# Patient Record
Sex: FEMALE | Race: WHITE | NOT HISPANIC OR LATINO
[De-identification: names, ages, dates, MRNs, and addresses within clinical notes are randomized per-mention and may not be internally consistent; named-entity substitution may affect disease eponyms.]

---

## 2017-01-13 ENCOUNTER — OTHER (OUTPATIENT)
Age: 82
End: 2017-01-13

## 2017-01-23 LAB
ALBUMIN SERPL ELPH-MCNC: 4.1 G/DL
ALP BLD-CCNC: 65 U/L
ALT SERPL-CCNC: 18 U/L
ANION GAP SERPL CALC-SCNC: 16 MMOL/L
APPEARANCE: ABNORMAL
AST SERPL-CCNC: 15 U/L
BACTERIA: ABNORMAL
BASOPHILS # BLD AUTO: 0.04 K/UL
BASOPHILS NFR BLD AUTO: 0.5 %
BILIRUB SERPL-MCNC: 0.4 MG/DL
BILIRUBIN URINE: NEGATIVE
BLOOD URINE: NEGATIVE
BUN SERPL-MCNC: 22 MG/DL
CALCIUM SERPL-MCNC: 9.5 MG/DL
CHLORIDE SERPL-SCNC: 103 MMOL/L
CHOLEST SERPL-MCNC: 154 MG/DL
CHOLEST/HDLC SERPL: 2.5 RATIO
CO2 SERPL-SCNC: 24 MMOL/L
COLOR: YELLOW
CREAT SERPL-MCNC: 0.74 MG/DL
EOSINOPHIL # BLD AUTO: 0.16 K/UL
EOSINOPHIL NFR BLD AUTO: 1.9 %
GLUCOSE QUALITATIVE U: NORMAL MG/DL
GLUCOSE SERPL-MCNC: 95 MG/DL
HCT VFR BLD CALC: 40.4 %
HDLC SERPL-MCNC: 62 MG/DL
HGB BLD-MCNC: 13 G/DL
HYALINE CASTS: 2 /LPF
IMM GRANULOCYTES NFR BLD AUTO: 0.4 %
KETONES URINE: NEGATIVE
LDLC SERPL CALC-MCNC: 68 MG/DL
LEUKOCYTE ESTERASE URINE: ABNORMAL
LYMPHOCYTES # BLD AUTO: 2.68 K/UL
LYMPHOCYTES NFR BLD AUTO: 32.5 %
MAN DIFF?: NORMAL
MCHC RBC-ENTMCNC: 31.2 PG
MCHC RBC-ENTMCNC: 32.2 GM/DL
MCV RBC AUTO: 96.9 FL
MICROSCOPIC-UA: NORMAL
MONOCYTES # BLD AUTO: 0.52 K/UL
MONOCYTES NFR BLD AUTO: 6.3 %
NEUTROPHILS # BLD AUTO: 4.82 K/UL
NEUTROPHILS NFR BLD AUTO: 58.4 %
NITRITE URINE: POSITIVE
PH URINE: 6.5
PLATELET # BLD AUTO: 314 K/UL
POTASSIUM SERPL-SCNC: 4.6 MMOL/L
PROT SERPL-MCNC: 6.2 G/DL
PROTEIN URINE: NEGATIVE MG/DL
RBC # BLD: 4.17 M/UL
RBC # FLD: 14.9 %
RED BLOOD CELLS URINE: 3 /HPF
SODIUM SERPL-SCNC: 143 MMOL/L
SPECIFIC GRAVITY URINE: 1.02
SQUAMOUS EPITHELIAL CELLS: 11 /HPF
TRIGL SERPL-MCNC: 119 MG/DL
TSH SERPL-ACNC: 4.48 UIU/ML
UROBILINOGEN URINE: NORMAL MG/DL
WBC # FLD AUTO: 8.25 K/UL
WHITE BLOOD CELLS URINE: 16 /HPF

## 2017-01-24 ENCOUNTER — APPOINTMENT (OUTPATIENT)
Dept: INTERNAL MEDICINE | Facility: CLINIC | Age: 82
End: 2017-01-24

## 2017-01-24 ENCOUNTER — NON-APPOINTMENT (OUTPATIENT)
Age: 82
End: 2017-01-24

## 2017-01-24 VITALS
WEIGHT: 160 LBS | SYSTOLIC BLOOD PRESSURE: 132 MMHG | RESPIRATION RATE: 16 BRPM | DIASTOLIC BLOOD PRESSURE: 84 MMHG | OXYGEN SATURATION: 98 % | HEART RATE: 79 BPM | BODY MASS INDEX: 29.26 KG/M2

## 2017-01-24 DIAGNOSIS — F41.9 ANXIETY DISORDER, UNSPECIFIED: ICD-10-CM

## 2017-01-24 DIAGNOSIS — Z79.2 LONG TERM (CURRENT) USE OF ANTIBIOTICS: ICD-10-CM

## 2017-01-24 DIAGNOSIS — R55 SYNCOPE AND COLLAPSE: ICD-10-CM

## 2017-01-27 ENCOUNTER — MEDICATION RENEWAL (OUTPATIENT)
Age: 82
End: 2017-01-27

## 2017-01-30 LAB
APPEARANCE: CLEAR
BACTERIA: ABNORMAL
BILIRUBIN URINE: NEGATIVE
BLOOD URINE: ABNORMAL
COLOR: YELLOW
GLUCOSE QUALITATIVE U: NORMAL MG/DL
HYALINE CASTS: 0 /LPF
KETONES URINE: NEGATIVE
LEUKOCYTE ESTERASE URINE: ABNORMAL
MICROSCOPIC-UA: NORMAL
NITRITE URINE: POSITIVE
PH URINE: 6.5
PROTEIN URINE: NEGATIVE MG/DL
RED BLOOD CELLS URINE: 1 /HPF
SPECIFIC GRAVITY URINE: 1.02
SQUAMOUS EPITHELIAL CELLS: 1 /HPF
UROBILINOGEN URINE: NORMAL MG/DL
WHITE BLOOD CELLS URINE: 11 /HPF

## 2017-02-06 ENCOUNTER — OUTPATIENT (OUTPATIENT)
Dept: OUTPATIENT SERVICES | Facility: HOSPITAL | Age: 82
LOS: 1 days | End: 2017-02-06
Payer: MEDICARE

## 2017-02-06 ENCOUNTER — APPOINTMENT (OUTPATIENT)
Dept: RADIOLOGY | Facility: CLINIC | Age: 82
End: 2017-02-06

## 2017-02-06 DIAGNOSIS — C34.90 MALIGNANT NEOPLASM OF UNSPECIFIED PART OF UNSPECIFIED BRONCHUS OR LUNG: ICD-10-CM

## 2017-02-06 DIAGNOSIS — H26.9 UNSPECIFIED CATARACT: Chronic | ICD-10-CM

## 2017-02-06 DIAGNOSIS — Z98.89 OTHER SPECIFIED POSTPROCEDURAL STATES: Chronic | ICD-10-CM

## 2017-02-06 PROCEDURE — 71046 X-RAY EXAM CHEST 2 VIEWS: CPT

## 2017-02-14 ENCOUNTER — APPOINTMENT (OUTPATIENT)
Dept: THORACIC SURGERY | Facility: CLINIC | Age: 82
End: 2017-02-14

## 2017-02-14 VITALS
RESPIRATION RATE: 16 BRPM | DIASTOLIC BLOOD PRESSURE: 80 MMHG | HEART RATE: 73 BPM | OXYGEN SATURATION: 98 % | WEIGHT: 160 LBS | HEIGHT: 62 IN | BODY MASS INDEX: 29.44 KG/M2 | SYSTOLIC BLOOD PRESSURE: 135 MMHG

## 2017-02-14 RX ORDER — AMOXICILLIN 500 MG/1
500 CAPSULE ORAL TWICE DAILY
Qty: 24 | Refills: 0 | Status: COMPLETED | COMMUNITY
Start: 2017-01-24 | End: 2017-02-14

## 2017-02-14 RX ORDER — AMPICILLIN 500 MG/1
500 CAPSULE ORAL
Qty: 30 | Refills: 5 | Status: COMPLETED | COMMUNITY
Start: 2017-01-27 | End: 2017-02-14

## 2017-05-11 ENCOUNTER — OUTPATIENT (OUTPATIENT)
Dept: OUTPATIENT SERVICES | Facility: HOSPITAL | Age: 82
LOS: 1 days | End: 2017-05-11
Payer: MEDICARE

## 2017-05-11 DIAGNOSIS — C34.01 MALIGNANT NEOPLASM OF RIGHT MAIN BRONCHUS: ICD-10-CM

## 2017-05-11 DIAGNOSIS — Z98.89 OTHER SPECIFIED POSTPROCEDURAL STATES: Chronic | ICD-10-CM

## 2017-05-11 DIAGNOSIS — H26.9 UNSPECIFIED CATARACT: Chronic | ICD-10-CM

## 2017-05-11 PROCEDURE — 71250 CT THORAX DX C-: CPT | Mod: 26

## 2017-05-11 PROCEDURE — 71250 CT THORAX DX C-: CPT

## 2017-05-16 ENCOUNTER — APPOINTMENT (OUTPATIENT)
Dept: THORACIC SURGERY | Facility: CLINIC | Age: 82
End: 2017-05-16

## 2017-05-16 VITALS
RESPIRATION RATE: 16 BRPM | SYSTOLIC BLOOD PRESSURE: 134 MMHG | BODY MASS INDEX: 29.08 KG/M2 | DIASTOLIC BLOOD PRESSURE: 78 MMHG | WEIGHT: 158 LBS | HEIGHT: 62 IN | OXYGEN SATURATION: 97 % | HEART RATE: 74 BPM

## 2017-05-18 ENCOUNTER — APPOINTMENT (OUTPATIENT)
Dept: INTERNAL MEDICINE | Facility: CLINIC | Age: 82
End: 2017-05-18

## 2017-05-18 VITALS
RESPIRATION RATE: 14 BRPM | HEART RATE: 79 BPM | SYSTOLIC BLOOD PRESSURE: 182 MMHG | TEMPERATURE: 90.3 F | DIASTOLIC BLOOD PRESSURE: 100 MMHG

## 2017-06-12 ENCOUNTER — RX RENEWAL (OUTPATIENT)
Age: 82
End: 2017-06-12

## 2017-06-21 ENCOUNTER — MEDICATION RENEWAL (OUTPATIENT)
Age: 82
End: 2017-06-21

## 2017-08-08 ENCOUNTER — MEDICATION RENEWAL (OUTPATIENT)
Age: 82
End: 2017-08-08

## 2017-11-03 ENCOUNTER — APPOINTMENT (OUTPATIENT)
Dept: CT IMAGING | Facility: CLINIC | Age: 82
End: 2017-11-03

## 2017-11-03 ENCOUNTER — OUTPATIENT (OUTPATIENT)
Dept: OUTPATIENT SERVICES | Facility: HOSPITAL | Age: 82
LOS: 1 days | End: 2017-11-03
Payer: MEDICARE

## 2017-11-03 DIAGNOSIS — Z98.89 OTHER SPECIFIED POSTPROCEDURAL STATES: Chronic | ICD-10-CM

## 2017-11-03 DIAGNOSIS — Z00.8 ENCOUNTER FOR OTHER GENERAL EXAMINATION: ICD-10-CM

## 2017-11-03 DIAGNOSIS — H26.9 UNSPECIFIED CATARACT: Chronic | ICD-10-CM

## 2017-11-03 PROCEDURE — 71250 CT THORAX DX C-: CPT

## 2017-11-03 PROCEDURE — 71250 CT THORAX DX C-: CPT | Mod: 26

## 2017-11-06 ENCOUNTER — RX RENEWAL (OUTPATIENT)
Age: 82
End: 2017-11-06

## 2017-11-14 ENCOUNTER — APPOINTMENT (OUTPATIENT)
Dept: THORACIC SURGERY | Facility: CLINIC | Age: 82
End: 2017-11-14
Payer: MEDICARE

## 2017-11-14 VITALS — BODY MASS INDEX: 29.44 KG/M2 | WEIGHT: 160 LBS | HEIGHT: 62 IN

## 2017-11-14 PROCEDURE — 99215 OFFICE O/P EST HI 40 MIN: CPT

## 2017-11-16 ENCOUNTER — APPOINTMENT (OUTPATIENT)
Dept: INTERNAL MEDICINE | Facility: CLINIC | Age: 82
End: 2017-11-16
Payer: MEDICARE

## 2017-11-16 ENCOUNTER — OTHER (OUTPATIENT)
Age: 82
End: 2017-11-16

## 2017-11-16 PROCEDURE — 99214 OFFICE O/P EST MOD 30 MIN: CPT

## 2017-11-22 LAB
APPEARANCE: ABNORMAL
BACTERIA: ABNORMAL
BILIRUBIN URINE: NEGATIVE
BLOOD URINE: ABNORMAL
COLOR: YELLOW
GLUCOSE QUALITATIVE U: NEGATIVE MG/DL
HYALINE CASTS: 0 /LPF
KETONES URINE: NEGATIVE
LEUKOCYTE ESTERASE URINE: ABNORMAL
MICROSCOPIC-UA: NORMAL
NITRITE URINE: POSITIVE
PH URINE: 6
PROTEIN URINE: NEGATIVE MG/DL
RED BLOOD CELLS URINE: 1 /HPF
SPECIFIC GRAVITY URINE: 1.02
SQUAMOUS EPITHELIAL CELLS: 1 /HPF
UROBILINOGEN URINE: NEGATIVE MG/DL
WHITE BLOOD CELLS URINE: 50 /HPF

## 2017-12-04 ENCOUNTER — TRANSCRIPTION ENCOUNTER (OUTPATIENT)
Age: 82
End: 2017-12-04

## 2017-12-14 ENCOUNTER — MOBILE ON CALL (OUTPATIENT)
Age: 82
End: 2017-12-14

## 2017-12-14 ENCOUNTER — LABORATORY RESULT (OUTPATIENT)
Age: 82
End: 2017-12-14

## 2017-12-21 DIAGNOSIS — N39.0 URINARY TRACT INFECTION, SITE NOT SPECIFIED: ICD-10-CM

## 2017-12-21 LAB
APPEARANCE: ABNORMAL
BILIRUBIN URINE: NEGATIVE
BLOOD URINE: NEGATIVE
COLOR: YELLOW
GLUCOSE QUALITATIVE U: NEGATIVE MG/DL
KETONES URINE: NEGATIVE
LEUKOCYTE ESTERASE URINE: ABNORMAL
NITRITE URINE: NEGATIVE
PH URINE: 7.5
PROTEIN URINE: NEGATIVE MG/DL
SPECIFIC GRAVITY URINE: 1.02
UROBILINOGEN URINE: NEGATIVE MG/DL

## 2017-12-29 ENCOUNTER — MEDICATION RENEWAL (OUTPATIENT)
Age: 82
End: 2017-12-29

## 2018-01-12 ENCOUNTER — TRANSCRIPTION ENCOUNTER (OUTPATIENT)
Age: 83
End: 2018-01-12

## 2018-01-30 ENCOUNTER — TRANSCRIPTION ENCOUNTER (OUTPATIENT)
Age: 83
End: 2018-01-30

## 2018-03-09 ENCOUNTER — APPOINTMENT (OUTPATIENT)
Dept: UROGYNECOLOGY | Facility: CLINIC | Age: 83
End: 2018-03-09
Payer: MEDICARE

## 2018-03-09 VITALS
HEART RATE: 78 BPM | HEIGHT: 62 IN | DIASTOLIC BLOOD PRESSURE: 90 MMHG | WEIGHT: 158 LBS | OXYGEN SATURATION: 98 % | BODY MASS INDEX: 29.08 KG/M2 | SYSTOLIC BLOOD PRESSURE: 158 MMHG

## 2018-03-09 DIAGNOSIS — R39.198 OTHER DIFFICULTIES WITH MICTURITION: ICD-10-CM

## 2018-03-09 DIAGNOSIS — N30.00 ACUTE CYSTITIS W/OUT HEMATURIA: ICD-10-CM

## 2018-03-09 PROCEDURE — 99204 OFFICE O/P NEW MOD 45 MIN: CPT | Mod: 25

## 2018-03-09 PROCEDURE — 51701 INSERT BLADDER CATHETER: CPT

## 2018-03-12 ENCOUNTER — RESULT REVIEW (OUTPATIENT)
Age: 83
End: 2018-03-12

## 2018-03-12 LAB
APPEARANCE: ABNORMAL
BACTERIA UR CULT: ABNORMAL
BACTERIA: ABNORMAL
BILIRUB UR QL STRIP: NORMAL
BILIRUBIN URINE: NEGATIVE
BLOOD URINE: ABNORMAL
CLARITY UR: NORMAL
COLLECTION METHOD: NORMAL
COLOR: ABNORMAL
GLUCOSE QUALITATIVE U: NEGATIVE MG/DL
GLUCOSE UR-MCNC: NORMAL
HCG UR QL: 0.2 EU/DL
HGB UR QL STRIP.AUTO: NORMAL
HYALINE CASTS: 2 /LPF
KETONES UR-MCNC: NORMAL
KETONES URINE: NEGATIVE
LEUKOCYTE ESTERASE UR QL STRIP: NORMAL
LEUKOCYTE ESTERASE URINE: ABNORMAL
MICROSCOPIC-UA: NORMAL
NITRITE UR QL STRIP: POSITIVE
NITRITE URINE: POSITIVE
PH UR STRIP: 6.5
PH URINE: 7
PROT UR STRIP-MCNC: 100
PROTEIN URINE: 30 MG/DL
RED BLOOD CELLS URINE: 59 /HPF
SP GR UR STRIP: 1.02
SPECIFIC GRAVITY URINE: 1.03
SQUAMOUS EPITHELIAL CELLS: 0 /HPF
UROBILINOGEN URINE: NEGATIVE MG/DL
WHITE BLOOD CELLS URINE: 187 /HPF

## 2018-03-12 RX ORDER — TIOTROPIUM BROMIDE INHALATION SPRAY 3.12 UG/1
2.5 SPRAY, METERED RESPIRATORY (INHALATION)
Qty: 12 | Refills: 0 | Status: DISCONTINUED | COMMUNITY
Start: 2018-01-31

## 2018-03-12 RX ORDER — DOXYCYCLINE 100 MG/1
100 TABLET, FILM COATED ORAL
Qty: 14 | Refills: 0 | Status: DISCONTINUED | COMMUNITY
Start: 2018-01-12

## 2018-03-18 ENCOUNTER — EMERGENCY (EMERGENCY)
Facility: HOSPITAL | Age: 83
LOS: 1 days | Discharge: HOME | End: 2018-03-18
Attending: EMERGENCY MEDICINE | Admitting: EMERGENCY MEDICINE
Payer: MEDICARE

## 2018-03-18 VITALS
OXYGEN SATURATION: 96 % | SYSTOLIC BLOOD PRESSURE: 130 MMHG | TEMPERATURE: 98 F | DIASTOLIC BLOOD PRESSURE: 84 MMHG | RESPIRATION RATE: 14 BRPM | HEART RATE: 68 BPM

## 2018-03-18 VITALS
TEMPERATURE: 98 F | HEART RATE: 95 BPM | WEIGHT: 158.07 LBS | SYSTOLIC BLOOD PRESSURE: 150 MMHG | HEIGHT: 62 IN | OXYGEN SATURATION: 94 % | RESPIRATION RATE: 14 BRPM | DIASTOLIC BLOOD PRESSURE: 88 MMHG

## 2018-03-18 DIAGNOSIS — Z98.89 OTHER SPECIFIED POSTPROCEDURAL STATES: Chronic | ICD-10-CM

## 2018-03-18 DIAGNOSIS — H26.9 UNSPECIFIED CATARACT: Chronic | ICD-10-CM

## 2018-03-18 LAB
ANION GAP SERPL CALC-SCNC: 7 MMOL/L — SIGNIFICANT CHANGE UP (ref 5–17)
APTT BLD: 31.8 SEC — SIGNIFICANT CHANGE UP (ref 27.5–37.4)
BUN SERPL-MCNC: 16 MG/DL — SIGNIFICANT CHANGE UP (ref 7–23)
CALCIUM SERPL-MCNC: 8.8 MG/DL — SIGNIFICANT CHANGE UP (ref 8.5–10.1)
CHLORIDE SERPL-SCNC: 107 MMOL/L — SIGNIFICANT CHANGE UP (ref 96–108)
CK MB BLD-MCNC: 2.2 % — SIGNIFICANT CHANGE UP (ref 0–3.5)
CK MB CFR SERPL CALC: 1.1 NG/ML — SIGNIFICANT CHANGE UP (ref 0–3.6)
CK SERPL-CCNC: 51 U/L — SIGNIFICANT CHANGE UP (ref 26–192)
CO2 SERPL-SCNC: 26 MMOL/L — SIGNIFICANT CHANGE UP (ref 22–31)
CREAT SERPL-MCNC: 0.56 MG/DL — SIGNIFICANT CHANGE UP (ref 0.5–1.3)
GLUCOSE SERPL-MCNC: 103 MG/DL — HIGH (ref 70–99)
HCT VFR BLD CALC: 43.4 % — SIGNIFICANT CHANGE UP (ref 34.5–45)
HGB BLD-MCNC: 14.4 G/DL — SIGNIFICANT CHANGE UP (ref 11.5–15.5)
INR BLD: 1.04 RATIO — SIGNIFICANT CHANGE UP (ref 0.88–1.16)
MCHC RBC-ENTMCNC: 31 PG — SIGNIFICANT CHANGE UP (ref 27–34)
MCHC RBC-ENTMCNC: 33.1 GM/DL — SIGNIFICANT CHANGE UP (ref 32–36)
MCV RBC AUTO: 93.6 FL — SIGNIFICANT CHANGE UP (ref 80–100)
PLATELET # BLD AUTO: 363 K/UL — SIGNIFICANT CHANGE UP (ref 150–400)
POTASSIUM SERPL-MCNC: 4 MMOL/L — SIGNIFICANT CHANGE UP (ref 3.5–5.3)
POTASSIUM SERPL-SCNC: 4 MMOL/L — SIGNIFICANT CHANGE UP (ref 3.5–5.3)
PROTHROM AB SERPL-ACNC: 11.4 SEC — SIGNIFICANT CHANGE UP (ref 9.8–12.7)
RBC # BLD: 4.64 M/UL — SIGNIFICANT CHANGE UP (ref 3.8–5.2)
RBC # FLD: 12.5 % — SIGNIFICANT CHANGE UP (ref 10.3–14.5)
SODIUM SERPL-SCNC: 140 MMOL/L — SIGNIFICANT CHANGE UP (ref 135–145)
TROPONIN I SERPL-MCNC: <.015 NG/ML — SIGNIFICANT CHANGE UP (ref 0.01–0.04)
WBC # BLD: 8.2 K/UL — SIGNIFICANT CHANGE UP (ref 3.8–10.5)
WBC # FLD AUTO: 8.2 K/UL — SIGNIFICANT CHANGE UP (ref 3.8–10.5)

## 2018-03-18 PROCEDURE — 85610 PROTHROMBIN TIME: CPT

## 2018-03-18 PROCEDURE — 84484 ASSAY OF TROPONIN QUANT: CPT

## 2018-03-18 PROCEDURE — 82550 ASSAY OF CK (CPK): CPT

## 2018-03-18 PROCEDURE — 85027 COMPLETE CBC AUTOMATED: CPT

## 2018-03-18 PROCEDURE — 99284 EMERGENCY DEPT VISIT MOD MDM: CPT | Mod: 25

## 2018-03-18 PROCEDURE — 99285 EMERGENCY DEPT VISIT HI MDM: CPT

## 2018-03-18 PROCEDURE — 71046 X-RAY EXAM CHEST 2 VIEWS: CPT | Mod: 26

## 2018-03-18 PROCEDURE — 80048 BASIC METABOLIC PNL TOTAL CA: CPT

## 2018-03-18 PROCEDURE — 93005 ELECTROCARDIOGRAM TRACING: CPT

## 2018-03-18 PROCEDURE — 85730 THROMBOPLASTIN TIME PARTIAL: CPT

## 2018-03-18 PROCEDURE — 82553 CREATINE MB FRACTION: CPT

## 2018-03-18 PROCEDURE — 71046 X-RAY EXAM CHEST 2 VIEWS: CPT

## 2018-03-18 PROCEDURE — 93010 ELECTROCARDIOGRAM REPORT: CPT

## 2018-03-18 NOTE — ED ADULT NURSE NOTE - OBJECTIVE STATEMENT
since thursday indigestion burning in esophageal area also pain between shoulder blades shortness of breath, diarrhea thursday night Pt A&Ox4, ambulatory to ED c/o "indigestion" since this past Thursday.  Pt states she had "burning" in esophageal area as well as pain between shoulder blades which is different from her normal symptoms of Escobar's esophagus.  Pt also had some shortness of breath, as well as diarrhea on Thursday night.  Pt also states that her chest had "pain" along with the burning.  Pt states these symptoms are resolved at this time.

## 2018-03-18 NOTE — ED PROVIDER NOTE - OBJECTIVE STATEMENT
83 yo white female with COPD/Lung Ca and Escobar's Esophagus who has had 3-4days of 8-12 hours each of chest pressure radiating to upper interscapular area without associated symptoms. No N/V/F/C and no change in cough. Feels like her prior attacks of Escobar's except that in the past there was no back pain. This morning awoke with same pain but went away within the past 2-hours.

## 2018-03-18 NOTE — ED ADULT NURSE NOTE - PSH
Cataract  2009 B/l  Deviated nasal septum  had surgery in 1994  H/O arthroplasty  right knee replacement 2013  H/O arthroscopy of left knee  2013  History of lung surgery  Left lng wedge resection   7/6/15  S/P cataract surgery  bilateral in 2009  S/P D&C (status post dilation and curettage)  Endometrial bx 2012  S/P mastectomy, bilateral  in 1998  S/P wrist surgery  right in 2012

## 2018-03-18 NOTE — ED PROVIDER NOTE - CONSTITUTIONAL, MLM
normal... Well appearing, obese white female, well nourished, awake, alert, oriented to person, place, time/situation and in no apparent distress.

## 2018-03-18 NOTE — CONSULT NOTE ADULT - ASSESSMENT
The patient is an 82 year old female with a history of Escobar's esophagus, COPD, lung cancer s/p lobectomy, HL, HTN, vasovagal syncope who presents with atypical chest pain.    Plan:  - Chest pain possibly GI in nature given description of symptoms, although cardiac certainly a possibility given risk factors  - ECG with no evidence of ischemia or infarction  - Given duration of symptoms (>6 hours), acute MI ruled out with one set of cardiac enzymes  - CXR read pending; atherosclerotic aorta, grossly clear lungs  - Patient can be discharged from a cardiac perspective and follow-up for visit and stress testing

## 2018-03-18 NOTE — CONSULT NOTE ADULT - SUBJECTIVE AND OBJECTIVE BOX
History of Present Illness: The patient is an 82 year old female with a history of Escobar's esophagus, COPD, lung cancer s/p lobectomy, HL, HTN, vasovagal syncope who presents with chest pain. She notes every so often pressure that is in the center of the chest with some traveling up the throat which she always attributed to GERD. On Thursday, she had a similar episode which persisted much longer than usual, up to 8 hours. Today, she had a similar episode that persisted despite normal treatment for GERD. It was not exertional, but she felt lightheaded and warm with exertion. No worsening shortness of breath but she has been short of breath due to COPD and recent RSV infection. No recent cardiac testing. She does not see a cardiologist.    Past Medical/Surgical History:  Escobar's esophagus, COPD, lung cancer s/p lobectomy, HL, HTN, vasovagal syncope    Medications:  Home Medications:  aspirin 81 mg oral tablet: 1 tab(s) orally once a day (18 Mar 2018 13:49)  atenolol 25 mg oral tablet: 1 tab(s) orally once a day (18 Mar 2018 13:49)  atorvastatin 10 mg oral tablet: 1 tab(s) orally once a day (at bedtime) (18 Mar 2018 13:49)  Caltrate 600 + D 600 mg-800 intl units oral tablet: 1 tab(s) orally 2 times a day (18 Mar 2018 13:49)  Centrum Silver Ultra Women&#x27;s:   once a day last dose 6/26/15 (18 Mar 2018 13:49)  Co Q-10 100 mg oral capsule: 1 cap(s) orally once a day (18 Mar 2018 13:49)  Nasra-Sequels 65 mg-25 mg oral tablet, extended release: 1 tab(s) orally once a day (18 Mar 2018 13:49)  fluticasone propionate: 2 spray(s) nasal once a day (18 Mar 2018 13:49)  pantoprazole 40 mg oral delayed release tablet:  orally 2 times a day (18 Mar 2018 13:49)  ProAir HFA CFC free 90 mcg/inh inhalation aerosol: 2 puff(s) inhaled 4 times a day, As Needed (18 Mar 2018 13:49)  Qvar 40 mcg/inh inhalation aerosol: 1 puff(s) inhaled 2 times a day (18 Mar 2018 13:49)  Spiriva 18 mcg inhalation capsule: 1 cap(s) inhaled once a day (18 Mar 2018 13:49)  Vitamin B-100 oral tablet: 1 tab(s) orally once a day (18 Mar 2018 13:49)  Vitamin B12 250 mcg oral tablet: 1 tab(s) orally once a day (18 Mar 2018 13:49)  Vitamin C 500 mg oral tablet: 1 tab(s) orally once a day (18 Mar 2018 13:49)  Vitamin D3 2000 intl units oral capsule: 1 cap(s) orally once a day (18 Mar 2018 13:49)      Family History: Non-contributory family history of premature cardiovascular atherosclerotic disease    Social History: Unknown tobacco, alcohol or drug use    Review of Systems:  General: No fevers, chills, weight loss or gain  Skin: No rashes, color changes  Cardiovascular: (+) chest pain, no orthopnea  Respiratory: (+) shortness of breath, cough  Gastrointestinal: No nausea, abdominal pain  Genitourinary: No incontinence, pain with urination  Musculoskeletal: No pain, swelling, decreased range of motion  Neurological: No headache, weakness  Psychiatric: No depression, anxiety  Endocrine: No weight loss or gain, increased thirst  All other systems are comprehensively negative.    Physical Exam:  Vitals:        Vital Signs Last 24 Hrs  T(C): 36.7 (18 Mar 2018 13:31), Max: 36.7 (18 Mar 2018 13:31)  T(F): 98 (18 Mar 2018 13:31), Max: 98 (18 Mar 2018 13:31)  HR: 95 (18 Mar 2018 13:31) (95 - 95)  BP: 150/88 (18 Mar 2018 13:31) (150/88 - 150/88)  BP(mean): --  RR: 14 (18 Mar 2018 13:31) (14 - 14)  SpO2: 94% (18 Mar 2018 13:31) (94% - 94%)  General: NAD  HEENT: MMM  Neck: No JVD, no carotid bruit  Lungs: CTAB  CV: RRR, nl S1/S2, no M/R/G  Abdomen: S/NT/ND, +BS  Extremities: No LE edema, no cyanosis  Neuro: AAOx3, non-focal  Skin: No rash    Labs:                        14.4   8.2   )-----------( 363      ( 18 Mar 2018 14:21 )             43.4     03-18    140  |  107  |  16  ----------------------------<  103<H>  4.0   |  26  |  0.56    Ca    8.8      18 Mar 2018 14:21      CARDIAC MARKERS ( 18 Mar 2018 14:21 )  <.015 ng/mL / x     / 51 U/L / x     / 1.1 ng/mL      PT/INR - ( 18 Mar 2018 14:28 )   PT: 11.4 sec;   INR: 1.04 ratio         PTT - ( 18 Mar 2018 14:28 )  PTT:31.8 sec    ECG: NSR, normal axis, nonspecific ST abnormality, poor baseline

## 2018-03-18 NOTE — ED ADULT NURSE REASSESSMENT NOTE - NS ED NURSE REASSESS COMMENT FT1
Pt has lymphedema right arm secondary to breast cancer.  Pt has extremely poor vasculature in left arm due to "overuse."  Made one unsuccessful attempt to obtain IV and draw blood from left arm.  Pt then asked if blood only (no IV placement) could be drawn with butterfly from right hand.  I complied, one successful attempt with butterfly to right top of hand, blood drawn and sent to lab.  Pt declines attempt at IV access to left arm at this time.

## 2018-03-18 NOTE — ED ADULT NURSE NOTE - PMH
Abnormal lung field    Asthma    Barretts esophagus    Breast cancer  H/o 10/1998  Cataract  Bilateral  COPD (chronic obstructive pulmonary disease)    Hypertension    Lung cancer    Lymph edema  right - NO IV NO BLOOD DRAW, NO B/P RIGHT ARM  Thyroid nodule    Vasovagal syndrome

## 2018-04-17 ENCOUNTER — APPOINTMENT (OUTPATIENT)
Dept: UROGYNECOLOGY | Facility: CLINIC | Age: 83
End: 2018-04-17
Payer: MEDICARE

## 2018-04-17 PROCEDURE — A4562: CPT

## 2018-04-17 PROCEDURE — 57160 INSERT PESSARY/OTHER DEVICE: CPT

## 2018-04-21 ENCOUNTER — MOBILE ON CALL (OUTPATIENT)
Age: 83
End: 2018-04-21

## 2018-04-22 ENCOUNTER — EMERGENCY (EMERGENCY)
Facility: HOSPITAL | Age: 83
LOS: 1 days | Discharge: HOME | End: 2018-04-22
Attending: STUDENT IN AN ORGANIZED HEALTH CARE EDUCATION/TRAINING PROGRAM | Admitting: STUDENT IN AN ORGANIZED HEALTH CARE EDUCATION/TRAINING PROGRAM
Payer: MEDICARE

## 2018-04-22 VITALS
TEMPERATURE: 98 F | OXYGEN SATURATION: 97 % | DIASTOLIC BLOOD PRESSURE: 90 MMHG | RESPIRATION RATE: 14 BRPM | HEART RATE: 110 BPM | SYSTOLIC BLOOD PRESSURE: 161 MMHG | WEIGHT: 156.97 LBS | HEIGHT: 62 IN

## 2018-04-22 VITALS
TEMPERATURE: 98 F | RESPIRATION RATE: 15 BRPM | DIASTOLIC BLOOD PRESSURE: 86 MMHG | SYSTOLIC BLOOD PRESSURE: 150 MMHG | OXYGEN SATURATION: 97 % | HEART RATE: 90 BPM

## 2018-04-22 DIAGNOSIS — Z98.89 OTHER SPECIFIED POSTPROCEDURAL STATES: Chronic | ICD-10-CM

## 2018-04-22 DIAGNOSIS — H26.9 UNSPECIFIED CATARACT: Chronic | ICD-10-CM

## 2018-04-22 LAB
BASOPHILS # BLD AUTO: 0.1 K/UL — SIGNIFICANT CHANGE UP (ref 0–0.2)
BASOPHILS NFR BLD AUTO: 0.9 % — SIGNIFICANT CHANGE UP (ref 0–2)
EOSINOPHIL # BLD AUTO: 0.2 K/UL — SIGNIFICANT CHANGE UP (ref 0–0.5)
EOSINOPHIL NFR BLD AUTO: 1.5 % — SIGNIFICANT CHANGE UP (ref 0–6)
HCT VFR BLD CALC: 39.6 % — SIGNIFICANT CHANGE UP (ref 34.5–45)
HGB BLD-MCNC: 13.5 G/DL — SIGNIFICANT CHANGE UP (ref 11.5–15.5)
LYMPHOCYTES # BLD AUTO: 2.9 K/UL — SIGNIFICANT CHANGE UP (ref 1–3.3)
LYMPHOCYTES # BLD AUTO: 24.9 % — SIGNIFICANT CHANGE UP (ref 13–44)
MCHC RBC-ENTMCNC: 31.7 PG — SIGNIFICANT CHANGE UP (ref 27–34)
MCHC RBC-ENTMCNC: 34.1 GM/DL — SIGNIFICANT CHANGE UP (ref 32–36)
MCV RBC AUTO: 93.1 FL — SIGNIFICANT CHANGE UP (ref 80–100)
MONOCYTES # BLD AUTO: 0.8 K/UL — SIGNIFICANT CHANGE UP (ref 0–0.9)
MONOCYTES NFR BLD AUTO: 6.8 % — SIGNIFICANT CHANGE UP (ref 1–9)
NEUTROPHILS # BLD AUTO: 7.5 K/UL — HIGH (ref 1.8–7.4)
NEUTROPHILS NFR BLD AUTO: 65.9 % — SIGNIFICANT CHANGE UP (ref 43–77)
PLATELET # BLD AUTO: 406 K/UL — HIGH (ref 150–400)
RBC # BLD: 4.25 M/UL — SIGNIFICANT CHANGE UP (ref 3.8–5.2)
RBC # FLD: 13.5 % — SIGNIFICANT CHANGE UP (ref 10.3–14.5)
WBC # BLD: 11.5 K/UL — HIGH (ref 3.8–10.5)
WBC # FLD AUTO: 11.5 K/UL — HIGH (ref 3.8–10.5)

## 2018-04-22 PROCEDURE — 36415 COLL VENOUS BLD VENIPUNCTURE: CPT

## 2018-04-22 PROCEDURE — 99283 EMERGENCY DEPT VISIT LOW MDM: CPT

## 2018-04-22 PROCEDURE — 85027 COMPLETE CBC AUTOMATED: CPT

## 2018-04-22 PROCEDURE — 99284 EMERGENCY DEPT VISIT MOD MDM: CPT

## 2018-04-22 RX ORDER — BECLOMETHASONE DIPROPIONATE 40 UG/1
1 AEROSOL, METERED RESPIRATORY (INHALATION)
Qty: 0 | Refills: 0 | COMMUNITY

## 2018-04-22 NOTE — ED ADULT TRIAGE NOTE - CHIEF COMPLAINT QUOTE
"I have a prolapsed bladder and I had a pessary inserted last Tuesday and today I started having vaginal bleeding."

## 2018-04-22 NOTE — ED PROVIDER NOTE - NOTES
Discussed case with Dr. Coronel covering for Dr. Lujan.  Bleeding normal.  Patient can follow up in the office tomorrow morning.

## 2018-04-22 NOTE — ED PROVIDER NOTE - GENITOURINARY, MLM
No discharge, lesions.  On bi-manual exam, pessary palpable.  No vaginal bleeding, discharge or clots.

## 2018-04-22 NOTE — ED PROVIDER NOTE - PROGRESS NOTE DETAILS
patient just went to the restroom, no bleeding at this time. Results of CBC given to patient.  Reassured and patient feels well.  Will f/u with Dr. Lujan in the AM. Family at bedside to take patient home

## 2018-04-22 NOTE — ED ADULT NURSE NOTE - OBJECTIVE STATEMENT
Received pt awake alert and oriented x 3, COURTNEY. Airway patent. skin intact and dry. Pt presents C/O vaginal bleed started today. Pt states "I have a prolapsed bladder and I had a pessary inserted last Tuesday and today I started having vaginal bleeding." Vss, afebrile. lab sent. will continue to monitor

## 2018-04-22 NOTE — ED PROVIDER NOTE - OBJECTIVE STATEMENT
82 year old female with extensive PMH presents with vaginal bleeding.  Patient had a prolapsed bladder and pessary inserted 5 days ago with Dr. Sarah Berman (Uro-Gyn).  She was told that scant spotting was normal and to go to the ER for heavy vaginal bleeding.  She states she spotted this afternoon and in the evening, she experienced heavier bright red bleeding. She did not saturate a pad.  Denies abdominal pain, cramping, dizziness, weakness.  The bleeding has subsided substantially.  PMD Jose Alberto Peck, Uro-Gyn Dr Sarah Lujan

## 2018-04-23 ENCOUNTER — APPOINTMENT (OUTPATIENT)
Dept: UROGYNECOLOGY | Facility: CLINIC | Age: 83
End: 2018-04-23
Payer: MEDICARE

## 2018-04-23 ENCOUNTER — MESSAGE (OUTPATIENT)
Age: 83
End: 2018-04-23

## 2018-04-23 DIAGNOSIS — N93.9 ABNORMAL UTERINE AND VAGINAL BLEEDING, UNSPECIFIED: ICD-10-CM

## 2018-04-23 PROCEDURE — 99213 OFFICE O/P EST LOW 20 MIN: CPT

## 2018-05-02 ENCOUNTER — APPOINTMENT (OUTPATIENT)
Dept: UROGYNECOLOGY | Facility: CLINIC | Age: 83
End: 2018-05-02

## 2018-05-07 ENCOUNTER — APPOINTMENT (OUTPATIENT)
Dept: UROGYNECOLOGY | Facility: CLINIC | Age: 83
End: 2018-05-07
Payer: MEDICARE

## 2018-05-07 DIAGNOSIS — B37.2 CANDIDIASIS OF SKIN AND NAIL: ICD-10-CM

## 2018-05-07 PROCEDURE — 99213 OFFICE O/P EST LOW 20 MIN: CPT

## 2018-05-09 ENCOUNTER — MESSAGE (OUTPATIENT)
Age: 83
End: 2018-05-09

## 2018-05-10 ENCOUNTER — FORM ENCOUNTER (OUTPATIENT)
Age: 83
End: 2018-05-10

## 2018-05-11 ENCOUNTER — MESSAGE (OUTPATIENT)
Age: 83
End: 2018-05-11

## 2018-05-11 ENCOUNTER — OUTPATIENT (OUTPATIENT)
Dept: OUTPATIENT SERVICES | Facility: HOSPITAL | Age: 83
LOS: 1 days | End: 2018-05-11
Payer: MEDICARE

## 2018-05-11 ENCOUNTER — APPOINTMENT (OUTPATIENT)
Dept: CT IMAGING | Facility: CLINIC | Age: 83
End: 2018-05-11
Payer: MEDICARE

## 2018-05-11 DIAGNOSIS — H26.9 UNSPECIFIED CATARACT: Chronic | ICD-10-CM

## 2018-05-11 DIAGNOSIS — Z98.89 OTHER SPECIFIED POSTPROCEDURAL STATES: Chronic | ICD-10-CM

## 2018-05-11 DIAGNOSIS — Z00.8 ENCOUNTER FOR OTHER GENERAL EXAMINATION: ICD-10-CM

## 2018-05-11 PROCEDURE — 71250 CT THORAX DX C-: CPT | Mod: 26

## 2018-05-11 PROCEDURE — 71250 CT THORAX DX C-: CPT

## 2018-05-15 ENCOUNTER — APPOINTMENT (OUTPATIENT)
Dept: THORACIC SURGERY | Facility: CLINIC | Age: 83
End: 2018-05-15
Payer: MEDICARE

## 2018-05-15 VITALS
WEIGHT: 158 LBS | BODY MASS INDEX: 29.08 KG/M2 | SYSTOLIC BLOOD PRESSURE: 140 MMHG | DIASTOLIC BLOOD PRESSURE: 85 MMHG | HEART RATE: 93 BPM | OXYGEN SATURATION: 97 % | RESPIRATION RATE: 16 BRPM | HEIGHT: 62 IN

## 2018-05-15 PROCEDURE — 99214 OFFICE O/P EST MOD 30 MIN: CPT

## 2018-06-04 ENCOUNTER — APPOINTMENT (OUTPATIENT)
Dept: UROGYNECOLOGY | Facility: CLINIC | Age: 83
End: 2018-06-04
Payer: MEDICARE

## 2018-06-04 PROCEDURE — 99214 OFFICE O/P EST MOD 30 MIN: CPT

## 2018-06-12 ENCOUNTER — MEDICATION RENEWAL (OUTPATIENT)
Age: 83
End: 2018-06-12

## 2018-06-12 RX ORDER — NYSTATIN 100000 1/G
100000 POWDER TOPICAL 3 TIMES DAILY
Qty: 1 | Refills: 1 | Status: DISCONTINUED | COMMUNITY
Start: 2018-05-07 | End: 2018-06-12

## 2018-07-09 ENCOUNTER — RECORD ABSTRACTING (OUTPATIENT)
Age: 83
End: 2018-07-09

## 2018-07-18 ENCOUNTER — APPOINTMENT (OUTPATIENT)
Dept: PULMONOLOGY | Facility: CLINIC | Age: 83
End: 2018-07-18
Payer: MEDICARE

## 2018-07-18 VITALS
HEIGHT: 62 IN | SYSTOLIC BLOOD PRESSURE: 143 MMHG | OXYGEN SATURATION: 97 % | BODY MASS INDEX: 29.08 KG/M2 | HEART RATE: 79 BPM | DIASTOLIC BLOOD PRESSURE: 73 MMHG | WEIGHT: 158 LBS

## 2018-07-18 PROBLEM — C34.90 MALIGNANT NEOPLASM OF UNSPECIFIED PART OF UNSPECIFIED BRONCHUS OR LUNG: Chronic | Status: ACTIVE | Noted: 2018-03-18

## 2018-07-18 PROCEDURE — 99213 OFFICE O/P EST LOW 20 MIN: CPT | Mod: 25

## 2018-07-18 PROCEDURE — 95012 NITRIC OXIDE EXP GAS DETER: CPT

## 2018-07-18 RX ORDER — AZITHROMYCIN 250 MG/1
250 TABLET, FILM COATED ORAL
Qty: 6 | Refills: 2 | Status: COMPLETED | COMMUNITY
Start: 2017-05-18 | End: 2018-07-18

## 2018-07-18 RX ORDER — VERAPAMIL HYDROCHLORIDE 240 MG/1
240 TABLET ORAL
Qty: 30 | Refills: 0 | Status: DISCONTINUED | COMMUNITY
Start: 2018-03-20 | End: 2018-07-18

## 2018-07-18 RX ORDER — CIPROFLOXACIN HYDROCHLORIDE 500 MG/1
500 TABLET, FILM COATED ORAL
Qty: 14 | Refills: 0 | Status: DISCONTINUED | COMMUNITY
Start: 2017-12-21 | End: 2018-07-18

## 2018-07-18 RX ORDER — DOXYCYCLINE 100 MG/1
100 CAPSULE ORAL DAILY
Qty: 10 | Refills: 0 | Status: COMPLETED | COMMUNITY
Start: 2017-11-16 | End: 2018-07-18

## 2018-07-18 RX ORDER — SULFAMETHOXAZOLE AND TRIMETHOPRIM 800; 160 MG/1; MG/1
800-160 TABLET ORAL TWICE DAILY
Qty: 6 | Refills: 0 | Status: DISCONTINUED | COMMUNITY
Start: 2018-03-09 | End: 2018-07-18

## 2018-07-18 RX ORDER — NYSTATIN 100000 [USP'U]/G
100000 CREAM TOPICAL 3 TIMES DAILY
Qty: 1 | Refills: 2 | Status: DISCONTINUED | COMMUNITY
Start: 2018-06-12 | End: 2018-07-18

## 2018-07-18 RX ORDER — PANTOPRAZOLE 40 MG/1
40 TABLET, DELAYED RELEASE ORAL
Refills: 0 | Status: DISCONTINUED | COMMUNITY
End: 2018-07-18

## 2018-07-18 RX ORDER — FLUTICASONE PROPIONATE 50 UG/1
50 SPRAY, METERED NASAL
Refills: 0 | Status: DISCONTINUED | COMMUNITY
End: 2018-07-18

## 2018-07-19 ENCOUNTER — APPOINTMENT (OUTPATIENT)
Dept: PULMONOLOGY | Facility: CLINIC | Age: 83
End: 2018-07-19

## 2018-08-01 ENCOUNTER — RX RENEWAL (OUTPATIENT)
Age: 83
End: 2018-08-01

## 2018-08-02 ENCOUNTER — RX RENEWAL (OUTPATIENT)
Age: 83
End: 2018-08-02

## 2018-08-03 ENCOUNTER — RX RENEWAL (OUTPATIENT)
Age: 83
End: 2018-08-03

## 2018-08-07 ENCOUNTER — RX RENEWAL (OUTPATIENT)
Age: 83
End: 2018-08-07

## 2018-08-08 ENCOUNTER — RX RENEWAL (OUTPATIENT)
Age: 83
End: 2018-08-08

## 2018-09-24 ENCOUNTER — APPOINTMENT (OUTPATIENT)
Dept: UROGYNECOLOGY | Facility: CLINIC | Age: 83
End: 2018-09-24
Payer: MEDICARE

## 2018-09-24 DIAGNOSIS — N36.2 URETHRAL CARUNCLE: ICD-10-CM

## 2018-09-24 PROCEDURE — 99214 OFFICE O/P EST MOD 30 MIN: CPT

## 2018-09-28 ENCOUNTER — RESULT REVIEW (OUTPATIENT)
Age: 83
End: 2018-09-28

## 2018-09-28 LAB — BACTERIA UR CULT: ABNORMAL

## 2018-10-01 ENCOUNTER — RESULT REVIEW (OUTPATIENT)
Age: 83
End: 2018-10-01

## 2018-10-08 ENCOUNTER — APPOINTMENT (OUTPATIENT)
Dept: UROGYNECOLOGY | Facility: CLINIC | Age: 83
End: 2018-10-08
Payer: MEDICARE

## 2018-10-08 LAB
BILIRUB UR QL STRIP: NORMAL
CLARITY UR: NORMAL
COLLECTION METHOD: NORMAL
GLUCOSE UR-MCNC: NORMAL
HCG UR QL: 0.2 EU/DL
HGB UR QL STRIP.AUTO: NORMAL
KETONES UR-MCNC: NORMAL
LEUKOCYTE ESTERASE UR QL STRIP: NORMAL
NITRITE UR QL STRIP: NORMAL
PH UR STRIP: 8.5
PROT UR STRIP-MCNC: NORMAL
SP GR UR STRIP: 1.01

## 2018-10-08 PROCEDURE — 99213 OFFICE O/P EST LOW 20 MIN: CPT

## 2018-10-10 ENCOUNTER — APPOINTMENT (OUTPATIENT)
Dept: PULMONOLOGY | Facility: CLINIC | Age: 83
End: 2018-10-10
Payer: MEDICARE

## 2018-10-10 VITALS — DIASTOLIC BLOOD PRESSURE: 86 MMHG | SYSTOLIC BLOOD PRESSURE: 154 MMHG | HEART RATE: 88 BPM | OXYGEN SATURATION: 96 %

## 2018-10-10 PROCEDURE — 90662 IIV NO PRSV INCREASED AG IM: CPT

## 2018-10-10 PROCEDURE — 99213 OFFICE O/P EST LOW 20 MIN: CPT | Mod: 25

## 2018-10-10 PROCEDURE — G0008: CPT

## 2018-10-10 PROCEDURE — 94060 EVALUATION OF WHEEZING: CPT

## 2018-10-10 PROCEDURE — 95012 NITRIC OXIDE EXP GAS DETER: CPT

## 2018-11-05 ENCOUNTER — APPOINTMENT (OUTPATIENT)
Dept: UROGYNECOLOGY | Facility: CLINIC | Age: 83
End: 2018-11-05

## 2018-11-16 ENCOUNTER — APPOINTMENT (OUTPATIENT)
Dept: RADIOLOGY | Facility: CLINIC | Age: 83
End: 2018-11-16
Payer: MEDICARE

## 2018-11-16 ENCOUNTER — OUTPATIENT (OUTPATIENT)
Dept: OUTPATIENT SERVICES | Facility: HOSPITAL | Age: 83
LOS: 1 days | End: 2018-11-16
Payer: MEDICARE

## 2018-11-16 DIAGNOSIS — C34.90 MALIGNANT NEOPLASM OF UNSPECIFIED PART OF UNSPECIFIED BRONCHUS OR LUNG: ICD-10-CM

## 2018-11-16 DIAGNOSIS — Z98.89 OTHER SPECIFIED POSTPROCEDURAL STATES: Chronic | ICD-10-CM

## 2018-11-16 DIAGNOSIS — H26.9 UNSPECIFIED CATARACT: Chronic | ICD-10-CM

## 2018-11-16 PROCEDURE — 71046 X-RAY EXAM CHEST 2 VIEWS: CPT | Mod: 26

## 2018-11-16 PROCEDURE — 71046 X-RAY EXAM CHEST 2 VIEWS: CPT

## 2018-11-20 ENCOUNTER — APPOINTMENT (OUTPATIENT)
Dept: THORACIC SURGERY | Facility: CLINIC | Age: 83
End: 2018-11-20
Payer: MEDICARE

## 2018-11-20 VITALS
OXYGEN SATURATION: 98 % | WEIGHT: 158 LBS | DIASTOLIC BLOOD PRESSURE: 74 MMHG | SYSTOLIC BLOOD PRESSURE: 148 MMHG | TEMPERATURE: 98.2 F | BODY MASS INDEX: 29.08 KG/M2 | HEART RATE: 102 BPM | RESPIRATION RATE: 16 BRPM | HEIGHT: 62 IN

## 2018-11-20 PROCEDURE — 99213 OFFICE O/P EST LOW 20 MIN: CPT

## 2018-11-20 RX ORDER — BECLOMETHASONE DIPROPIONATE HFA 80 UG/1
80 AEROSOL, METERED RESPIRATORY (INHALATION)
Refills: 3 | Status: DISCONTINUED | COMMUNITY
End: 2018-11-20

## 2018-11-20 RX ORDER — NITROFURANTOIN (MONOHYDRATE/MACROCRYSTALS) 25; 75 MG/1; MG/1
100 CAPSULE ORAL
Qty: 10 | Refills: 0 | Status: DISCONTINUED | COMMUNITY
Start: 2018-09-28 | End: 2018-11-20

## 2018-11-20 RX ORDER — LOSARTAN POTASSIUM 50 MG/1
50 TABLET, FILM COATED ORAL
Refills: 0 | Status: DISCONTINUED | COMMUNITY
End: 2018-11-20

## 2018-11-27 ENCOUNTER — RX RENEWAL (OUTPATIENT)
Age: 83
End: 2018-11-27

## 2018-12-05 ENCOUNTER — APPOINTMENT (OUTPATIENT)
Dept: PULMONOLOGY | Facility: CLINIC | Age: 83
End: 2018-12-05
Payer: MEDICARE

## 2018-12-05 VITALS
HEART RATE: 79 BPM | SYSTOLIC BLOOD PRESSURE: 174 MMHG | HEIGHT: 62 IN | OXYGEN SATURATION: 97 % | DIASTOLIC BLOOD PRESSURE: 100 MMHG | WEIGHT: 149 LBS | TEMPERATURE: 97.5 F | BODY MASS INDEX: 27.42 KG/M2

## 2018-12-05 PROCEDURE — 71046 X-RAY EXAM CHEST 2 VIEWS: CPT

## 2018-12-05 PROCEDURE — 95012 NITRIC OXIDE EXP GAS DETER: CPT

## 2018-12-05 PROCEDURE — 94060 EVALUATION OF WHEEZING: CPT

## 2018-12-05 PROCEDURE — 99214 OFFICE O/P EST MOD 30 MIN: CPT | Mod: 25

## 2018-12-20 ENCOUNTER — FORM ENCOUNTER (OUTPATIENT)
Age: 83
End: 2018-12-20

## 2018-12-21 ENCOUNTER — OUTPATIENT (OUTPATIENT)
Dept: OUTPATIENT SERVICES | Facility: HOSPITAL | Age: 83
LOS: 1 days | End: 2018-12-21
Payer: MEDICARE

## 2018-12-21 ENCOUNTER — NON-APPOINTMENT (OUTPATIENT)
Age: 83
End: 2018-12-21

## 2018-12-21 ENCOUNTER — APPOINTMENT (OUTPATIENT)
Dept: CARDIOLOGY | Facility: CLINIC | Age: 83
End: 2018-12-21
Payer: MEDICARE

## 2018-12-21 VITALS
DIASTOLIC BLOOD PRESSURE: 75 MMHG | BODY MASS INDEX: 27.6 KG/M2 | SYSTOLIC BLOOD PRESSURE: 165 MMHG | OXYGEN SATURATION: 97 % | HEART RATE: 110 BPM | WEIGHT: 150 LBS | HEIGHT: 62 IN

## 2018-12-21 DIAGNOSIS — Z98.89 OTHER SPECIFIED POSTPROCEDURAL STATES: Chronic | ICD-10-CM

## 2018-12-21 DIAGNOSIS — I73.9 PERIPHERAL VASCULAR DISEASE, UNSPECIFIED: ICD-10-CM

## 2018-12-21 DIAGNOSIS — H26.9 UNSPECIFIED CATARACT: Chronic | ICD-10-CM

## 2018-12-21 PROCEDURE — 93880 EXTRACRANIAL BILAT STUDY: CPT | Mod: 26

## 2018-12-21 PROCEDURE — 93922 UPR/L XTREMITY ART 2 LEVELS: CPT

## 2018-12-21 PROCEDURE — 99204 OFFICE O/P NEW MOD 45 MIN: CPT

## 2018-12-21 PROCEDURE — 93923 UPR/LXTR ART STDY 3+ LVLS: CPT

## 2018-12-21 PROCEDURE — 93880 EXTRACRANIAL BILAT STUDY: CPT

## 2018-12-21 PROCEDURE — 93931 UPPER EXTREMITY STUDY: CPT

## 2018-12-21 PROCEDURE — 93931 UPPER EXTREMITY STUDY: CPT | Mod: 26

## 2018-12-21 PROCEDURE — 93922 UPR/L XTREMITY ART 2 LEVELS: CPT | Mod: 26

## 2018-12-24 LAB
ALBUMIN SERPL ELPH-MCNC: 4.6 G/DL
ALP BLD-CCNC: 72 U/L
ALT SERPL-CCNC: 28 U/L
ANA SER IF-ACNC: NEGATIVE
ANION GAP SERPL CALC-SCNC: 12 MMOL/L
AST SERPL-CCNC: 26 U/L
BASOPHILS # BLD AUTO: 0.03 K/UL
BASOPHILS NFR BLD AUTO: 0.3 %
BILIRUB SERPL-MCNC: 0.4 MG/DL
BUN SERPL-MCNC: 14 MG/DL
CALCIUM SERPL-MCNC: 10.4 MG/DL
CHLORIDE SERPL-SCNC: 101 MMOL/L
CO2 SERPL-SCNC: 25 MMOL/L
CREAT SERPL-MCNC: 0.93 MG/DL
EOSINOPHIL # BLD AUTO: 0.03 K/UL
EOSINOPHIL NFR BLD AUTO: 0.3 %
GLUCOSE SERPL-MCNC: 107 MG/DL
HCT VFR BLD CALC: 42.1 %
HGB BLD-MCNC: 14 G/DL
IMM GRANULOCYTES NFR BLD AUTO: 0.3 %
LYMPHOCYTES # BLD AUTO: 1.96 K/UL
LYMPHOCYTES NFR BLD AUTO: 17.8 %
MAN DIFF?: NORMAL
MCHC RBC-ENTMCNC: 30.9 PG
MCHC RBC-ENTMCNC: 33.3 GM/DL
MCV RBC AUTO: 92.9 FL
MONOCYTES # BLD AUTO: 0.54 K/UL
MONOCYTES NFR BLD AUTO: 4.9 %
NEUTROPHILS # BLD AUTO: 8.42 K/UL
NEUTROPHILS NFR BLD AUTO: 76.4 %
PLATELET # BLD AUTO: 444 K/UL
POTASSIUM SERPL-SCNC: 4.6 MMOL/L
PROT SERPL-MCNC: 7.2 G/DL
RBC # BLD: 4.53 M/UL
RBC # FLD: 15.1 %
SODIUM SERPL-SCNC: 138 MMOL/L
TSH SERPL-ACNC: 1.93 UIU/ML
WBC # FLD AUTO: 11.01 K/UL

## 2018-12-26 ENCOUNTER — RX RENEWAL (OUTPATIENT)
Age: 83
End: 2018-12-26

## 2019-01-08 ENCOUNTER — APPOINTMENT (OUTPATIENT)
Dept: UROGYNECOLOGY | Facility: CLINIC | Age: 84
End: 2019-01-08
Payer: MEDICARE

## 2019-01-08 PROCEDURE — 99213 OFFICE O/P EST LOW 20 MIN: CPT

## 2019-01-09 NOTE — HISTORY OF PRESENT ILLNESS
[FreeTextEntry1] : Pt presents to office for f/u of prolapse.  Denies any issues with support of pessary.

## 2019-01-09 NOTE — PROCEDURE
[Good Fit] : fits well [Pessary Washed] : washed [H2O] : H2O [None] : no bleeding [Refit] : refit is not needed [Erosion] : no evidence of erosion [Erythema] : no erythema [Discharge] : no vaginal discharge [Infection] : no evidence of infection [FreeTextEntry1] : GH 2 1/2 LS [FreeTextEntry3] : vaginal estrogen [FreeTextEntry8] : routine dao-care

## 2019-02-04 ENCOUNTER — APPOINTMENT (OUTPATIENT)
Dept: MRI IMAGING | Facility: CLINIC | Age: 84
End: 2019-02-04

## 2019-02-04 ENCOUNTER — OUTPATIENT (OUTPATIENT)
Dept: OUTPATIENT SERVICES | Facility: HOSPITAL | Age: 84
LOS: 1 days | End: 2019-02-04
Payer: MEDICARE

## 2019-02-04 DIAGNOSIS — Z98.89 OTHER SPECIFIED POSTPROCEDURAL STATES: Chronic | ICD-10-CM

## 2019-02-04 DIAGNOSIS — Z00.8 ENCOUNTER FOR OTHER GENERAL EXAMINATION: ICD-10-CM

## 2019-02-04 DIAGNOSIS — H26.9 UNSPECIFIED CATARACT: Chronic | ICD-10-CM

## 2019-02-04 PROCEDURE — 70547 MR ANGIOGRAPHY NECK W/O DYE: CPT

## 2019-02-04 PROCEDURE — 70547 MR ANGIOGRAPHY NECK W/O DYE: CPT | Mod: 26

## 2019-02-04 PROCEDURE — 70544 MR ANGIOGRAPHY HEAD W/O DYE: CPT

## 2019-02-04 PROCEDURE — 70551 MRI BRAIN STEM W/O DYE: CPT

## 2019-02-04 PROCEDURE — 70551 MRI BRAIN STEM W/O DYE: CPT | Mod: 26

## 2019-03-06 ENCOUNTER — APPOINTMENT (OUTPATIENT)
Dept: PULMONOLOGY | Facility: CLINIC | Age: 84
End: 2019-03-06
Payer: MEDICARE

## 2019-03-06 VITALS
SYSTOLIC BLOOD PRESSURE: 137 MMHG | WEIGHT: 148 LBS | HEART RATE: 93 BPM | DIASTOLIC BLOOD PRESSURE: 83 MMHG | HEIGHT: 62 IN | RESPIRATION RATE: 14 BRPM | BODY MASS INDEX: 27.23 KG/M2 | OXYGEN SATURATION: 97 %

## 2019-03-06 PROCEDURE — 99213 OFFICE O/P EST LOW 20 MIN: CPT | Mod: 25

## 2019-03-06 PROCEDURE — 94729 DIFFUSING CAPACITY: CPT

## 2019-03-06 PROCEDURE — 95012 NITRIC OXIDE EXP GAS DETER: CPT

## 2019-03-06 PROCEDURE — 94060 EVALUATION OF WHEEZING: CPT

## 2019-03-06 RX ORDER — BECLOMETHASONE DIPROPIONATE HFA 80 UG/1
80 AEROSOL, METERED RESPIRATORY (INHALATION)
Refills: 0 | Status: DISCONTINUED | COMMUNITY
End: 2019-03-06

## 2019-03-06 RX ORDER — ALBUTEROL SULFATE 90 UG/1
108 (90 BASE) AEROSOL, METERED RESPIRATORY (INHALATION)
Qty: 1 | Refills: 3 | Status: ACTIVE | COMMUNITY
Start: 1900-01-01 | End: 1900-01-01

## 2019-03-06 NOTE — ASSESSMENT
[FreeTextEntry1] : Overall condition stable. Would not change medication at this time.\par \par History of lung cancer status post surgery gets chest CT followup via thoracic surgery.

## 2019-03-06 NOTE — PHYSICAL EXAM
[General Appearance - Well Developed] : well developed [Normal Appearance] : normal appearance [Well Groomed] : well groomed [General Appearance - Well Nourished] : well nourished [No Deformities] : no deformities [General Appearance - In No Acute Distress] : no acute distress [Normal Conjunctiva] : the conjunctiva exhibited no abnormalities [Eyelids - No Xanthelasma] : the eyelids demonstrated no xanthelasmas [Normal Oropharynx] : normal oropharynx [Neck Appearance] : the appearance of the neck was normal [Neck Cervical Mass (___cm)] : no neck mass was observed [Jugular Venous Distention Increased] : there was no jugular-venous distention [Thyroid Diffuse Enlargement] : the thyroid was not enlarged [Thyroid Nodule] : there were no palpable thyroid nodules [Heart Rate And Rhythm] : heart rate and rhythm were normal [Heart Sounds] : normal S1 and S2 [Murmurs] : no murmurs present [Respiration, Rhythm And Depth] : normal respiratory rhythm and effort [Exaggerated Use Of Accessory Muscles For Inspiration] : no accessory muscle use [Auscultation Breath Sounds / Voice Sounds] : lungs were clear to auscultation bilaterally [Abdomen Soft] : soft [Abdomen Tenderness] : non-tender [Abdomen Mass (___ Cm)] : no abdominal mass palpated [Abnormal Walk] : normal gait [Gait - Sufficient For Exercise Testing] : the gait was sufficient for exercise testing [FreeTextEntry2] : Right hand does have palpable pulse but is notably cooler and paler than left hand. No change after removal of compression stocking [Skin Color & Pigmentation] : normal skin color and pigmentation [] : no rash [No Venous Stasis] : no venous stasis [Skin Lesions] : no skin lesions [No Skin Ulcers] : no skin ulcer [No Xanthoma] : no  xanthoma was observed [Deep Tendon Reflexes (DTR)] : deep tendon reflexes were 2+ and symmetric [Sensation] : the sensory exam was normal to light touch and pinprick [No Focal Deficits] : no focal deficits [Oriented To Time, Place, And Person] : oriented to person, place, and time [Impaired Insight] : insight and judgment were intact [Affect] : the affect was normal

## 2019-03-07 ENCOUNTER — RX RENEWAL (OUTPATIENT)
Age: 84
End: 2019-03-07

## 2019-03-07 ENCOUNTER — TRANSCRIPTION ENCOUNTER (OUTPATIENT)
Age: 84
End: 2019-03-07

## 2019-03-20 NOTE — CONSULT LETTER
[FreeTextEntry3] : \par \par \par Thom Aguilar MD, FACS \par Chief, Division of Thoracic Surgery \par Director, Minimally Invasive Thoracic Surgery \par Department of Cardiovascular and Thoracic Surgery \par [facility] \par , Cardiovascular and Thoracic Surgery

## 2019-03-20 NOTE — HISTORY OF PRESENT ILLNESS
[FreeTextEntry1] : 83 year old female h/o Left VATS, wedge resection on 7/6/15 with intraoperative pathology showing negative for malignancy. Final pathology revealed 2 separate adenocarcinomas. First tumor measured 2.0 x 1.0 cm and was a T1a Nx Mx. Second tumor measured 1.1 x 1.0 cm and was a T1a Nx Mx. On 7/31/2015, patient underwent a redo Left VATS, completion of left upper lobectomy and mediastinal lymph node dissection for 2 separate adenocarcinomas. \par \par PMHx is significant for Breast cancer diagnosed in 1999 S/P Bilateral mastectomy and node resection b/l  and tamoxifen. \par She presents today with c/o of 'cold sensation of the right side' She has been diagnosed with left vertebral artery stenosis 70%.

## 2019-03-20 NOTE — REVIEW OF SYSTEMS
[Cough] : cough [SOB on Exertion] : shortness of breath during exertion [Negative] : Heme/Lymph [Shortness Of Breath] : no shortness of breath [Wheezing] : no wheezing [Orthopnea] : no orthopnea [PND] : no PND

## 2019-03-20 NOTE — ASSESSMENT
[FreeTextEntry1] : 83 year old female h/o Left VATS, wedge resection on 7/6/15 with intraoperative pathology showing negative for malignancy. Final pathology revealed 2 separate adenocarcinomas. First tumor measured 2.0 x 1.0 cm and was a T1a Nx Mx. Second tumor measured 1.1 x 1.0 cm and was a T1a Nx Mx. On 7/31/2015, patient underwent a redo Left VATS, completion of left upper lobectomy and mediastinal lymph node dissection for 2 separate adenocarcinomas. \par \par PMHx is significant for Breast cancer diagnosed in 1999 S/P Bilateral mastectomy and node resection b/l  and tamoxifen. \par She presents today with c/o of 'cold sensation of the right side' She has been diagnosed with left vertebral artery stenosis 70%. \par \par \par plan:\par -Right upper ext arterial duplex \par -Winnie/pvr upper ext \par -Carotid duplex \par -TSH, OMERO, CBC   \par \par

## 2019-03-20 NOTE — PHYSICAL EXAM
[Sclera] : the sclera and conjunctiva were normal [] : the neck was supple [Neck Cervical Mass (___cm)] : no neck mass was observed [Respiration, Rhythm And Depth] : normal respiratory rhythm and effort [Auscultation Breath Sounds / Voice Sounds] : lungs were clear to auscultation bilaterally [Heart Rate And Rhythm] : heart rate was normal and rhythm regular [Heart Sounds] : normal S1 and S2 [Examination Of The Chest] : the chest was normal in appearance [Chest Visual Inspection Thoracic Asymmetry] : no chest asymmetry [Diminished Respiratory Excursion] : normal chest expansion [Abdomen Soft] : soft [Abdomen Tenderness] : non-tender [Cervical Lymph Nodes Enlarged Posterior Bilaterally] : posterior cervical [Cervical Lymph Nodes Enlarged Anterior Bilaterally] : anterior cervical [Supraclavicular Lymph Nodes Enlarged Bilaterally] : supraclavicular [No CVA Tenderness] : no ~M costovertebral angle tenderness [No Spinal Tenderness] : no spinal tenderness [Abnormal Walk] : normal gait [Nail Clubbing] : no clubbing  or cyanosis of the fingernails [Musculoskeletal - Swelling] : no joint swelling seen [Motor Tone] : muscle strength and tone were normal [Skin Color & Pigmentation] : normal skin color and pigmentation [No Focal Deficits] : no focal deficits [Oriented To Time, Place, And Person] : oriented to person, place, and time [Impaired Insight] : insight and judgment were intact [Affect] : the affect was normal [FreeTextEntry1] : deferred

## 2019-04-09 ENCOUNTER — APPOINTMENT (OUTPATIENT)
Dept: UROGYNECOLOGY | Facility: CLINIC | Age: 84
End: 2019-04-09
Payer: MEDICARE

## 2019-04-09 PROCEDURE — 99213 OFFICE O/P EST LOW 20 MIN: CPT

## 2019-04-09 NOTE — PHYSICAL EXAM
[No Acute Distress] : in no acute distress [Well developed] : well developed [Oriented x3] : oriented to person, place, and time [Good Hygeine] : demonstrates good hygeine [Well Nourished] : ~L well nourished [Normal Mood/Affect] : mood and affect are normal [Normal Memory] : ~T memory was ~L unimpaired [Warm and Dry] : was warm and dry to touch [Normal Gait] : gait was normal [Labia Majora] : were normal [Labia Minora] : were normal [Normal Appearance] : general appearance was normal [No Bleeding] : there was no active vaginal bleeding [Atrophy] : atrophy [Normal] : normal [Distended] : not distended [Tenderness] : ~T no ~M abdominal tenderness observed [Anxiety] : patient is not anxious

## 2019-04-09 NOTE — PROCEDURE
[Good Fit] : fits well [Pessary Washed] : washed [None] : no bleeding [H2O] : H2O [Erythema] : no erythema [Refit] : refit is not needed [Erosion] : no evidence of erosion [Infection] : no evidence of infection [Discharge] : no vaginal discharge [FreeTextEntry1] : GH 2 1/2 LS [FreeTextEntry3] : vaginal estrogen [FreeTextEntry8] : routine dao-care

## 2019-05-01 ENCOUNTER — RX RENEWAL (OUTPATIENT)
Age: 84
End: 2019-05-01

## 2019-05-13 ENCOUNTER — FORM ENCOUNTER (OUTPATIENT)
Age: 84
End: 2019-05-13

## 2019-05-14 ENCOUNTER — APPOINTMENT (OUTPATIENT)
Dept: CT IMAGING | Facility: CLINIC | Age: 84
End: 2019-05-14
Payer: MEDICARE

## 2019-05-14 ENCOUNTER — OUTPATIENT (OUTPATIENT)
Dept: OUTPATIENT SERVICES | Facility: HOSPITAL | Age: 84
LOS: 1 days | End: 2019-05-14
Payer: MEDICARE

## 2019-05-14 DIAGNOSIS — Z98.89 OTHER SPECIFIED POSTPROCEDURAL STATES: Chronic | ICD-10-CM

## 2019-05-14 DIAGNOSIS — C34.90 MALIGNANT NEOPLASM OF UNSPECIFIED PART OF UNSPECIFIED BRONCHUS OR LUNG: ICD-10-CM

## 2019-05-14 DIAGNOSIS — H26.9 UNSPECIFIED CATARACT: Chronic | ICD-10-CM

## 2019-05-14 PROCEDURE — 82565 ASSAY OF CREATININE: CPT

## 2019-05-14 PROCEDURE — 71260 CT THORAX DX C+: CPT | Mod: 26

## 2019-05-14 PROCEDURE — 71260 CT THORAX DX C+: CPT

## 2019-05-21 ENCOUNTER — APPOINTMENT (OUTPATIENT)
Dept: THORACIC SURGERY | Facility: CLINIC | Age: 84
End: 2019-05-21
Payer: MEDICARE

## 2019-05-21 VITALS
OXYGEN SATURATION: 97 % | BODY MASS INDEX: 27.42 KG/M2 | DIASTOLIC BLOOD PRESSURE: 81 MMHG | WEIGHT: 149 LBS | HEIGHT: 62 IN | HEART RATE: 82 BPM | SYSTOLIC BLOOD PRESSURE: 131 MMHG | RESPIRATION RATE: 16 BRPM | TEMPERATURE: 97.9 F

## 2019-05-21 PROCEDURE — 99214 OFFICE O/P EST MOD 30 MIN: CPT

## 2019-05-21 RX ORDER — LOSARTAN POTASSIUM 50 MG/1
50 TABLET, FILM COATED ORAL
Refills: 0 | Status: DISCONTINUED | COMMUNITY
End: 2019-05-21

## 2019-05-21 NOTE — PHYSICAL EXAM
[Sclera] : the sclera and conjunctiva were normal [Extraocular Movements] : extraocular movements were intact [Neck Appearance] : the appearance of the neck was normal [Neck Cervical Mass (___cm)] : no neck mass was observed [Jugular Venous Distention Increased] : there was no jugular-venous distention [] : no respiratory distress [Respiration, Rhythm And Depth] : normal respiratory rhythm and effort [Exaggerated Use Of Accessory Muscles For Inspiration] : no accessory muscle use [Auscultation Breath Sounds / Voice Sounds] : lungs were clear to auscultation bilaterally [Heart Rate And Rhythm] : heart rate was normal and rhythm regular [Diminished Respiratory Excursion] : normal chest expansion [Bowel Sounds] : normal bowel sounds [Abdomen Soft] : soft [Abdomen Tenderness] : non-tender [Cervical Lymph Nodes Enlarged Posterior Bilaterally] : posterior cervical [Cervical Lymph Nodes Enlarged Anterior Bilaterally] : anterior cervical [Supraclavicular Lymph Nodes Enlarged Bilaterally] : supraclavicular [Abnormal Walk] : normal gait [Skin Color & Pigmentation] : normal skin color and pigmentation [No Focal Deficits] : no focal deficits [Oriented To Time, Place, And Person] : oriented to person, place, and time [Impaired Insight] : insight and judgment were intact [Affect] : the affect was normal [Mood] : the mood was normal

## 2019-05-28 NOTE — HISTORY OF PRESENT ILLNESS
[FreeTextEntry1] : 83 year old female S/P Left VATS, wedge resection on 7/6/15 with intraoperative pathology showing negative for malignancy. Final pathology revealed 2 separate adenocarcinomas. First tumor measured 2.0 x 1.0 cm and was a T1a Nx Mx. Second tumor measured 1.1 x 1.0 cm and was a T1a Nx Mx. On 7/31/2015, patient underwent a redo Left VATS, completion of left upper lobectomy and mediastinal lymph node dissection for 2 separate adenocarcinomas. \par \par PMHx is significant for Breast cancer diagnosed in 1999 S/P Bilateral mastectomy and tamoxifen. \par \par Chest CT revealed 5/14/19:\par - Two nodules measuring less than 0.5 cm are noted in the anterior segment of the RLL and the lingular, unchanged when compared to previous exam. \par - Moderate size hiatal hernia is noted. \par \par Chest X-ray done on 11/16/18: Unremarkable \par  \par She feels well, but admits to a dry cough and shortness of breath with exertion. She is followed by Dr. Morales. She denies fever, chills, chest pain, dysphagia, hemoptysis, or recent illness. \par  \par

## 2019-05-28 NOTE — CONSULT LETTER
[Dear  ___] : Dear  [unfilled], [Courtesy Letter:] : I had the pleasure of seeing your patient, [unfilled], in my office today. [Please see my note below.] : Please see my note below. [Sincerely,] : Sincerely, [FreeTextEntry2] : Dr. Koko Peck (PCP)\par Dr. Waqar Sagastume (Onc)\par Dr. Fredrick Salinas (GI)\par Dr. Morales (Pulm) \par  [FreeTextEntry3] : \par \par \par Thom Aguilar MD, FACS \par Chief, Division of Thoracic Surgery \par Director, Minimally Invasive Thoracic Surgery \par Department of Cardiovascular and Thoracic Surgery \par [facility] \par , Cardiovascular and Thoracic Surgery

## 2019-05-28 NOTE — ASSESSMENT
[FreeTextEntry1] : 84 y/o female S/P Left VATS, wedge resection on 7/6/15 with intraoperative pathology showing negative for malignancy. Final pathology revealed 2 separate adenocarcinomas. First tumor measured 2.0 x 1.0 cm and was a T1a Nx Mx. Second tumor measured 1.1 x 1.0 cm and was a T1a Nx Mx. On 7/31/2015, patient underwent a redo Left VATS, completion of left upper lobectomy and mediastinal lymph node dissection for 2 separate adenocarcinomas. \par \par Chest CT revealed 5/14/19:\par -s/p left upper lobectomy. \par -Two nodules measuring less than 0.5 cm are noted in the anterior segment of the RLL and the lingular, unchanged when compared to previous exam. \par - Moderate size hiatal hernia is noted. \par  \par I have reviewed the patient's medical records and diagnostic images during the time of this office visit, and I have made the following recommendation:\par 1. I have recommended that the patient follow up in 6 months with a CXR. \par \par Written by Jenna Farris NP, acting as a scribe for Thom Aguilar MD.\par The documentation recorded by the scribe accurately reflects the service I personally performed and the decisions made by me. Thom Aguilar MD\par \par

## 2019-06-19 ENCOUNTER — APPOINTMENT (OUTPATIENT)
Dept: PULMONOLOGY | Facility: CLINIC | Age: 84
End: 2019-06-19
Payer: MEDICARE

## 2019-06-19 VITALS
OXYGEN SATURATION: 97 % | HEART RATE: 91 BPM | SYSTOLIC BLOOD PRESSURE: 141 MMHG | HEIGHT: 62 IN | DIASTOLIC BLOOD PRESSURE: 82 MMHG | BODY MASS INDEX: 27.42 KG/M2 | WEIGHT: 149 LBS

## 2019-06-19 PROCEDURE — 99213 OFFICE O/P EST LOW 20 MIN: CPT | Mod: 25

## 2019-06-19 PROCEDURE — 95012 NITRIC OXIDE EXP GAS DETER: CPT

## 2019-06-19 NOTE — PROCEDURE
[FreeTextEntry1] : EXAM: CT CHEST IC \par \par \par PROCEDURE DATE: 05/14/2019 \par \par \par \par INTERPRETATION: Clinical information: Adenocarcinoma the lung. Exam is \par compared to previous study of 5/11/2018. \par \par CT scan of the chest was obtained following administration of intravenous \par contrast. Approximately 40 cc of Omnipaque 350 was administered. \par \par No hilar and/or mediastinal adenopathy is noted. \par \par Heart is normal in size. No pericardial effusion is noted. \par \par Moderate size hiatal hernia is noted. \par \par No endobronchial lesions are noted. Patient is status post left upper \par lobectomy. Two nodules measuring less than 0.5 cm are noted in the anterior \par segment of the right lower lobe and the lingular. They are unchanged when \par compared to previous exam. No pleural effusions are noted. \par \par Below the diaphragm, visualized portions of the abdomen demonstrate \par low-attenuation lesion in the liver which is unchanged when compared to \par previous exam. \par \par Degenerative changes of the spine are noted. \par \par Impression: Status post left upper lobectomy. \par \par Stable two very small nodules in the lingula and the anterior segment of the \par right lower lobe when compared to previous exam. \par \par

## 2019-06-19 NOTE — ASSESSMENT
[FreeTextEntry1] : Overall status improved. Stable from asthma perspective. Recent imaging satisfactory. Followup 3 months

## 2019-06-19 NOTE — PHYSICAL EXAM
[General Appearance - Well Developed] : well developed [Well Groomed] : well groomed [Normal Appearance] : normal appearance [General Appearance - Well Nourished] : well nourished [No Deformities] : no deformities [General Appearance - In No Acute Distress] : no acute distress [Normal Conjunctiva] : the conjunctiva exhibited no abnormalities [Normal Oropharynx] : normal oropharynx [Neck Appearance] : the appearance of the neck was normal [Eyelids - No Xanthelasma] : the eyelids demonstrated no xanthelasmas [Neck Cervical Mass (___cm)] : no neck mass was observed [Thyroid Diffuse Enlargement] : the thyroid was not enlarged [Jugular Venous Distention Increased] : there was no jugular-venous distention [Thyroid Nodule] : there were no palpable thyroid nodules [Heart Rate And Rhythm] : heart rate and rhythm were normal [Heart Sounds] : normal S1 and S2 [Murmurs] : no murmurs present [Respiration, Rhythm And Depth] : normal respiratory rhythm and effort [Exaggerated Use Of Accessory Muscles For Inspiration] : no accessory muscle use [Abdomen Soft] : soft [Auscultation Breath Sounds / Voice Sounds] : lungs were clear to auscultation bilaterally [Abdomen Tenderness] : non-tender [Abdomen Mass (___ Cm)] : no abdominal mass palpated [Abnormal Walk] : normal gait [Gait - Sufficient For Exercise Testing] : the gait was sufficient for exercise testing [Skin Color & Pigmentation] : normal skin color and pigmentation [No Venous Stasis] : no venous stasis [] : no rash [No Skin Ulcers] : no skin ulcer [Skin Lesions] : no skin lesions [No Xanthoma] : no  xanthoma was observed [Deep Tendon Reflexes (DTR)] : deep tendon reflexes were 2+ and symmetric [No Focal Deficits] : no focal deficits [Sensation] : the sensory exam was normal to light touch and pinprick [Oriented To Time, Place, And Person] : oriented to person, place, and time [Impaired Insight] : insight and judgment were intact [Affect] : the affect was normal [FreeTextEntry2] : Right hand does have palpable pulse but is notably cooler and paler than left hand. No change after removal of compression stocking

## 2019-07-16 ENCOUNTER — APPOINTMENT (OUTPATIENT)
Dept: UROGYNECOLOGY | Facility: CLINIC | Age: 84
End: 2019-07-16
Payer: MEDICARE

## 2019-07-16 PROCEDURE — 99213 OFFICE O/P EST LOW 20 MIN: CPT

## 2019-09-03 ENCOUNTER — RX RENEWAL (OUTPATIENT)
Age: 84
End: 2019-09-03

## 2019-09-18 ENCOUNTER — APPOINTMENT (OUTPATIENT)
Dept: PULMONOLOGY | Facility: CLINIC | Age: 84
End: 2019-09-18
Payer: MEDICARE

## 2019-09-18 VITALS
HEART RATE: 100 BPM | RESPIRATION RATE: 16 BRPM | DIASTOLIC BLOOD PRESSURE: 77 MMHG | SYSTOLIC BLOOD PRESSURE: 121 MMHG | HEIGHT: 62 IN | BODY MASS INDEX: 27.42 KG/M2 | WEIGHT: 149 LBS | OXYGEN SATURATION: 97 %

## 2019-09-18 PROCEDURE — 95012 NITRIC OXIDE EXP GAS DETER: CPT

## 2019-09-18 PROCEDURE — 99213 OFFICE O/P EST LOW 20 MIN: CPT | Mod: 25

## 2019-09-18 PROCEDURE — 94060 EVALUATION OF WHEEZING: CPT

## 2019-10-23 ENCOUNTER — APPOINTMENT (OUTPATIENT)
Dept: UROGYNECOLOGY | Facility: CLINIC | Age: 84
End: 2019-10-23
Payer: MEDICARE

## 2019-10-23 DIAGNOSIS — N39.46 MIXED INCONTINENCE: ICD-10-CM

## 2019-10-23 PROCEDURE — 99213 OFFICE O/P EST LOW 20 MIN: CPT | Mod: 25

## 2019-10-23 NOTE — PROCEDURE
[Good Fit] : fits well [Pessary Washed] : washed [H2O] : H2O [None] : no bleeding [Erosion] : no evidence of erosion [Refit] : refit is not needed [Discharge] : no vaginal discharge [Erythema] : no erythema [FreeTextEntry1] : GH 2 1/2 LS [Infection] : no evidence of infection [FreeTextEntry8] : routine dao-care [FreeTextEntry3] : vaginal estrogen

## 2019-10-23 NOTE — DISCUSSION/SUMMARY
[FreeTextEntry1] : Pt is doing well with pessary. She will RTO in 3 months or sooner if needed.  Pt prefers to go to [place] as this is most convenient for her.

## 2019-10-28 ENCOUNTER — RX RENEWAL (OUTPATIENT)
Age: 84
End: 2019-10-28

## 2019-12-01 ENCOUNTER — FORM ENCOUNTER (OUTPATIENT)
Age: 84
End: 2019-12-01

## 2019-12-02 ENCOUNTER — OUTPATIENT (OUTPATIENT)
Dept: OUTPATIENT SERVICES | Facility: HOSPITAL | Age: 84
LOS: 1 days | End: 2019-12-02
Payer: MEDICARE

## 2019-12-02 ENCOUNTER — RX RENEWAL (OUTPATIENT)
Age: 84
End: 2019-12-02

## 2019-12-02 ENCOUNTER — APPOINTMENT (OUTPATIENT)
Dept: RADIOLOGY | Facility: CLINIC | Age: 84
End: 2019-12-02
Payer: MEDICARE

## 2019-12-02 DIAGNOSIS — Z98.89 OTHER SPECIFIED POSTPROCEDURAL STATES: Chronic | ICD-10-CM

## 2019-12-02 DIAGNOSIS — H26.9 UNSPECIFIED CATARACT: Chronic | ICD-10-CM

## 2019-12-02 DIAGNOSIS — Z00.8 ENCOUNTER FOR OTHER GENERAL EXAMINATION: ICD-10-CM

## 2019-12-02 PROCEDURE — 71046 X-RAY EXAM CHEST 2 VIEWS: CPT

## 2019-12-02 PROCEDURE — 71046 X-RAY EXAM CHEST 2 VIEWS: CPT | Mod: 26

## 2019-12-10 ENCOUNTER — APPOINTMENT (OUTPATIENT)
Dept: THORACIC SURGERY | Facility: CLINIC | Age: 84
End: 2019-12-10
Payer: MEDICARE

## 2019-12-10 VITALS
BODY MASS INDEX: 27.44 KG/M2 | OXYGEN SATURATION: 97 % | DIASTOLIC BLOOD PRESSURE: 82 MMHG | SYSTOLIC BLOOD PRESSURE: 130 MMHG | RESPIRATION RATE: 16 BRPM | HEART RATE: 98 BPM | TEMPERATURE: 98 F | WEIGHT: 150 LBS

## 2019-12-10 PROCEDURE — 99214 OFFICE O/P EST MOD 30 MIN: CPT

## 2019-12-10 NOTE — PHYSICAL EXAM
[Sclera] : the sclera and conjunctiva were normal [Extraocular Movements] : extraocular movements were intact [Neck Appearance] : the appearance of the neck was normal [Jugular Venous Distention Increased] : there was no jugular-venous distention [Neck Cervical Mass (___cm)] : no neck mass was observed [Respiration, Rhythm And Depth] : normal respiratory rhythm and effort [Exaggerated Use Of Accessory Muscles For Inspiration] : no accessory muscle use [] : no respiratory distress [Auscultation Breath Sounds / Voice Sounds] : lungs were clear to auscultation bilaterally [Heart Rate And Rhythm] : heart rate was normal and rhythm regular [Bowel Sounds] : normal bowel sounds [Diminished Respiratory Excursion] : normal chest expansion [Abdomen Soft] : soft [Abdomen Tenderness] : non-tender [Cervical Lymph Nodes Enlarged Posterior Bilaterally] : posterior cervical [Cervical Lymph Nodes Enlarged Anterior Bilaterally] : anterior cervical [Supraclavicular Lymph Nodes Enlarged Bilaterally] : supraclavicular [Abnormal Walk] : normal gait [No Focal Deficits] : no focal deficits [Skin Color & Pigmentation] : normal skin color and pigmentation [Oriented To Time, Place, And Person] : oriented to person, place, and time [Impaired Insight] : insight and judgment were intact [Affect] : the affect was normal [Mood] : the mood was normal

## 2019-12-17 NOTE — ASSESSMENT
[FreeTextEntry1] : 84 year old female S/P Left VATS, wedge resection on 7/6/15 with intraoperative pathology showing negative for malignancy. Final pathology revealed 2 separate adenocarcinomas. First tumor measured 2.0 x 1.0 cm and was a T1a Nx Mx. Second tumor measured 1.1 x 1.0 cm and was a T1a Nx Mx. On 7/31/2015, patient underwent a redo Left VATS, completion of left upper lobectomy and mediastinal lymph node dissection for 2 separate adenocarcinomas. \par \par PMHx is significant for Breast cancer diagnosed in 1999 S/P Bilateral mastectomy and tamoxifen. \par \par CXR 12/2/19: There is left lung volume loss with postsurgical change including chain sutures. No pneumothorax is seen. The subcentimeter right lower lobe and lingular nodules reported on the CT scan are not able to be seen. \par \par I have reviewed the images with the patient and made the following recommendations. I have recommended that the patient follow up in 6 months with a CT scan of the chest. \par \par Written by Jenna Farris NP, acting as a scribe for Thom Aguilar MD.\par The documentation recorded by the scribe accurately reflects the service I personally performed and the decisions made by me. Thom Aguilar MD\par \par

## 2019-12-17 NOTE — HISTORY OF PRESENT ILLNESS
[FreeTextEntry1] : 84 year old female S/P Left VATS, wedge resection on 7/6/15 with intraoperative pathology showing negative for malignancy. Final pathology revealed 2 separate adenocarcinomas. First tumor measured 2.0 x 1.0 cm and was a T1a Nx Mx. Second tumor measured 1.1 x 1.0 cm and was a T1a Nx Mx. On 7/31/2015, patient underwent a redo Left VATS, completion of left upper lobectomy and mediastinal lymph node dissection for 2 separate adenocarcinomas. \par \par PMHx is significant for Breast cancer diagnosed in 1999 S/P Bilateral mastectomy and tamoxifen. \par \par CXR 12/2/19 The right lung is clear. There is left lung volume loss with postsurgical change including chain \par sutures. No pneumothorax is seen. The subcentimeter right lower lobe and lingular nodules reported on the CT scan are not able to be seen. There is no pleural effusion. \par \par Chest CT revealed 5/14/19: Two nodules measuring less than 0.5 cm are noted in the anterior segment of the RLL and the lingular, unchanged when compared to previous exam. Moderate size hiatal hernia is noted. \par \par The patient reports non-productive cough. The patient denies shortness of breath, fever, chills, dysphagia or hemoptysis.

## 2019-12-18 ENCOUNTER — APPOINTMENT (OUTPATIENT)
Dept: PULMONOLOGY | Facility: CLINIC | Age: 84
End: 2019-12-18
Payer: MEDICARE

## 2019-12-18 VITALS
OXYGEN SATURATION: 96 % | WEIGHT: 148 LBS | HEART RATE: 86 BPM | DIASTOLIC BLOOD PRESSURE: 79 MMHG | SYSTOLIC BLOOD PRESSURE: 116 MMHG | BODY MASS INDEX: 27.23 KG/M2 | HEIGHT: 62 IN

## 2019-12-18 PROCEDURE — 94060 EVALUATION OF WHEEZING: CPT

## 2019-12-18 PROCEDURE — 99213 OFFICE O/P EST LOW 20 MIN: CPT | Mod: 25

## 2019-12-18 PROCEDURE — 95012 NITRIC OXIDE EXP GAS DETER: CPT

## 2019-12-18 NOTE — ASSESSMENT
[FreeTextEntry1] : Stable from pulmonary perspective\par Recommend ENT evaluation for hoarseness\par Do not wish to change medications at this time

## 2019-12-18 NOTE — PHYSICAL EXAM
[Well Groomed] : well groomed [General Appearance - Well Developed] : well developed [Normal Appearance] : normal appearance [General Appearance - In No Acute Distress] : no acute distress [No Deformities] : no deformities [General Appearance - Well Nourished] : well nourished [Eyelids - No Xanthelasma] : the eyelids demonstrated no xanthelasmas [Normal Conjunctiva] : the conjunctiva exhibited no abnormalities [Normal Oropharynx] : normal oropharynx [Neck Cervical Mass (___cm)] : no neck mass was observed [Jugular Venous Distention Increased] : there was no jugular-venous distention [Neck Appearance] : the appearance of the neck was normal [Heart Rate And Rhythm] : heart rate and rhythm were normal [Thyroid Diffuse Enlargement] : the thyroid was not enlarged [Thyroid Nodule] : there were no palpable thyroid nodules [Murmurs] : no murmurs present [Heart Sounds] : normal S1 and S2 [Exaggerated Use Of Accessory Muscles For Inspiration] : no accessory muscle use [Auscultation Breath Sounds / Voice Sounds] : lungs were clear to auscultation bilaterally [Respiration, Rhythm And Depth] : normal respiratory rhythm and effort [Abdomen Tenderness] : non-tender [Abdomen Soft] : soft [Gait - Sufficient For Exercise Testing] : the gait was sufficient for exercise testing [Abnormal Walk] : normal gait [Abdomen Mass (___ Cm)] : no abdominal mass palpated [Skin Color & Pigmentation] : normal skin color and pigmentation [] : no rash [FreeTextEntry2] : Right hand does have palpable pulse but is notably cooler and paler than left hand. No change after removal of compression stocking [No Venous Stasis] : no venous stasis [Skin Lesions] : no skin lesions [Deep Tendon Reflexes (DTR)] : deep tendon reflexes were 2+ and symmetric [No Skin Ulcers] : no skin ulcer [No Xanthoma] : no  xanthoma was observed [Sensation] : the sensory exam was normal to light touch and pinprick [No Focal Deficits] : no focal deficits [Oriented To Time, Place, And Person] : oriented to person, place, and time [Impaired Insight] : insight and judgment were intact [Affect] : the affect was normal

## 2019-12-18 NOTE — HISTORY OF PRESENT ILLNESS
[FreeTextEntry1] : Some cough, but overall ok\par Not SOB\par Some hoarseness\par does not feel significantly better with the addition of montelukast\par \par Had followup cardiothoracic evaluation for lung cancer-stable

## 2020-01-15 ENCOUNTER — APPOINTMENT (OUTPATIENT)
Dept: RADIOLOGY | Facility: CLINIC | Age: 85
End: 2020-01-15
Payer: MEDICARE

## 2020-01-15 ENCOUNTER — OUTPATIENT (OUTPATIENT)
Dept: OUTPATIENT SERVICES | Facility: HOSPITAL | Age: 85
LOS: 1 days | End: 2020-01-15
Payer: MEDICARE

## 2020-01-15 DIAGNOSIS — Z98.89 OTHER SPECIFIED POSTPROCEDURAL STATES: Chronic | ICD-10-CM

## 2020-01-15 DIAGNOSIS — H26.9 UNSPECIFIED CATARACT: Chronic | ICD-10-CM

## 2020-01-15 DIAGNOSIS — Z00.8 ENCOUNTER FOR OTHER GENERAL EXAMINATION: ICD-10-CM

## 2020-01-15 PROCEDURE — 71046 X-RAY EXAM CHEST 2 VIEWS: CPT | Mod: 26

## 2020-01-15 PROCEDURE — 71046 X-RAY EXAM CHEST 2 VIEWS: CPT

## 2020-02-05 ENCOUNTER — APPOINTMENT (OUTPATIENT)
Dept: UROGYNECOLOGY | Facility: CLINIC | Age: 85
End: 2020-02-05
Payer: MEDICARE

## 2020-02-05 PROCEDURE — 99213 OFFICE O/P EST LOW 20 MIN: CPT

## 2020-02-10 NOTE — PHYSICAL EXAM
[Well developed] : well developed [No Acute Distress] : in no acute distress [Well Nourished] : ~L well nourished [Good Hygeine] : demonstrates good hygeine [Oriented x3] : oriented to person, place, and time [Normal Memory] : ~T memory was ~L unimpaired [Normal Mood/Affect] : mood and affect are normal [Warm and Dry] : was warm and dry to touch [Normal Gait] : gait was normal [Labia Majora] : were normal [Labia Minora] : were normal [Normal Appearance] : general appearance was normal [Atrophy] : atrophy [No Bleeding] : there was no active vaginal bleeding [Normal] : normal [Anxiety] : patient is not anxious [Tenderness] : ~T no ~M abdominal tenderness observed [Distended] : not distended

## 2020-02-10 NOTE — DISCUSSION/SUMMARY
[FreeTextEntry1] : Pt is doing well with pessary. She will RTO in 3 months or sooner if needed. Pt agrees to call office with any problems/concerns.

## 2020-04-02 ENCOUNTER — RX RENEWAL (OUTPATIENT)
Age: 85
End: 2020-04-02

## 2020-05-02 ENCOUNTER — RX RENEWAL (OUTPATIENT)
Age: 85
End: 2020-05-02

## 2020-05-16 ENCOUNTER — RX RENEWAL (OUTPATIENT)
Age: 85
End: 2020-05-16

## 2020-06-16 ENCOUNTER — RESULT REVIEW (OUTPATIENT)
Age: 85
End: 2020-06-16

## 2020-06-16 ENCOUNTER — APPOINTMENT (OUTPATIENT)
Dept: CT IMAGING | Facility: CLINIC | Age: 85
End: 2020-06-16
Payer: MEDICARE

## 2020-06-16 ENCOUNTER — OUTPATIENT (OUTPATIENT)
Dept: OUTPATIENT SERVICES | Facility: HOSPITAL | Age: 85
LOS: 1 days | End: 2020-06-16
Payer: MEDICARE

## 2020-06-16 DIAGNOSIS — Z98.89 OTHER SPECIFIED POSTPROCEDURAL STATES: Chronic | ICD-10-CM

## 2020-06-16 DIAGNOSIS — C34.90 MALIGNANT NEOPLASM OF UNSPECIFIED PART OF UNSPECIFIED BRONCHUS OR LUNG: ICD-10-CM

## 2020-06-16 DIAGNOSIS — H26.9 UNSPECIFIED CATARACT: Chronic | ICD-10-CM

## 2020-06-16 PROCEDURE — 71250 CT THORAX DX C-: CPT

## 2020-06-16 PROCEDURE — 71250 CT THORAX DX C-: CPT | Mod: 26

## 2020-06-17 ENCOUNTER — APPOINTMENT (OUTPATIENT)
Dept: PULMONOLOGY | Facility: CLINIC | Age: 85
End: 2020-06-17
Payer: MEDICARE

## 2020-06-17 VITALS
SYSTOLIC BLOOD PRESSURE: 130 MMHG | OXYGEN SATURATION: 96 % | DIASTOLIC BLOOD PRESSURE: 81 MMHG | HEART RATE: 90 BPM | BODY MASS INDEX: 27.23 KG/M2 | HEIGHT: 62 IN | WEIGHT: 148 LBS

## 2020-06-17 PROCEDURE — 99213 OFFICE O/P EST LOW 20 MIN: CPT

## 2020-06-17 NOTE — REASON FOR VISIT
[Abnormal CXR/ Chest CT] : an abnormal CXR/ chest CT [Follow-Up] : a follow-up visit [Asthma] : asthma [Cough] : cough

## 2020-06-17 NOTE — PHYSICAL EXAM
[No Acute Distress] : no acute distress [Normal Oropharynx] : normal oropharynx [Normal S1, S2] : normal s1, s2 [Normal Rate/Rhythm] : normal rate/rhythm [Normal Appearance] : normal appearance [No Neck Mass] : no neck mass [No Murmurs] : no murmurs [No Resp Distress] : no resp distress [Clear to Auscultation Bilaterally] : clear to auscultation bilaterally [Normal Gait] : normal gait [No Abnormalities] : no abnormalities [Benign] : benign [No Clubbing] : no clubbing [No Cyanosis] : no cyanosis [No Edema] : no edema [No Focal Deficits] : no focal deficits [Normal Color/ Pigmentation] : normal color/ pigmentation [FROM] : FROM [Normal Affect] : normal affect [Oriented x3] : oriented x3

## 2020-06-17 NOTE — HISTORY OF PRESENT ILLNESS
[Never] : never [TextBox_4] : Follow-up for asthma chronic cough and abnormal chest CT.  History of pulmonary nodules.  No change in symptoms reports chronic cough.  Reports compliance with inhalers and medications.  Does not wish additional medications.

## 2020-06-24 ENCOUNTER — APPOINTMENT (OUTPATIENT)
Dept: UROGYNECOLOGY | Facility: CLINIC | Age: 85
End: 2020-06-24
Payer: MEDICARE

## 2020-06-24 PROCEDURE — 99213 OFFICE O/P EST LOW 20 MIN: CPT

## 2020-06-25 NOTE — PHYSICAL EXAM
[Well Nourished] : ~L well nourished [No Acute Distress] : in no acute distress [Well developed] : well developed [Good Hygeine] : demonstrates good hygeine [Oriented x3] : oriented to person, place, and time [Normal Mood/Affect] : mood and affect are normal [Normal Memory] : ~T memory was ~L unimpaired [Warm and Dry] : was warm and dry to touch [Normal Gait] : gait was normal [Labia Majora] : were normal [Labia Minora] : were normal [No Bleeding] : there was no active vaginal bleeding [Normal Appearance] : general appearance was normal [Atrophy] : atrophy [Normal] : normal [Tenderness] : ~T no ~M abdominal tenderness observed [Anxiety] : patient is not anxious [Distended] : not distended

## 2020-06-25 NOTE — PROCEDURE
[Good Fit] : fits well [Pessary Washed] : washed [H2O] : H2O [None] : no bleeding [Erythema] : no erythema [Refit] : refit is not needed [Erosion] : no evidence of erosion [Discharge] : no vaginal discharge [Infection] : no evidence of infection [FreeTextEntry3] : vaginal estrogen [FreeTextEntry1] : GH 2 1/2 LS [FreeTextEntry8] : routine dao-care

## 2020-07-07 ENCOUNTER — APPOINTMENT (OUTPATIENT)
Dept: THORACIC SURGERY | Facility: CLINIC | Age: 85
End: 2020-07-07
Payer: MEDICARE

## 2020-07-07 PROCEDURE — 99214 OFFICE O/P EST MOD 30 MIN: CPT

## 2020-07-07 NOTE — REASON FOR VISIT
[Verbal consent obtained from patient] : the patient, [unfilled] [Follow-Up: _____] : a [unfilled] follow-up visit [FreeTextEntry4] : Jenna Farris, NP

## 2020-07-07 NOTE — ASSESSMENT
[FreeTextEntry1] : 84 year old female S/P Left VATS, wedge resection on 7/6/15 with intraoperative pathology showing negative for malignancy. Final pathology revealed 2 separate adenocarcinomas. First tumor measured 2.0 x 1.0 cm and was a T1a Nx Mx. Second tumor measured 1.1 x 1.0 cm and was a T1a Nx Mx. On 7/31/2015, patient underwent a redo Left VATS, completion of left upper lobectomy and mediastinal lymph node dissection for 2 separate adenocarcinomas. \par \par PMHx is significant for Breast cancer diagnosed in 1999 S/P Bilateral mastectomy and tamoxifen. \par \par CT chest scan 6/16/2020: Once again, three solid nodules measuring less than 0.4 cm are noted within the right middle lobe and both lower lobes. They're unchanged when compared to previous exam. No pleural effusions are noted. Small to moderate size hiatal hernia is noted. \par \par CXR 12/2/19 The right lung is clear. There is left lung volume loss with postsurgical change including chain \par sutures. No pneumothorax is seen. The subcentimeter right lower lobe and lingular nodules reported on the CT scan are not able to be seen. There is no pleural effusion\par \par I have reviewed the images with the patient and made the following recommendations. I have recommended the patient follow up in one year with a CT scan of the chest.\par \par Written by Jenna Farris NP, acting as a scribe for Thom Aguilar MD.\par The documentation recorded by the scribe accurately reflects the service I personally performed and the decisions made by me. Thom Aguilar MD\par \par  \par

## 2020-07-07 NOTE — HISTORY OF PRESENT ILLNESS
[FreeTextEntry1] : 84 year old female S/P Left VATS, wedge resection on 7/6/15 with intraoperative pathology showing negative for malignancy. Final pathology revealed 2 separate adenocarcinomas. First tumor measured 2.0 x 1.0 cm and was a T1a Nx Mx. Second tumor measured 1.1 x 1.0 cm and was a T1a Nx Mx. On 7/31/2015, patient underwent a redo Left VATS, completion of left upper lobectomy and mediastinal lymph node dissection for 2 separate adenocarcinomas. \par \par PMHx is significant for Breast cancer diagnosed in 1999 S/P Bilateral mastectomy and tamoxifen. \par \par CT chest scan 6/16/2020: Once again, three solid nodules measuring less than 0.4 cm are noted within the right middle lobe and both lower lobes. They're unchanged when compared to previous exam. No pleural effusions are noted. Small to moderate size hiatal hernia is noted. \par \par CXR 12/2/19 The right lung is clear. There is left lung volume loss with postsurgical change including chain \par sutures. No pneumothorax is seen. The subcentimeter right lower lobe and lingular nodules reported on the CT scan are not able to be seen. There is no pleural effusion. \par \par The patient denies shortness of breath, fever, chills, dysphagia or hemoptysis. \par

## 2020-09-01 ENCOUNTER — RX RENEWAL (OUTPATIENT)
Age: 85
End: 2020-09-01

## 2020-09-26 ENCOUNTER — RX RENEWAL (OUTPATIENT)
Age: 85
End: 2020-09-26

## 2020-10-08 ENCOUNTER — APPOINTMENT (OUTPATIENT)
Dept: DISASTER EMERGENCY | Facility: CLINIC | Age: 85
End: 2020-10-08

## 2020-10-08 DIAGNOSIS — Z01.818 ENCOUNTER FOR OTHER PREPROCEDURAL EXAMINATION: ICD-10-CM

## 2020-10-09 LAB — SARS-COV-2 N GENE NPH QL NAA+PROBE: NOT DETECTED

## 2020-10-11 ENCOUNTER — RX RENEWAL (OUTPATIENT)
Age: 85
End: 2020-10-11

## 2020-10-14 ENCOUNTER — APPOINTMENT (OUTPATIENT)
Dept: PULMONOLOGY | Facility: CLINIC | Age: 85
End: 2020-10-14
Payer: MEDICARE

## 2020-10-14 VITALS
HEART RATE: 101 BPM | SYSTOLIC BLOOD PRESSURE: 126 MMHG | OXYGEN SATURATION: 95 % | DIASTOLIC BLOOD PRESSURE: 82 MMHG | RESPIRATION RATE: 16 BRPM

## 2020-10-14 PROCEDURE — 94726 PLETHYSMOGRAPHY LUNG VOLUMES: CPT

## 2020-10-14 PROCEDURE — 90662 IIV NO PRSV INCREASED AG IM: CPT

## 2020-10-14 PROCEDURE — G0008: CPT

## 2020-10-14 PROCEDURE — 95012 NITRIC OXIDE EXP GAS DETER: CPT

## 2020-10-14 PROCEDURE — 94060 EVALUATION OF WHEEZING: CPT

## 2020-10-14 PROCEDURE — 99214 OFFICE O/P EST MOD 30 MIN: CPT | Mod: 25

## 2020-10-14 PROCEDURE — 94750: CPT

## 2020-10-14 PROCEDURE — 94729 DIFFUSING CAPACITY: CPT

## 2020-10-14 NOTE — HISTORY OF PRESENT ILLNESS
[Never] : never [TextBox_4] : Follow-up for asthma and chronic cough.  History of pulmonary nodules history of lung cancer

## 2020-10-14 NOTE — ASSESSMENT
[FreeTextEntry1] : Overall doing well.\par Based on NIOX would increase dose of Qvar\par PFTs do show a low diffusing capacity but there is no clear explanation for this on chest CT.  We will continue to monitor

## 2020-10-14 NOTE — PROCEDURE
[FreeTextEntry1] : PFTs demonstrate normal spirometry and moderate diffusion impairment.\par Increase in NIOX to 38 noted

## 2020-10-14 NOTE — REASON FOR VISIT
[Follow-Up] : a follow-up visit [Abnormal CXR/ Chest CT] : an abnormal CXR/ chest CT [Asthma] : asthma [Cough] : cough

## 2020-10-15 ENCOUNTER — MED ADMIN CHARGE (OUTPATIENT)
Age: 85
End: 2020-10-15

## 2020-10-19 ENCOUNTER — APPOINTMENT (OUTPATIENT)
Dept: UROGYNECOLOGY | Facility: CLINIC | Age: 85
End: 2020-10-19

## 2020-12-07 ENCOUNTER — APPOINTMENT (OUTPATIENT)
Dept: UROGYNECOLOGY | Facility: CLINIC | Age: 85
End: 2020-12-07
Payer: MEDICARE

## 2020-12-07 PROCEDURE — 99213 OFFICE O/P EST LOW 20 MIN: CPT

## 2020-12-09 NOTE — PROCEDURE
[Good Fit] : fits well [Pessary Inserted] : inserted [Pessary Washed] : washed [H2O] : H2O [None] : no bleeding [Medication Review] : Medicaiton Review: Patient verbalizes understanding of risks and benefits [Fluid Management] : Fluid Management: patient verbalizes understanding 6-10 cups per day [Bowel Management] : Bowel Management: patient verbalizes understanding of daily dietary fiber intake [Bladder Training] : Bladder Training: Patient given information with verbal understanding [Refit] : refit is not needed [Erosion] : no evidence of erosion [Erythema] : no erythema [Discharge] : no vaginal discharge [Infection] : no evidence of infection [FreeTextEntry1] : SAVITA WYATT # 2 1/2 [FreeTextEntry4] : Advised avoid constipation/straining w/ daily fiber/fluid intake and stool softeners daily PRN [FreeTextEntry8] : .lioare

## 2020-12-09 NOTE — DISCUSSION/SUMMARY
[FreeTextEntry1] : x12 weeks f/u POP and pessary care or sooner prn\par Advised avoid constipation/straining w/ daily fiber/fluid intake and stool softeners daily PRN\par Con't daily proper pericare\par f/u w/ Pulmonologist for cough/hx of Lung CA\par Instructed pt to call the office if any problems or concerns and she verbalized understanding.\par

## 2020-12-09 NOTE — HISTORY OF PRESENT ILLNESS
[FreeTextEntry1] : Pt w/ known POP supported by  LS # 2 1/2 presents to office today for routine f/u after x4 months.  Pt states pessary fell out x3 days ago while she was on toilet for BM, strained mildly.  Denies any abd/pelvic/vaginal pains, bleeding, or urinary discomforts.  Feels empty after voids.  Denies any constipation.  Pt reports with hx cough s/p Hx Lung CA and describes w/ more coughing episodes recently, taking inhalers w/ some relief.  Will f/u with Pulmonologist regarding cough.

## 2020-12-09 NOTE — PHYSICAL EXAM
[No Acute Distress] : in no acute distress [Well developed] : well developed [Well Nourished] : ~L well nourished [Good Hygeine] : demonstrates good hygeine [Oriented x3] : oriented to person, place, and time [Normal Memory] : ~T memory was ~L unimpaired [Normal Mood/Affect] : mood and affect are normal [Warm and Dry] : was warm and dry to touch [Normal Gait] : gait was normal [Vulvar Atrophy] : vulvar atrophy [Labia Majora] : were normal [Labia Minora] : were normal [Atrophy] : atrophy [Cystocele] : a cystocele [Uterine Prolapse] : uterine prolapse [No Bleeding] : there was no active vaginal bleeding [Anxiety] : patient is not anxious [Tenderness] : ~T no ~M abdominal tenderness observed [Distended] : not distended [de-identified] : no visible prolapse  bulging at introitus

## 2021-01-20 ENCOUNTER — APPOINTMENT (OUTPATIENT)
Dept: PULMONOLOGY | Facility: CLINIC | Age: 86
End: 2021-01-20
Payer: MEDICARE

## 2021-01-20 VITALS
DIASTOLIC BLOOD PRESSURE: 77 MMHG | OXYGEN SATURATION: 96 % | HEIGHT: 62 IN | HEART RATE: 101 BPM | SYSTOLIC BLOOD PRESSURE: 111 MMHG

## 2021-01-20 PROCEDURE — 99072 ADDL SUPL MATRL&STAF TM PHE: CPT

## 2021-01-20 PROCEDURE — 99213 OFFICE O/P EST LOW 20 MIN: CPT

## 2021-01-20 NOTE — HISTORY OF PRESENT ILLNESS
[Never] : never [TextBox_4] : Follow-up for chronic cough asthma pulmonary nodules lung cancer recent diagnosis of essential thrombocytosis.

## 2021-03-03 ENCOUNTER — APPOINTMENT (OUTPATIENT)
Dept: PULMONOLOGY | Facility: CLINIC | Age: 86
End: 2021-03-03
Payer: MEDICARE

## 2021-03-03 VITALS
SYSTOLIC BLOOD PRESSURE: 114 MMHG | HEIGHT: 62 IN | OXYGEN SATURATION: 94 % | HEART RATE: 113 BPM | WEIGHT: 148 LBS | BODY MASS INDEX: 27.23 KG/M2 | DIASTOLIC BLOOD PRESSURE: 76 MMHG

## 2021-03-03 PROCEDURE — 71046 X-RAY EXAM CHEST 2 VIEWS: CPT

## 2021-03-03 PROCEDURE — 99214 OFFICE O/P EST MOD 30 MIN: CPT | Mod: 25

## 2021-03-03 PROCEDURE — 99072 ADDL SUPL MATRL&STAF TM PHE: CPT

## 2021-03-03 RX ORDER — INHALER, ASSIST DEVICES
SPACER (EA) MISCELLANEOUS
Qty: 1 | Refills: 0 | Status: ACTIVE | COMMUNITY
Start: 2021-03-03 | End: 1900-01-01

## 2021-03-03 NOTE — HISTORY OF PRESENT ILLNESS
[TextBox_4] : had reflux episode from late night chocolate snack\par \par choked at night, could not breath, took mucinex, drank fluids\par took proair\par \par Also needs change of inhaler prescription as coverage denying Qvar\par

## 2021-03-03 NOTE — PROCEDURE
[FreeTextEntry1] : Chest x-ray demonstrates leftward shift of mediastinum otherwise no acute findings.

## 2021-03-03 NOTE — ASSESSMENT
[FreeTextEntry1] : Worsening shortness of breath related to episode of nocturnal reflux.  Advised better compliance with diet.  We will give short course of oral steroids.  Change Qvar to Flovent and monitor for hoarseness

## 2021-03-03 NOTE — REVIEW OF SYSTEMS
[Cough] : cough [Wheezing] : wheezing [Negative] : Endocrine [TextBox_113] : Diagnosed with high platelet count

## 2021-03-07 ENCOUNTER — RX RENEWAL (OUTPATIENT)
Age: 86
End: 2021-03-07

## 2021-04-05 ENCOUNTER — APPOINTMENT (OUTPATIENT)
Dept: UROGYNECOLOGY | Facility: CLINIC | Age: 86
End: 2021-04-05

## 2021-04-21 ENCOUNTER — APPOINTMENT (OUTPATIENT)
Dept: PULMONOLOGY | Facility: CLINIC | Age: 86
End: 2021-04-21

## 2021-04-29 ENCOUNTER — RESULT REVIEW (OUTPATIENT)
Age: 86
End: 2021-04-29

## 2021-04-29 ENCOUNTER — OUTPATIENT (OUTPATIENT)
Dept: OUTPATIENT SERVICES | Facility: HOSPITAL | Age: 86
LOS: 1 days | End: 2021-04-29
Payer: MEDICARE

## 2021-04-29 ENCOUNTER — APPOINTMENT (OUTPATIENT)
Dept: RADIOLOGY | Facility: CLINIC | Age: 86
End: 2021-04-29
Payer: MEDICARE

## 2021-04-29 DIAGNOSIS — Z98.89 OTHER SPECIFIED POSTPROCEDURAL STATES: Chronic | ICD-10-CM

## 2021-04-29 DIAGNOSIS — R05 COUGH: ICD-10-CM

## 2021-04-29 DIAGNOSIS — H26.9 UNSPECIFIED CATARACT: Chronic | ICD-10-CM

## 2021-04-29 PROCEDURE — 71046 X-RAY EXAM CHEST 2 VIEWS: CPT | Mod: 26

## 2021-04-29 PROCEDURE — 71046 X-RAY EXAM CHEST 2 VIEWS: CPT

## 2021-05-23 ENCOUNTER — RX RENEWAL (OUTPATIENT)
Age: 86
End: 2021-05-23

## 2021-06-09 ENCOUNTER — APPOINTMENT (OUTPATIENT)
Dept: PULMONOLOGY | Facility: CLINIC | Age: 86
End: 2021-06-09
Payer: MEDICARE

## 2021-06-09 VITALS
HEART RATE: 111 BPM | HEIGHT: 62 IN | WEIGHT: 148 LBS | DIASTOLIC BLOOD PRESSURE: 73 MMHG | BODY MASS INDEX: 27.23 KG/M2 | OXYGEN SATURATION: 96 % | SYSTOLIC BLOOD PRESSURE: 107 MMHG

## 2021-06-09 DIAGNOSIS — U07.1 COVID-19: ICD-10-CM

## 2021-06-09 LAB
BASOPHILS # BLD AUTO: 0.07 K/UL
BASOPHILS NFR BLD AUTO: 0.7 %
COVID-19 NUCLEOCAPSID  GAM ANTIBODY INTERPRETATION: POSITIVE
COVID-19 SPIKE DOMAIN ANTIBODY INTERPRETATION: POSITIVE
EOSINOPHIL # BLD AUTO: 0.14 K/UL
EOSINOPHIL NFR BLD AUTO: 1.4 %
HCT VFR BLD CALC: 41.1 %
HGB BLD-MCNC: 13.2 G/DL
IMM GRANULOCYTES NFR BLD AUTO: 1.4 %
LYMPHOCYTES # BLD AUTO: 2.39 K/UL
LYMPHOCYTES NFR BLD AUTO: 23.8 %
MAN DIFF?: NORMAL
MCHC RBC-ENTMCNC: 30.3 PG
MCHC RBC-ENTMCNC: 32.1 GM/DL
MCV RBC AUTO: 94.5 FL
MONOCYTES # BLD AUTO: 0.88 K/UL
MONOCYTES NFR BLD AUTO: 8.8 %
NEUTROPHILS # BLD AUTO: 6.41 K/UL
NEUTROPHILS NFR BLD AUTO: 63.9 %
PLATELET # BLD AUTO: 710 K/UL
RBC # BLD: 4.35 M/UL
RBC # FLD: 15 %
SARS-COV-2 AB SERPL IA-ACNC: 113 U/ML
SARS-COV-2 AB SERPL QL IA: 64.8 INDEX
WBC # FLD AUTO: 10.03 K/UL

## 2021-06-09 PROCEDURE — 99214 OFFICE O/P EST MOD 30 MIN: CPT | Mod: 25

## 2021-06-09 PROCEDURE — 36415 COLL VENOUS BLD VENIPUNCTURE: CPT

## 2021-06-09 PROCEDURE — 71046 X-RAY EXAM CHEST 2 VIEWS: CPT

## 2021-06-09 NOTE — HISTORY OF PRESENT ILLNESS
[TextBox_4] : Had COVID in march\par did not get infusion\par had a lot of joint pain\par These have resolved.\par She is currently on Flovent because of an insurance problem with Qvar.  She developed significant hoarseness with Flovent and I advised her changing back to the Qvar\par

## 2021-06-09 NOTE — ASSESSMENT
[FreeTextEntry1] : Appears to have recovered from Covid without sequela\par Not tolerating Flovent switch back to Qvar.\par \par Needs COVID vaccine but does not want deltoid injection because of lymphedema issues\par will attempt to find site that will give thigh injection

## 2021-06-10 ENCOUNTER — TRANSCRIPTION ENCOUNTER (OUTPATIENT)
Age: 86
End: 2021-06-10

## 2021-06-10 LAB
DEPRECATED D DIMER PPP IA-ACNC: 159 NG/ML DDU
SARS-COV-2 N GENE NPH QL NAA+PROBE: NOT DETECTED

## 2021-06-11 ENCOUNTER — TRANSCRIPTION ENCOUNTER (OUTPATIENT)
Age: 86
End: 2021-06-11

## 2021-06-11 LAB
ALBUMIN SERPL ELPH-MCNC: 4.3 G/DL
ALP BLD-CCNC: 97 U/L
ALT SERPL-CCNC: 12 U/L
ANION GAP SERPL CALC-SCNC: 16 MMOL/L
AST SERPL-CCNC: 20 U/L
BILIRUB SERPL-MCNC: 0.5 MG/DL
BUN SERPL-MCNC: 16 MG/DL
CALCIUM SERPL-MCNC: 10.4 MG/DL
CHLORIDE SERPL-SCNC: 100 MMOL/L
CO2 SERPL-SCNC: 23 MMOL/L
CREAT SERPL-MCNC: 0.71 MG/DL
CRP SERPL-MCNC: 9 MG/L
FERRITIN SERPL-MCNC: 83 NG/ML
GLUCOSE SERPL-MCNC: 111 MG/DL
POTASSIUM SERPL-SCNC: 4.7 MMOL/L
PROT SERPL-MCNC: 6.2 G/DL
SODIUM SERPL-SCNC: 139 MMOL/L

## 2021-06-14 ENCOUNTER — APPOINTMENT (OUTPATIENT)
Dept: PULMONOLOGY | Facility: CLINIC | Age: 86
End: 2021-06-14
Payer: MEDICARE

## 2021-06-14 ENCOUNTER — TRANSCRIPTION ENCOUNTER (OUTPATIENT)
Age: 86
End: 2021-06-14

## 2021-06-14 PROCEDURE — 95012 NITRIC OXIDE EXP GAS DETER: CPT

## 2021-06-14 PROCEDURE — 94010 BREATHING CAPACITY TEST: CPT

## 2021-06-14 PROCEDURE — 94729 DIFFUSING CAPACITY: CPT

## 2021-06-14 PROCEDURE — 94726 PLETHYSMOGRAPHY LUNG VOLUMES: CPT

## 2021-06-17 ENCOUNTER — NON-APPOINTMENT (OUTPATIENT)
Age: 86
End: 2021-06-17

## 2021-06-29 ENCOUNTER — APPOINTMENT (OUTPATIENT)
Dept: UROGYNECOLOGY | Facility: CLINIC | Age: 86
End: 2021-06-29
Payer: MEDICARE

## 2021-06-29 PROCEDURE — 99214 OFFICE O/P EST MOD 30 MIN: CPT

## 2021-07-01 NOTE — PHYSICAL EXAM
[No Acute Distress] : in no acute distress [Well developed] : well developed [Well Nourished] : ~L well nourished [Good Hygeine] : demonstrates good hygeine [Oriented x3] : oriented to person, place, and time [Normal Memory] : ~T memory was ~L unimpaired [Normal Mood/Affect] : mood and affect are normal [Warm and Dry] : was warm and dry to touch [Normal Gait] : gait was normal [Vulvar Atrophy] : vulvar atrophy [Labia Majora] : were normal [Labia Minora] : were normal [Atrophy] : atrophy [Cystocele] : a cystocele [Uterine Prolapse] : uterine prolapse [No Bleeding] : there was no active vaginal bleeding [Anxiety] : patient is not anxious [Tenderness] : ~T no ~M abdominal tenderness observed [Distended] : not distended [de-identified] : no visible prolapse  bulging at introitus [FreeTextEntry4] : pessary intact in posterior vault

## 2021-07-01 NOTE — DISCUSSION/SUMMARY
[FreeTextEntry1] : x12 weeks f/u POP and pessary care or sooner prn\par Advised avoid constipation/straining w/ daily fiber/fluid intake and stool softeners daily PRN\par Con't daily proper pericare\par Refill Rx Estrace cream escribed to pharmacy\par Con't Estrace cream PV BIW HS and topical Zinc Oxide ointment as barrier protection frequently daily\par Instructed pt to call the office if any problems or concerns and she verbalized understanding.\par

## 2021-07-01 NOTE — HISTORY OF PRESENT ILLNESS
[FreeTextEntry1] : Pt w/ known POP supported by  LS # 2 1/2 presents to office today for routine f/u after x6 months.  Pt states she had Covid 19 infection in March 2021 and unable to come for 3 month f/u. Pt denies any post CoVid complications. Pt happy with support provided by pessary.  Denies any abd/pelvic/vaginal pains, bleeding, or urinary discomforts.  Feels empty after voids.  Denies any constipation. Pt applying Esttrace cream as instructed and requests refill Rx now.

## 2021-08-25 ENCOUNTER — APPOINTMENT (OUTPATIENT)
Dept: CT IMAGING | Facility: CLINIC | Age: 86
End: 2021-08-25
Payer: MEDICARE

## 2021-08-25 ENCOUNTER — OUTPATIENT (OUTPATIENT)
Dept: OUTPATIENT SERVICES | Facility: HOSPITAL | Age: 86
LOS: 1 days | End: 2021-08-25
Payer: MEDICARE

## 2021-08-25 DIAGNOSIS — C34.90 MALIGNANT NEOPLASM OF UNSPECIFIED PART OF UNSPECIFIED BRONCHUS OR LUNG: ICD-10-CM

## 2021-08-25 DIAGNOSIS — R91.8 OTHER NONSPECIFIC ABNORMAL FINDING OF LUNG FIELD: ICD-10-CM

## 2021-08-25 DIAGNOSIS — Z98.89 OTHER SPECIFIED POSTPROCEDURAL STATES: Chronic | ICD-10-CM

## 2021-08-25 DIAGNOSIS — H26.9 UNSPECIFIED CATARACT: Chronic | ICD-10-CM

## 2021-08-25 PROCEDURE — 71250 CT THORAX DX C-: CPT

## 2021-08-25 PROCEDURE — 71250 CT THORAX DX C-: CPT | Mod: 26

## 2021-08-26 ENCOUNTER — RX RENEWAL (OUTPATIENT)
Age: 86
End: 2021-08-26

## 2021-08-26 ENCOUNTER — TRANSCRIPTION ENCOUNTER (OUTPATIENT)
Age: 86
End: 2021-08-26

## 2021-08-29 ENCOUNTER — RX RENEWAL (OUTPATIENT)
Age: 86
End: 2021-08-29

## 2021-08-31 ENCOUNTER — APPOINTMENT (OUTPATIENT)
Dept: THORACIC SURGERY | Facility: CLINIC | Age: 86
End: 2021-08-31

## 2021-09-01 ENCOUNTER — APPOINTMENT (OUTPATIENT)
Dept: PULMONOLOGY | Facility: CLINIC | Age: 86
End: 2021-09-01
Payer: MEDICARE

## 2021-09-01 VITALS
WEIGHT: 148 LBS | HEART RATE: 96 BPM | OXYGEN SATURATION: 98 % | DIASTOLIC BLOOD PRESSURE: 79 MMHG | SYSTOLIC BLOOD PRESSURE: 124 MMHG | HEIGHT: 62 IN | BODY MASS INDEX: 27.23 KG/M2

## 2021-09-01 DIAGNOSIS — J31.0 CHRONIC RHINITIS: ICD-10-CM

## 2021-09-01 PROCEDURE — 99214 OFFICE O/P EST MOD 30 MIN: CPT

## 2021-09-01 RX ORDER — METHYLPREDNISOLONE 4 MG/1
4 TABLET ORAL
Qty: 1 | Refills: 0 | Status: DISCONTINUED | COMMUNITY
Start: 2021-03-03 | End: 2021-09-01

## 2021-09-01 RX ORDER — FLUTICASONE PROPIONATE 220 UG/1
220 AEROSOL, METERED RESPIRATORY (INHALATION) TWICE DAILY
Qty: 1 | Refills: 5 | Status: DISCONTINUED | COMMUNITY
Start: 2021-03-03 | End: 2021-09-01

## 2021-09-01 NOTE — HISTORY OF PRESENT ILLNESS
[TextBox_4] : Follow-up for asthma\par Overall feels well\par No shortness of breath some hoarseness.\par Questing refill of cough medicine which he uses rarely

## 2021-09-14 ENCOUNTER — APPOINTMENT (OUTPATIENT)
Dept: THORACIC SURGERY | Facility: CLINIC | Age: 86
End: 2021-09-14
Payer: MEDICARE

## 2021-09-14 PROCEDURE — 99443: CPT

## 2021-09-14 NOTE — CONSULT LETTER
[Dear  ___] : Dear  [unfilled], [Courtesy Letter:] : I had the pleasure of seeing your patient, [unfilled], in my office today. [Please see my note below.] : Please see my note below. [Sincerely,] : Sincerely, [FreeTextEntry2] : Dr. Koko Peck (PCP)\par Dr. Waqar Sagastume (Onc)\par Dr. Fredrick Salinas (GI)\par Dr. Morales (Pulm) [FreeTextEntry3] : Thom Aguilar MD, FACS \par Chief, Division of Thoracic Surgery \par Director, Minimally Invasive Thoracic Surgery \par Department of Cardiovascular and Thoracic Surgery \par [facility] \par , Cardiovascular and Thoracic Surgery\par \par \par

## 2021-09-14 NOTE — ASSESSMENT
[FreeTextEntry1] : 85 year old female S/P Left VATS, wedge resection on 7/6/15 with intraoperative pathology showing negative for malignancy. Final pathology revealed 2 separate adenocarcinomas. First tumor measured 2.0 x 1.0 cm and was a T1a Nx Mx. Second tumor measured 1.1 x 1.0 cm and was a T1a Nx Mx. On 7/31/2015, patient underwent a redo Left VATS, completion of left upper lobectomy and mediastinal lymph node dissection for 2 separate adenocarcinomas. \par \par I have independently reviewed patient's CT on 08/25/2021 and have indicated my interpretations below:\par - CT chest reviewed and explained to patient, I recommended patient to return to office in 12 months with CT Chest without contrast. \par \par I have spent 25 minutes on the phone discussing above result and plan of care with patient.\par \par I personally performed the services described in the documentation, reviewed the documentation recorded by the scribe in my presence and it accurately and completely records my words and actions.\par \par JUAN JOSE, Kathy Frank NP, am scribing for and the presence of JESS White, the following sections HISTORY OF PRESENT ILLNESS, PAST MEDICAL/FAMILY/SOCIAL HISTORY; REVIEW OF SYSTEMS; VITAL SIGNS; PHYSICAL EXAM; DISPOSITION.

## 2021-09-14 NOTE — HISTORY OF PRESENT ILLNESS
[Medical Office: ([facility])___] : at the medical office located in  [Verbal consent obtained from patient] : the patient, [unfilled] [Home] : at home, [unfilled] , at the time of the visit. [FreeTextEntry1] : 85 year old female S/P Left VATS, wedge resection on 7/6/15 with intraoperative pathology showing negative for malignancy. Final pathology revealed 2 separate adenocarcinomas. First tumor measured 2.0 x 1.0 cm and was a T1a Nx Mx. Second tumor measured 1.1 x 1.0 cm and was a T1a Nx Mx. On 7/31/2015, patient underwent a redo Left VATS, completion of left upper lobectomy and mediastinal lymph node dissection for 2 separate adenocarcinomas. \par \par PMHx is significant for Breast cancer diagnosed in 1999 S/P Bilateral mastectomy and tamoxifen. \par \par CT chest on  6/16/2020: Once again, three solid nodules measuring less than 0.4 cm are noted within the right middle lobe and both lower lobes. They're unchanged when compared to previous exam. No pleural effusions are noted. Small to moderate size hiatal hernia is noted. \par \par CXR 12/2/19 The right lung is clear. There is left lung volume loss with postsurgical change including chain \par sutures. No pneumothorax is seen. The subcentimeter right lower lobe and lingular nodules reported on the CT scan are not able to be seen. There is no pleural effusion. \par \par CT chest on 8/25/21:\par - Small lymph node in the left axillary region is unchanged when compared to previous exam.\par - Stable, post op changes. \par - Few less than 0.4 cm nodules in the right middle and both lower lobes are noted. Unchanged compared to previous exam. \par \par Patient presents for follow up via telephonic visit.

## 2021-09-22 ENCOUNTER — APPOINTMENT (OUTPATIENT)
Dept: PULMONOLOGY | Facility: CLINIC | Age: 86
End: 2021-09-22

## 2021-10-13 ENCOUNTER — APPOINTMENT (OUTPATIENT)
Dept: UROGYNECOLOGY | Facility: CLINIC | Age: 86
End: 2021-10-13
Payer: MEDICARE

## 2021-10-13 VITALS
WEIGHT: 143 LBS | HEIGHT: 62 IN | BODY MASS INDEX: 26.31 KG/M2 | OXYGEN SATURATION: 97 % | TEMPERATURE: 96.6 F | DIASTOLIC BLOOD PRESSURE: 68 MMHG | SYSTOLIC BLOOD PRESSURE: 124 MMHG | HEART RATE: 103 BPM

## 2021-10-13 DIAGNOSIS — Z46.89 ENCOUNTER FOR FITTING AND ADJUSTMENT OF OTHER SPECIFIED DEVICES: ICD-10-CM

## 2021-10-13 LAB
BILIRUB UR QL STRIP: NEGATIVE
CLARITY UR: NORMAL
COLLECTION METHOD: NORMAL
GLUCOSE UR-MCNC: NEGATIVE
HCG UR QL: 0.2 EU/DL
HGB UR QL STRIP.AUTO: NORMAL
KETONES UR-MCNC: NEGATIVE
LEUKOCYTE ESTERASE UR QL STRIP: NORMAL
NITRITE UR QL STRIP: NEGATIVE
PH UR STRIP: 5.5
PROT UR STRIP-MCNC: NEGATIVE
SP GR UR STRIP: 1.01

## 2021-10-13 PROCEDURE — 99213 OFFICE O/P EST LOW 20 MIN: CPT | Mod: 25

## 2021-10-13 PROCEDURE — 81003 URINALYSIS AUTO W/O SCOPE: CPT | Mod: QW

## 2021-10-13 PROCEDURE — 51701 INSERT BLADDER CATHETER: CPT

## 2021-10-14 ENCOUNTER — NON-APPOINTMENT (OUTPATIENT)
Age: 86
End: 2021-10-14

## 2021-10-16 LAB
APPEARANCE: ABNORMAL
BACTERIA: NEGATIVE
BILIRUBIN URINE: NEGATIVE
BLOOD URINE: ABNORMAL
COLOR: YELLOW
GLUCOSE QUALITATIVE U: NEGATIVE
HYALINE CASTS: 0 /LPF
KETONES URINE: NEGATIVE
LEUKOCYTE ESTERASE URINE: ABNORMAL
MICROSCOPIC-UA: NORMAL
NITRITE URINE: NEGATIVE
PH URINE: 6.5
PROTEIN URINE: NORMAL
RED BLOOD CELLS URINE: 4 /HPF
SPECIFIC GRAVITY URINE: 1.02
SQUAMOUS EPITHELIAL CELLS: 1 /HPF
UROBILINOGEN URINE: NORMAL
WHITE BLOOD CELLS URINE: 406 /HPF

## 2021-10-18 ENCOUNTER — APPOINTMENT (OUTPATIENT)
Dept: UROGYNECOLOGY | Facility: CLINIC | Age: 86
End: 2021-10-18

## 2021-10-27 ENCOUNTER — APPOINTMENT (OUTPATIENT)
Dept: UROGYNECOLOGY | Facility: CLINIC | Age: 86
End: 2021-10-27
Payer: MEDICARE

## 2021-10-27 ENCOUNTER — RESULT CHARGE (OUTPATIENT)
Age: 86
End: 2021-10-27

## 2021-10-27 LAB
BILIRUB UR QL STRIP: NEGATIVE
CLARITY UR: CLEAR
COLLECTION METHOD: NORMAL
GLUCOSE UR-MCNC: NEGATIVE
HCG UR QL: 0.2 EU/DL
HGB UR QL STRIP.AUTO: NEGATIVE
KETONES UR-MCNC: NEGATIVE
LEUKOCYTE ESTERASE UR QL STRIP: NEGATIVE
NITRITE UR QL STRIP: NEGATIVE
PH UR STRIP: 7
PROT UR STRIP-MCNC: NEGATIVE

## 2021-10-27 PROCEDURE — 99213 OFFICE O/P EST LOW 20 MIN: CPT | Mod: 25

## 2021-10-27 PROCEDURE — 51701 INSERT BLADDER CATHETER: CPT

## 2021-10-27 PROCEDURE — 81003 URINALYSIS AUTO W/O SCOPE: CPT | Mod: QW

## 2021-10-28 ENCOUNTER — RX RENEWAL (OUTPATIENT)
Age: 86
End: 2021-10-28

## 2021-11-04 ENCOUNTER — EMERGENCY (EMERGENCY)
Facility: HOSPITAL | Age: 86
LOS: 1 days | Discharge: HOME | End: 2021-11-04
Attending: EMERGENCY MEDICINE | Admitting: EMERGENCY MEDICINE
Payer: MEDICARE

## 2021-11-04 VITALS — TEMPERATURE: 100 F | HEART RATE: 105 BPM

## 2021-11-04 VITALS
SYSTOLIC BLOOD PRESSURE: 143 MMHG | DIASTOLIC BLOOD PRESSURE: 89 MMHG | WEIGHT: 141.1 LBS | OXYGEN SATURATION: 98 % | RESPIRATION RATE: 18 BRPM | HEART RATE: 125 BPM | HEIGHT: 62 IN | TEMPERATURE: 99 F

## 2021-11-04 DIAGNOSIS — Z98.89 OTHER SPECIFIED POSTPROCEDURAL STATES: Chronic | ICD-10-CM

## 2021-11-04 DIAGNOSIS — H26.9 UNSPECIFIED CATARACT: Chronic | ICD-10-CM

## 2021-11-04 LAB
ALBUMIN SERPL ELPH-MCNC: 3.5 G/DL — SIGNIFICANT CHANGE UP (ref 3.3–5)
ALP SERPL-CCNC: 90 U/L — SIGNIFICANT CHANGE UP (ref 40–120)
ALT FLD-CCNC: 28 U/L — SIGNIFICANT CHANGE UP (ref 12–78)
ANION GAP SERPL CALC-SCNC: 8 MMOL/L — SIGNIFICANT CHANGE UP (ref 5–17)
APPEARANCE UR: CLEAR — SIGNIFICANT CHANGE UP
AST SERPL-CCNC: 20 U/L — SIGNIFICANT CHANGE UP (ref 15–37)
BASOPHILS # BLD AUTO: 0.06 K/UL — SIGNIFICANT CHANGE UP (ref 0–0.2)
BASOPHILS NFR BLD AUTO: 0.5 % — SIGNIFICANT CHANGE UP (ref 0–2)
BILIRUB SERPL-MCNC: 0.5 MG/DL — SIGNIFICANT CHANGE UP (ref 0.2–1.2)
BILIRUB UR-MCNC: NEGATIVE — SIGNIFICANT CHANGE UP
BUN SERPL-MCNC: 17 MG/DL — SIGNIFICANT CHANGE UP (ref 7–23)
CALCIUM SERPL-MCNC: 9.7 MG/DL — SIGNIFICANT CHANGE UP (ref 8.5–10.1)
CHLORIDE SERPL-SCNC: 96 MMOL/L — SIGNIFICANT CHANGE UP (ref 96–108)
CO2 SERPL-SCNC: 29 MMOL/L — SIGNIFICANT CHANGE UP (ref 22–31)
COLOR SPEC: YELLOW — SIGNIFICANT CHANGE UP
CREAT SERPL-MCNC: 0.82 MG/DL — SIGNIFICANT CHANGE UP (ref 0.5–1.3)
DIFF PNL FLD: ABNORMAL
EOSINOPHIL # BLD AUTO: 0.11 K/UL — SIGNIFICANT CHANGE UP (ref 0–0.5)
EOSINOPHIL NFR BLD AUTO: 0.9 % — SIGNIFICANT CHANGE UP (ref 0–6)
GLUCOSE SERPL-MCNC: 112 MG/DL — HIGH (ref 70–99)
GLUCOSE UR QL: NEGATIVE — SIGNIFICANT CHANGE UP
HCT VFR BLD CALC: 39.3 % — SIGNIFICANT CHANGE UP (ref 34.5–45)
HGB BLD-MCNC: 13.1 G/DL — SIGNIFICANT CHANGE UP (ref 11.5–15.5)
IMM GRANULOCYTES NFR BLD AUTO: 1.2 % — SIGNIFICANT CHANGE UP (ref 0–1.5)
KETONES UR-MCNC: NEGATIVE — SIGNIFICANT CHANGE UP
LEUKOCYTE ESTERASE UR-ACNC: ABNORMAL
LYMPHOCYTES # BLD AUTO: 1.6 K/UL — SIGNIFICANT CHANGE UP (ref 1–3.3)
LYMPHOCYTES # BLD AUTO: 12.8 % — LOW (ref 13–44)
MCHC RBC-ENTMCNC: 29 PG — SIGNIFICANT CHANGE UP (ref 27–34)
MCHC RBC-ENTMCNC: 33.3 GM/DL — SIGNIFICANT CHANGE UP (ref 32–36)
MCV RBC AUTO: 86.9 FL — SIGNIFICANT CHANGE UP (ref 80–100)
MONOCYTES # BLD AUTO: 0.88 K/UL — SIGNIFICANT CHANGE UP (ref 0–0.9)
MONOCYTES NFR BLD AUTO: 7.1 % — SIGNIFICANT CHANGE UP (ref 2–14)
NEUTROPHILS # BLD AUTO: 9.68 K/UL — HIGH (ref 1.8–7.4)
NEUTROPHILS NFR BLD AUTO: 77.5 % — HIGH (ref 43–77)
NITRITE UR-MCNC: NEGATIVE — SIGNIFICANT CHANGE UP
NRBC # BLD: 0 /100 WBCS — SIGNIFICANT CHANGE UP (ref 0–0)
NT-PROBNP SERPL-SCNC: 142 PG/ML — SIGNIFICANT CHANGE UP (ref 0–450)
PH UR: 8 — SIGNIFICANT CHANGE UP (ref 5–8)
PLATELET # BLD AUTO: 645 K/UL — HIGH (ref 150–400)
POTASSIUM SERPL-MCNC: 3.5 MMOL/L — SIGNIFICANT CHANGE UP (ref 3.5–5.3)
POTASSIUM SERPL-SCNC: 3.5 MMOL/L — SIGNIFICANT CHANGE UP (ref 3.5–5.3)
PROT SERPL-MCNC: 7.4 G/DL — SIGNIFICANT CHANGE UP (ref 6–8.3)
PROT UR-MCNC: NEGATIVE — SIGNIFICANT CHANGE UP
RBC # BLD: 4.52 M/UL — SIGNIFICANT CHANGE UP (ref 3.8–5.2)
RBC # FLD: 15.4 % — HIGH (ref 10.3–14.5)
SODIUM SERPL-SCNC: 133 MMOL/L — LOW (ref 135–145)
SP GR SPEC: 1.01 — SIGNIFICANT CHANGE UP (ref 1.01–1.02)
TROPONIN I, HIGH SENSITIVITY RESULT: 9.5 NG/L — SIGNIFICANT CHANGE UP
TSH SERPL-MCNC: 1.68 UIU/ML — SIGNIFICANT CHANGE UP (ref 0.36–3.74)
UROBILINOGEN FLD QL: NEGATIVE — SIGNIFICANT CHANGE UP
WBC # BLD: 12.48 K/UL — HIGH (ref 3.8–10.5)
WBC # FLD AUTO: 12.48 K/UL — HIGH (ref 3.8–10.5)

## 2021-11-04 PROCEDURE — 71275 CT ANGIOGRAPHY CHEST: CPT | Mod: 26,MA

## 2021-11-04 PROCEDURE — 71275 CT ANGIOGRAPHY CHEST: CPT | Mod: MA

## 2021-11-04 PROCEDURE — 99284 EMERGENCY DEPT VISIT MOD MDM: CPT | Mod: 25

## 2021-11-04 PROCEDURE — 99285 EMERGENCY DEPT VISIT HI MDM: CPT

## 2021-11-04 PROCEDURE — 84484 ASSAY OF TROPONIN QUANT: CPT

## 2021-11-04 PROCEDURE — 85730 THROMBOPLASTIN TIME PARTIAL: CPT

## 2021-11-04 PROCEDURE — 81001 URINALYSIS AUTO W/SCOPE: CPT

## 2021-11-04 PROCEDURE — 96361 HYDRATE IV INFUSION ADD-ON: CPT

## 2021-11-04 PROCEDURE — 93005 ELECTROCARDIOGRAM TRACING: CPT

## 2021-11-04 PROCEDURE — 96360 HYDRATION IV INFUSION INIT: CPT | Mod: XU

## 2021-11-04 PROCEDURE — 71045 X-RAY EXAM CHEST 1 VIEW: CPT

## 2021-11-04 PROCEDURE — 84443 ASSAY THYROID STIM HORMONE: CPT

## 2021-11-04 PROCEDURE — 71045 X-RAY EXAM CHEST 1 VIEW: CPT | Mod: 26

## 2021-11-04 PROCEDURE — 85610 PROTHROMBIN TIME: CPT

## 2021-11-04 PROCEDURE — 93010 ELECTROCARDIOGRAM REPORT: CPT

## 2021-11-04 PROCEDURE — 83880 ASSAY OF NATRIURETIC PEPTIDE: CPT

## 2021-11-04 PROCEDURE — 85379 FIBRIN DEGRADATION QUANT: CPT

## 2021-11-04 PROCEDURE — 85025 COMPLETE CBC W/AUTO DIFF WBC: CPT

## 2021-11-04 PROCEDURE — 36415 COLL VENOUS BLD VENIPUNCTURE: CPT

## 2021-11-04 PROCEDURE — 80053 COMPREHEN METABOLIC PANEL: CPT

## 2021-11-04 RX ORDER — SODIUM CHLORIDE 9 MG/ML
1000 INJECTION INTRAMUSCULAR; INTRAVENOUS; SUBCUTANEOUS ONCE
Refills: 0 | Status: COMPLETED | OUTPATIENT
Start: 2021-11-04 | End: 2021-11-04

## 2021-11-04 RX ADMIN — SODIUM CHLORIDE 1000 MILLILITER(S): 9 INJECTION INTRAMUSCULAR; INTRAVENOUS; SUBCUTANEOUS at 20:06

## 2021-11-04 RX ADMIN — SODIUM CHLORIDE 1000 MILLILITER(S): 9 INJECTION INTRAMUSCULAR; INTRAVENOUS; SUBCUTANEOUS at 13:46

## 2021-11-04 RX ADMIN — SODIUM CHLORIDE 1000 MILLILITER(S): 9 INJECTION INTRAMUSCULAR; INTRAVENOUS; SUBCUTANEOUS at 19:05

## 2021-11-04 RX ADMIN — SODIUM CHLORIDE 1000 MILLILITER(S): 9 INJECTION INTRAMUSCULAR; INTRAVENOUS; SUBCUTANEOUS at 14:47

## 2021-11-04 NOTE — ED PROVIDER NOTE - ATTENDING CONTRIBUTION TO CARE
I have personally performed a face to face diagnostic evaluation on this patient.  I have reviewed the PA note and agree with the history, exam, and plan of care, except as noted.  History and Exam by me shows 87 y/o female with PMHX HTN, Lung Ca with resection, Breast CA, bladder prolapse presents today due to palpitations last night. pt reports her heart rate was 123 last night. No new sob, or cough.  No cp.  Patient is NAD.  A n O x 3. Head NC/AT. Lungs cta bl. Heart s1,s2, tahcy 109 , no murmurs. Abd-soft, nt, no guarding, no rebound, no distension, no cva tenderness. Ext- FROM actively,  ambulating s any difficulty.

## 2021-11-04 NOTE — ED PROVIDER NOTE - CLINICAL SUMMARY MEDICAL DECISION MAKING FREE TEXT BOX
presents today due to palpitations last night. pt reports her heart rate was 123 last night. pt reports she had an similar episode prior in which her HR was 111 and it resolved on its own. Pt reports she has been generally weak for a month in which she believed was due to UTI but was treated with abx. pt reports mild SOB and chronic cough. plan includes labs, EKG/troponin r/o CAD, d-dimer r/o PE, CXR, cardio consult.

## 2021-11-04 NOTE — ED ADULT NURSE REASSESSMENT NOTE - NS ED NURSE REASSESS COMMENT FT1
pt was unable to complete CT due to IV infiltrating.  pt at this time in not willing to have anyone attempt an IV.  Dr. Coronel made aware

## 2021-11-04 NOTE — ED PROVIDER NOTE - PHYSICAL EXAMINATION
Constitutional: Awake, Alert, non-toxic. NAD. Well appearing, well nourished.   HEAD: Normocephalic, atraumatic.   EYES: EOM intact, conjunctiva and sclera are clear bilaterally.   ENT: No rhinorrhea, patent, mucous membranes pink/moist, no drooling or stridor.   NECK: Supple, non-tender  CARDIOVASCULAR: Normal S1, S2; regular rhythm. (+) tachycardia   RESPIRATORY: Normal respiratory effort; breath sounds CTAB, no wheezes, rhonchi, or rales. Speaking in full sentences. No accessory muscle use.   ABDOMEN: Soft; non-tender, non-distended.   EXTREMITIES: Full passive and active ROM in all extremities; non-tender to palpation; distal pulses palpable and symmetric  SKIN: Warm, dry; good skin turgor, no apparent lesions or rashes, no ecchymosis, brisk capillary refill.  NEURO: A&O x3. Sensory and motor functions are grossly intact. Speech is normal. Appearance and judgement seem appropriate for gender and age.

## 2021-11-04 NOTE — ED PROVIDER NOTE - NSICDXPASTSURGICALHX_GEN_ALL_CORE_FT
PAST SURGICAL HISTORY:  Cataract 2009 B/l    Deviated nasal septum had surgery in 1994    H/O arthroplasty right knee replacement 2013    H/O arthroscopy of left knee 2013    History of lung surgery Left lng wedge resection   7/6/15    S/P cataract surgery bilateral in 2009    S/P D&C (status post dilation and curettage) Endometrial bx 2012    S/P mastectomy, bilateral in 1998    S/P wrist surgery right in 2012

## 2021-11-04 NOTE — CONSULT NOTE ADULT - SUBJECTIVE AND OBJECTIVE BOX
[facility] - Myra Hernandez, Piyush Fischer, Irma, Salvatore, Tramaine Brown  Office Number: [phone]    Initial Consult Note    CHIEF COMPLAINT: Patient is a 86y old  Female who presents with a chief complaint of     HPI:    PAST MEDICAL & SURGICAL HISTORY:  Hypertension    Asthma    Breast cancer  H/o 10/1998    Vasovagal syndrome    Lymph edema  right - NO IV NO BLOOD DRAW, NO B/P RIGHT ARM    Barretts esophagus    COPD (chronic obstructive pulmonary disease)    Abnormal lung field    Cataract  Bilateral    Thyroid nodule    Lung cancer    S/P mastectomy, bilateral  in 1998    Deviated nasal septum  had surgery in 1994    S/P cataract surgery  bilateral in 2009    S/P wrist surgery  right in 2012    Cataract  2009 B/l    H/O arthroplasty  right knee replacement 2013    H/O arthroscopy of left knee  2013    S/P D&amp;C (status post dilation and curettage)  Endometrial bx 2012    History of lung surgery  Left lng wedge resection   7/6/15      SOCIAL HISTORY:  No tobacco, ethanol, or drug abuse.  FAMILY HISTORY:  Family history of hypertension (Father, Mother)  1 daughter alive with HTN    Family history of diabetes mellitus (Father, Mother)    Family history of colon cancer (Father)    Family history of congenital heart disease (Sibling)    Family history of lung cancer (Child)    Family history of thyroid disorder (Child)  1 daughter      No family history of acute MI or sudden cardiac death.  MEDICATIONS  (STANDING):    MEDICATIONS  (PRN):    Allergies    adhesives (Hives)  Advair Diskus (Rash)  Flovent (Rash)  Percocet 10/325 (Pruritus; Hives)  Pulmicort Flexhaler (Rash)    Intolerances      REVIEW OF SYSTEMS:  CONSTITUTIONAL: No weakness, fevers or chills  EYES/ENT: No visual changes;  No vertigo or throat pain   NECK: No pain or stiffness  RESPIRATORY: No cough, wheezing, hemoptysis; No shortness of breath  CARDIOVASCULAR: No chest pain or palpitations  GASTROINTESTINAL: No abdominal pain. No nausea, vomiting, or hematemesis; No diarrhea or constipation. No melena or hematochezia.  GENITOURINARY: No dysuria, frequency or hematuria  NEUROLOGICAL: No numbness or weakness  SKIN: No itching or rash  All other review of systems is negative unless indicated above  VITAL SIGNS:   Vital Signs Last 24 Hrs  T(C): 37.1 (04 Nov 2021 12:50), Max: 37.1 (04 Nov 2021 12:50)  T(F): 98.8 (04 Nov 2021 12:50), Max: 98.8 (04 Nov 2021 12:50)  HR: 125 (04 Nov 2021 12:50) (125 - 125)  BP: 143/89 (04 Nov 2021 12:50) (143/89 - 143/89)  BP(mean): --  RR: 18 (04 Nov 2021 12:50) (18 - 18)  SpO2: 98% (04 Nov 2021 12:50) (98% - 98%)  I&O's Summary    On Exam:  Constitutional: NAD, alert and oriented x 3  Lungs:  Non-labored, breath sounds are clear bilaterally, No wheezing, rales or rhonchi  Cardiovascular: RRR.  S1 and S2 positive.  No murmurs, rubs, gallops or clicks  Gastrointestinal: Bowel Sounds present, soft, nontender.   Lymph: No peripheral edema. No cervical lymphadenopathy.  Neurological: Alert, no focal deficits  Skin: No rashes or ulcers   Psych:  Mood & affect appropriate.    LABS: All Labs Reviewed:                        13.1   12.48 )-----------( 645      ( 04 Nov 2021 13:49 )             39.3     04 Nov 2021 13:49    133    |  96     |  17     ----------------------------<  112    3.5     |  29     |  0.82     Ca    9.7        04 Nov 2021 13:49    TPro  7.4    /  Alb  3.5    /  TBili  0.5    /  DBili  x      /  AST  20     /  ALT  28     /  AlkPhos  90     04 Nov 2021 13:49    Blood Culture:   11-04 @ 13:49  Pro Bnp 142    RADIOLOGY:    EKG:    Assessment/Plan:      Thania Bowers DNP, NP-C  Cardiology  Spectra #[phone]       [facility] - Myra Hernandez, Piyush Fischer, Irma, Salvatore, Tramaine Brown  Office Number: [phone]    Initial Consult Note:  86 F with no cardiac Hx but with HTN, lung Ca s/p LLL lobectomy, breast Ca presented to the ED c/o palpitations     CHIEF COMPLAINT: Patient is a 86y old  Female who presents with a chief complaint of     HPI: This is an 86 F with no cardiac Hx but with HTN, lung Ca s/p LLL lobectomy, breast Ca presented to the ED c/o palpitations last night lasting for 30mins.  Patient states, she has not been feeling well for few days.  States, she was felt weak and last night, she felt her heart racing.  She checked her pulse Ox which showed her HR at 120's.  Denies fever, chills, SOB, CORDERO, orthopnea, sick contact, recent travel.  Denies N/V or diarrhea. However, admits to having uterine prolapse which predisposes her to UTI.  Had recent treatment for such.  She has chronic LE edema for which she take HCTZ and has been taking.  claims to have diuresed last night more than ever.  Does not see a cardiologist.  Today, she woke up with no palpitations but c/o being very weak.  she was then advised by her PCP to come to the ED.    EKG showed sinus tachycardia , rate of 117.  Tele showed sinus tach as well, rate ranging 110-123.  Patient felt a little better compared to this morning.  PAST MEDICAL & SURGICAL HISTORY:  Hypertension    Asthma    Breast cancer  H/o 10/1998    Vasovagal syndrome    Lymph edema  right - NO IV NO BLOOD DRAW, NO B/P RIGHT ARM    Barretts esophagus    COPD (chronic obstructive pulmonary disease)    Abnormal lung field    Cataract  Bilateral    Thyroid nodule    Lung cancer    S/P mastectomy, bilateral  in 1998    Deviated nasal septum  had surgery in 1994    S/P cataract surgery  bilateral in 2009    S/P wrist surgery  right in 2012    Cataract  2009 B/l    H/O arthroplasty  right knee replacement 2013    H/O arthroscopy of left knee  2013    S/P D&amp;C (status post dilation and curettage)  Endometrial bx 2012    History of lung surgery  Left lng wedge resection   7/6/15      SOCIAL HISTORY:  No tobacco, ethanol, or drug abuse.  FAMILY HISTORY:  Family history of hypertension (Father, Mother)  1 daughter alive with HTN    Family history of diabetes mellitus (Father, Mother)    Family history of colon cancer (Father)    Family history of congenital heart disease (Sibling)    Family history of lung cancer (Child)    Family history of thyroid disorder (Child)  1 daughter      No family history of acute MI or sudden cardiac death.  MEDICATIONS  (STANDING):    MEDICATIONS  (PRN):    Allergies    adhesives (Hives)  Advair Diskus (Rash)  Flovent (Rash)  Percocet 10/325 (Pruritus; Hives)  Pulmicort Flexhaler (Rash)    Intolerances    REVIEW OF SYSTEMS:  CONSTITUTIONAL: +weakness, no fevers or chills  EYES/ENT: No visual changes;  No vertigo or throat pain   NECK: No pain or stiffness  RESPIRATORY: No cough, wheezing, hemoptysis; No shortness of breath  CARDIOVASCULAR: No chest pain +palpitations  GASTROINTESTINAL: No abdominal pain. No nausea, vomiting, or hematemesis; No diarrhea or constipation. No melena or hematochezia.  GENITOURINARY: No dysuria, frequency or hematuria  NEUROLOGICAL: No numbness or weakness  SKIN: No itching or rash  All other review of systems is negative unless indicated above  VITAL SIGNS:   Vital Signs Last 24 Hrs  T(C): 37.1 (04 Nov 2021 12:50), Max: 37.1 (04 Nov 2021 12:50)  T(F): 98.8 (04 Nov 2021 12:50), Max: 98.8 (04 Nov 2021 12:50)  HR: 125 (04 Nov 2021 12:50) (125 - 125)  BP: 143/89 (04 Nov 2021 12:50) (143/89 - 143/89)  BP(mean): --  RR: 18 (04 Nov 2021 12:50) (18 - 18)  SpO2: 98% (04 Nov 2021 12:50) (98% - 98%)  I&O's Summary    On Exam:  Constitutional: NAD, alert and oriented x 3  Lungs:  Non-labored, breath sounds are clear but slightly diminished at left base, No wheezing, rales or rhonchi  Cardiovascular: RRR.  S1 and S2 positive.  No murmurs, rubs, gallops or clicks  Gastrointestinal: Bowel Sounds present, soft, nontender.   Lymph: 1-2+ bipedal edema. No cervical lymphadenopathy.  Neurological: Alert, no focal deficits  Skin: No rashes or ulcers   Psych:  Mood & affect appropriate.    LABS: All Labs Reviewed:                        13.1   12.48 )-----------( 645      ( 04 Nov 2021 13:49 )             39.3     04 Nov 2021 13:49    133    |  96     |  17     ----------------------------<  112    3.5     |  29     |  0.82     Ca    9.7        04 Nov 2021 13:49    TPro  7.4    /  Alb  3.5    /  TBili  0.5    /  DBili  x      /  AST  20     /  ALT  28     /  AlkPhos  90     04 Nov 2021 13:49    Blood Culture:   11-04 @ 13:49  Pro Bnp 142    RADIOLOGY:    EKG: Sinus tach at 117, no ischemic changes

## 2021-11-04 NOTE — ED PROVIDER NOTE - CARE PROVIDER_API CALL
Ashish Solis)  [facility]  [address] [place] [address]  Phone: [phone]  Fax: [phone]  Follow Up Time: 1-3 Days

## 2021-11-04 NOTE — CONSULT NOTE ADULT - ASSESSMENT
Assessment/Plan:  This is an 86 F with no cardiac Hx but with HTN, lung Ca s/p LLL lobectomy, breast Ca presented to the ED c/o palpitations    Sinus Tach/Palpitations/HTN  - EKG showed sinus tach at 117.  Tele showed sinus tach as well at 110's-120's  - No evidence of Afib or any other arrhythmias  - She appears to be intravascularly depleted.  Takes HCTZ 12.5/25 mg am/pm for her BLE edema and had diuresed overnight, more that usual  - She has no baseline TTE.  Would obtain one if admitted  - Would r/o infectious process  - Would r/o PE.  Follow up CTPA  - Monitor electrolytes, replete to keep K>4 and Mag>2  - Would hold HCTZ for now.  Can continue home ARB but at a lower dose  - Monitor volume status  - Recommend compression stockings    - If admitted, will continue to follow  - If discharged, she will have to follow up with a cardiologist    Thania Bowers DNP, NP-C  Cardiology  Spectra #[phone]     Assessment/Plan:  This is an 86 F with no cardiac Hx but with HTN, lung Ca s/p LLL lobectomy, breast Ca presented to the ED c/o palpitations    Sinus Tach/Palpitations/HTN  - EKG showed sinus tach at 117.  Tele showed sinus tach as well at 110's-120's.  Troponin normal  - No evidence of Afib or any other arrhythmias  - She appears to be intravascularly depleted.  Takes HCTZ 12.5/25 mg am/pm for her BLE edema and had diuresed overnight, more that usual  - She has no baseline TTE.  Would obtain one if admitted  - Would r/o infectious process  - Would r/o PE.  Follow up CTPA  - Obtain TSH  - Monitor electrolytes, replete to keep K>4 and Mag>2  - Would hold HCTZ for now.  Can continue home ARB but at a lower dose  - Monitor volume status.  BNP normal  - Recommend compression stockings    - If admitted, will continue to follow  - If discharged, she will have to follow up with a cardiologist    Thania Bowers DNP, NP-C  Cardiology  Spectra #[phone]     Assessment/Plan:  This is an 86 F with no cardiac Hx but with HTN, lung Ca s/p LLL lobectomy, breast Ca presented to the ED c/o palpitations    Sinus Tach/Palpitations/HTN  - EKG showed sinus tach at 117.  Tele showed sinus tach as well at 110's-120's.  Troponin normal  - No evidence of Afib or any other arrhythmias  - She appears to be intravascularly depleted.  Takes HCTZ 12.5/25 mg am/pm for her BLE edema and had diuresed overnight, more that usual  - She has no baseline TTE.  Would obtain one if admitted.  Otherwise she can get it as outpatient  - Would r/o infectious process  - Would r/o PE.  Follow up CTPA  - Obtain TSH  - Monitor electrolytes, replete to keep K>4 and Mag>2  - Would hold HCTZ for now.  Can continue home ARB but at a lower dose  - Monitor volume status.  BNP normal  - Recommend compression stockings    - If admitted, will continue to follow  - If discharged, she will have to follow up with a cardiologist    Thania Bowers DNP, NP-C  Cardiology  Spectra #[phone]

## 2021-11-04 NOTE — ED ADULT NURSE NOTE - NSHOSCREENINGQ1_ED_ALL_ED
0800- Bedside shift change report given to Rajat Lloyd RN (oncoming nurse) by Merline Holms. Pearl CARTY (offgoing nurse).  Report included the following information SBAR.   [id number] hourly rounding done No

## 2021-11-04 NOTE — ED PROVIDER NOTE - PROGRESS NOTE DETAILS
CArdio advised may dc. Advised to have patient follow up acutely in office. Reevaluated patient at bedside. Patient notes improvement of symptoms. Discussed results with the patient and provided copies.  All questions were answered. Discussed the importance of prompt, close medical follow-up. Patient will return with any changes, concerns or persistent/worsening symptoms.  Patient verbalized understanding.

## 2021-11-04 NOTE — ED PROVIDER NOTE - OBJECTIVE STATEMENT
87 y/o female with PMHX HTN, Lung Ca with resection, Breast CA, bladder prolapse presents today due to palpitations last night. pt reports her heart rate was 123 last night. pt reports she had an similar episode prior in which her HR was 111 and it resolved on its own. Pt reports she has been generally weak for a month in which she believed was due to UTI but was treated with abx. pt reports mild SOB and chronic cough. pt denies chest pain, hemoptysis, leg swelling, dysuria, abd pain, vomiting, diarrhea, or any other complaints.

## 2021-11-04 NOTE — ED ADULT NURSE NOTE - OBJECTIVE STATEMENT
pt alert and oriented from home presenting with tachycardia.  pt states that she takes her vitals at home and monitors them and came in when she noticed her HR was elevated

## 2021-11-04 NOTE — ED PROVIDER NOTE - PATIENT PORTAL LINK FT
You can access the FollowMyHealth Patient Portal offered by [facility] by registering at the following website: [email or web address]. By joining [facility]’s FollowMyHealth portal, you will also be able to view your health information using other applications (apps) compatible with our system.

## 2021-11-04 NOTE — CONSULT NOTE ADULT - ATTENDING COMMENTS
patient with reactive sinus tachy.  chest ct pa, rule out PE.  r.o infection.  to follow closely while admitted.

## 2021-11-04 NOTE — ED PROVIDER NOTE - NSICDXPASTMEDICALHX_GEN_ALL_CORE_FT
PAST MEDICAL HISTORY:  Abnormal lung field     Asthma     Barretts esophagus     Breast cancer H/o 10/1998    Cataract Bilateral    COPD (chronic obstructive pulmonary disease)     Hypertension     Lung cancer     Lymph edema right - NO IV NO BLOOD DRAW, NO B/P RIGHT ARM    Thyroid nodule     Vasovagal syndrome

## 2021-11-04 NOTE — ED PROVIDER NOTE - NSFOLLOWUPINSTRUCTIONS_ED_ALL_ED_FT
Avoid exertion or standing up quickly. Drink plenty of fluids. Return for chest pain, shortness of breath, dizziness, fever, etc. Follow up with Cardiology as soon as possible.       Tachycardia     WHAT YOU NEED TO KNOW:    What do I need to know about tachycardia? Tachycardia (fast heart rate) is when your heart rate is 100 beats per minute or more at rest.    Heart Chambers         What causes or increases my risk for tachycardia? It is normal for the heart rate to increase with activity or exercise and then decrease when you stop. A fast heart rate at rest may be caused by any of the following:  •Anxiety, stress, or pain      •Fever      •Physical fatigue or strenuous exercise      •Large amounts of caffeine such as coffee, tea, and energy drinks      •Heavy alcohol use, cigarette smoking, or drugs such as cocaine      •Some medicines, such as inhalers, cold medicines, and hypertension medicines      •Increased thyroid hormone level      What other symptoms may I have with a fast heart rate? You may have no other symptoms with your fast heart rate, or you may have any of the following:   •Dizziness or lightheadedness      •Chest pain      •Fluttering or pounding heart      •Shortness of breath      How is a fast heart rate treated? You may need treatment if your fast heart rate continues or happens often. You may need medicine, procedures, or surgery. Your provider may also send you to a cardiologist for other tests.    How do I check my heart rate (pulse)? Your healthcare provider will show you how to check your pulse, and how often to check it. Write down how fast your pulse is and if it feels regular or like it is skipping beats. Also write down the activity you were doing if your heart rate is above 100. Bring the information with you to your follow-up appointment.     How to Take a Pulse         What can I do to help manage or prevent a fast heart rate?   •Talk to your healthcare provider about all your current medicines. He or she may change a medicine if it is causing your fast heart rate. Do not stop taking any medicine unless directed by your provider.      •Have less caffeine. Caffeine can increase your heart rate.      •Limit or do not drink alcohol. Alcohol can increase your heart rate. Ask your healthcare provider if it is okay for you to drink any alcohol. He or she can help you set limits for the number of drinks you have in 24 hours and in a week. A drink of alcohol is 12 ounces of beer, 5 ounces of wine, or 1½ ounces of liquor.      •Do not smoke. Nicotine and other chemicals in cigarettes can cause damage to your heart. Ask your healthcare provider for information if you currently smoke and need help to quit. E-cigarettes or smokeless tobacco still contain nicotine. Talk to your healthcare provider before you use these products.       •Do not use illegal drugs. Drugs such as meth and cocaine can increase your heart rate. Talk to your healthcare provider if you use illegal drugs and want to quit.      •Get more rest. Fatigue can cause your heart rate to increase. Ask your healthcare provider about the best exercise plan for you.      •Eat heart-healthy foods. These include fruits, vegetables, whole-grain breads, low-fat dairy products, beans, lean meats, and fish. Replace butter and margarine with heart-healthy oils such as olive oil and canola oil.             •Exercise regularly and maintain a healthy weight. Ask about the best exercise plan for you. Ask your healthcare provider what a healthy weight is for you. Ask him or her to help you create a safe weight loss plan if you are overweight.   Family Walking for Exercise           •Learn ways to cope with stress. Stress, fear, and anxiety can cause a fast heart rate. Your healthcare provider may recommend relaxation techniques and deep breathing exercises. Your healthcare provider may recommend you talk to someone about your stress or anxiety, such as a counselor or a trusted friend.      Call your local emergency number (911 in the [place]) or have someone call if:   •You have any of the following signs of a heart attack: ?Squeezing, pressure, or pain in your chest      ?You may also have any of the following: ?Discomfort or pain in your back, neck, jaw, stomach, or arm      ?Shortness of breath      ?Nausea or vomiting      ?Lightheadedness or a sudden cold sweat        •You cannot be woken.      When should I call my doctor?   •Your pulse is faster than you were told it should be.      •You have a fast heart rate often.      •You feel weak or dizzy.      •You have questions or concerns about your condition or care.      CARE AGREEMENT:    You have the right to help plan your care. Learn about your health condition and how it may be treated. Discuss treatment options with your healthcare providers to decide what care you want to receive. You always have the right to refuse treatment.

## 2021-11-04 NOTE — ED PROVIDER NOTE - NSICDXFAMILYHX_GEN_ALL_CORE_FT
FAMILY HISTORY:  Father  Still living? No  Family history of colon cancer, Age at diagnosis: 71-80  Family history of diabetes mellitus, Age at diagnosis: Age Unknown  Family history of hypertension, Age at diagnosis: Age Unknown    Mother  Still living? Yes, Estimated age: Age Unknown  Family history of diabetes mellitus, Age at diagnosis: Age Unknown  Family history of hypertension, Age at diagnosis: Age Unknown    Sibling  Still living? Yes, Estimated age: Age Unknown  Family history of congenital heart disease, Age at diagnosis: Age Unknown    Child  Still living? Yes, Estimated age: Age Unknown  Family history of lung cancer, Age at diagnosis: Age Unknown  Family history of thyroid disorder, Age at diagnosis: Age Unknown

## 2021-11-10 ENCOUNTER — EMERGENCY (EMERGENCY)
Facility: HOSPITAL | Age: 86
LOS: 1 days | Discharge: HOME | End: 2021-11-10
Attending: EMERGENCY MEDICINE | Admitting: EMERGENCY MEDICINE
Payer: MEDICARE

## 2021-11-10 VITALS
SYSTOLIC BLOOD PRESSURE: 112 MMHG | HEART RATE: 97 BPM | RESPIRATION RATE: 18 BRPM | DIASTOLIC BLOOD PRESSURE: 76 MMHG | OXYGEN SATURATION: 96 %

## 2021-11-10 VITALS
TEMPERATURE: 99 F | OXYGEN SATURATION: 97 % | DIASTOLIC BLOOD PRESSURE: 78 MMHG | HEIGHT: 62 IN | RESPIRATION RATE: 17 BRPM | WEIGHT: 141.1 LBS | SYSTOLIC BLOOD PRESSURE: 130 MMHG | HEART RATE: 125 BPM

## 2021-11-10 DIAGNOSIS — Z98.89 OTHER SPECIFIED POSTPROCEDURAL STATES: Chronic | ICD-10-CM

## 2021-11-10 DIAGNOSIS — H26.9 UNSPECIFIED CATARACT: Chronic | ICD-10-CM

## 2021-11-10 LAB
ALBUMIN SERPL ELPH-MCNC: 3.4 G/DL — SIGNIFICANT CHANGE UP (ref 3.3–5)
ALP SERPL-CCNC: 78 U/L — SIGNIFICANT CHANGE UP (ref 40–120)
ALT FLD-CCNC: 20 U/L — SIGNIFICANT CHANGE UP (ref 12–78)
ANION GAP SERPL CALC-SCNC: 8 MMOL/L — SIGNIFICANT CHANGE UP (ref 5–17)
AST SERPL-CCNC: 15 U/L — SIGNIFICANT CHANGE UP (ref 15–37)
BASOPHILS # BLD AUTO: 0 K/UL — SIGNIFICANT CHANGE UP (ref 0–0.2)
BASOPHILS NFR BLD AUTO: 0 % — SIGNIFICANT CHANGE UP (ref 0–2)
BILIRUB SERPL-MCNC: 0.5 MG/DL — SIGNIFICANT CHANGE UP (ref 0.2–1.2)
BUN SERPL-MCNC: 18 MG/DL — SIGNIFICANT CHANGE UP (ref 7–23)
CALCIUM SERPL-MCNC: 9.4 MG/DL — SIGNIFICANT CHANGE UP (ref 8.5–10.1)
CHLORIDE SERPL-SCNC: 95 MMOL/L — LOW (ref 96–108)
CO2 SERPL-SCNC: 27 MMOL/L — SIGNIFICANT CHANGE UP (ref 22–31)
CREAT SERPL-MCNC: 0.59 MG/DL — SIGNIFICANT CHANGE UP (ref 0.5–1.3)
EOSINOPHIL # BLD AUTO: 0.1 K/UL — SIGNIFICANT CHANGE UP (ref 0–0.5)
EOSINOPHIL NFR BLD AUTO: 1 % — SIGNIFICANT CHANGE UP (ref 0–6)
GLUCOSE SERPL-MCNC: 105 MG/DL — HIGH (ref 70–99)
HCT VFR BLD CALC: 37.1 % — SIGNIFICANT CHANGE UP (ref 34.5–45)
HGB BLD-MCNC: 12.8 G/DL — SIGNIFICANT CHANGE UP (ref 11.5–15.5)
LYMPHOCYTES # BLD AUTO: 2.29 K/UL — SIGNIFICANT CHANGE UP (ref 1–3.3)
LYMPHOCYTES # BLD AUTO: 22 % — SIGNIFICANT CHANGE UP (ref 13–44)
MCHC RBC-ENTMCNC: 29.8 PG — SIGNIFICANT CHANGE UP (ref 27–34)
MCHC RBC-ENTMCNC: 34.5 GM/DL — SIGNIFICANT CHANGE UP (ref 32–36)
MCV RBC AUTO: 86.3 FL — SIGNIFICANT CHANGE UP (ref 80–100)
MONOCYTES # BLD AUTO: 0.52 K/UL — SIGNIFICANT CHANGE UP (ref 0–0.9)
MONOCYTES NFR BLD AUTO: 5 % — SIGNIFICANT CHANGE UP (ref 2–14)
NEUTROPHILS # BLD AUTO: 7.3 K/UL — SIGNIFICANT CHANGE UP (ref 1.8–7.4)
NEUTROPHILS NFR BLD AUTO: 66 % — SIGNIFICANT CHANGE UP (ref 43–77)
NRBC # BLD: SIGNIFICANT CHANGE UP /100 WBCS (ref 0–0)
NT-PROBNP SERPL-SCNC: 73 PG/ML — SIGNIFICANT CHANGE UP (ref 0–450)
PLATELET # BLD AUTO: 546 K/UL — HIGH (ref 150–400)
POTASSIUM SERPL-MCNC: 3.5 MMOL/L — SIGNIFICANT CHANGE UP (ref 3.5–5.3)
POTASSIUM SERPL-SCNC: 3.5 MMOL/L — SIGNIFICANT CHANGE UP (ref 3.5–5.3)
PROT SERPL-MCNC: 6.8 G/DL — SIGNIFICANT CHANGE UP (ref 6–8.3)
RBC # BLD: 4.3 M/UL — SIGNIFICANT CHANGE UP (ref 3.8–5.2)
RBC # FLD: 15.3 % — HIGH (ref 10.3–14.5)
SODIUM SERPL-SCNC: 130 MMOL/L — LOW (ref 135–145)
TROPONIN I, HIGH SENSITIVITY RESULT: 30.5 NG/L — SIGNIFICANT CHANGE UP
WBC # BLD: 10.43 K/UL — SIGNIFICANT CHANGE UP (ref 3.8–10.5)
WBC # FLD AUTO: 10.43 K/UL — SIGNIFICANT CHANGE UP (ref 3.8–10.5)

## 2021-11-10 PROCEDURE — 80053 COMPREHEN METABOLIC PANEL: CPT

## 2021-11-10 PROCEDURE — 83880 ASSAY OF NATRIURETIC PEPTIDE: CPT

## 2021-11-10 PROCEDURE — 99285 EMERGENCY DEPT VISIT HI MDM: CPT

## 2021-11-10 PROCEDURE — 85025 COMPLETE CBC W/AUTO DIFF WBC: CPT

## 2021-11-10 PROCEDURE — 99284 EMERGENCY DEPT VISIT MOD MDM: CPT | Mod: 25

## 2021-11-10 PROCEDURE — 71045 X-RAY EXAM CHEST 1 VIEW: CPT | Mod: 26

## 2021-11-10 PROCEDURE — 93005 ELECTROCARDIOGRAM TRACING: CPT

## 2021-11-10 PROCEDURE — 84484 ASSAY OF TROPONIN QUANT: CPT

## 2021-11-10 PROCEDURE — 93010 ELECTROCARDIOGRAM REPORT: CPT

## 2021-11-10 PROCEDURE — 36415 COLL VENOUS BLD VENIPUNCTURE: CPT

## 2021-11-10 PROCEDURE — 71045 X-RAY EXAM CHEST 1 VIEW: CPT

## 2021-11-10 RX ORDER — METOPROLOL TARTRATE 50 MG
1 TABLET ORAL
Qty: 14 | Refills: 0
Start: 2021-11-10 | End: 2021-11-23

## 2021-11-10 RX ORDER — SODIUM CHLORIDE 9 MG/ML
1000 INJECTION INTRAMUSCULAR; INTRAVENOUS; SUBCUTANEOUS ONCE
Refills: 0 | Status: COMPLETED | OUTPATIENT
Start: 2021-11-10 | End: 2021-11-10

## 2021-11-10 RX ORDER — METOPROLOL TARTRATE 50 MG
25 TABLET ORAL ONCE
Refills: 0 | Status: COMPLETED | OUTPATIENT
Start: 2021-11-10 | End: 2021-11-10

## 2021-11-10 RX ADMIN — Medication 25 MILLIGRAM(S): at 18:20

## 2021-11-10 RX ADMIN — SODIUM CHLORIDE 1000 MILLILITER(S): 9 INJECTION INTRAMUSCULAR; INTRAVENOUS; SUBCUTANEOUS at 15:47

## 2021-11-10 NOTE — ED PROVIDER NOTE - OBJECTIVE STATEMENT
85 yo female with h/o asthma, breast cancer, lung cancer, copd, htn presents to the ED for generalized weakness and elevated heart rate. Patient was seen in ED on 11/4 for similar symptoms of tachycardia. Patient had ekg (sinus tachy), labs, trop, ddimer, tsh, cta chest neg for pe and dced home. Patient has appointment with outpatient cards Dr. Guerrero in 2 weeks. Patient states shes been checking her HR daily and its been ranging from 80s-100s however today HR was 140s, spoke to her pmd who recommended taking an extra losartan. Patient still feels weak however denies chest pain, sob, syncope or palpitations. Associated with lightheadedness. no LOC. no blood thinners.    pmd: Dr. Jose Alberto Peck

## 2021-11-10 NOTE — CONSULT NOTE ADULT - ATTENDING COMMENTS
Seen/examined. agree with above.  sinus tachycardia on ekg, which has been an issue recently  multifactorial in the setting of dehydration, inhalers and MR based on exam  stop HCTZ  give IVF  hopefully can dc home and follow up with Dr. Hernandez

## 2021-11-10 NOTE — CONSULT NOTE ADULT - SUBJECTIVE AND OBJECTIVE BOX
[facility] - Myra Hernandez, Piyush Fischer, Irma, Salvatore, Tramaine Brown  Office Number: [phone]    Initial Consult Note  CHIEF COMPLAINT: Patient is a 86y old  Female who presents with a chief complaint of weakness   HPI:  86F with no cardiac Hx but with HTN, lung Ca s/p LLL lobectomy, breast Ca presented to the ED c/o weakness and palpitations. Patient was here Nov 4th for similar complaints, had work up done including CTA which was negative, thought dehydration as the cause of tachycardia and was discharged home. She checked her pulse Ox which showed her HR at 140.  Denies fever, chills, SOB, CORDERO, orthopnea, sick contact, recent travel.  Denies N/V or diarrhea. Admits to having uterine prolapse which predisposes her to UTI.  Had recent treatment for such.  She has chronic LE edema for which she take HCTZ and has been taking.  She called PCP, was told to take extra losartan which she did. In ED, T(F): 99.2, HR: 125, BP: 130/78, RR: 17, SpO2: 97%. EKG showed sinus tachycardia , rate of 120.  Tele showed sinus tach as well, rate ranging 90-100s.      Allergies  adhesives (Hives)  Advair Diskus (Rash)  Flovent (Rash)  Percocet 10/325 (Pruritus; Hives)  Pulmicort Flexhaler (Rash)    PAST MEDICAL & SURGICAL HISTORY:  Hypertension  Asthma  Breast cancer H/o 10/1998  Vasovagal syndrome  Lymph edema right - NO IV NO BLOOD DRAW, NO B/P RIGHT ARM  Barretts esophagus  COPD (chronic obstructive pulmonary disease)  Abnormal lung field  Cataract Bilateral  Thyroid nodule  Lung cancer  S/P mastectomy, bilateral in 1998  Deviated nasal septum had surgery in 1994  S/P cataract surgery bilateral in 2009  S/P wrist surgery right in 2012  Cataract 2009 B/l  H/O arthroplasty  right knee replacement 2013  H/O arthroscopy of left knee  2013  S/P D&amp;C (status post dilation and curettage)  Endometrial bx 2012  History of lung surgery  Left lng wedge resection   7/6/15    MEDICATIONS  (STANDING):    MEDICATIONS  (PRN):    FAMILY HISTORY:  Family history of hypertension (Father, Mother)  1 daughter alive with HTN    Family history of diabetes mellitus (Father, Mother)    Family history of colon cancer (Father)    Family history of congenital heart disease (Sibling)    Family history of lung cancer (Child)    Family history of thyroid disorder (Child)  1 daughter    SOCIAL HISTORY  Marital Status:   Occupation:   Lives with:     SUBSTANCE USE  Tobacco Usage:  ( ) None ( ) never smoked   ( ) former smoker  ( ) current smoker; Packs per day:   Alcohol Usage: ( ) none  ( ) occasional ( ) 2-3 times a week ( ) daily; Last drink:   Recreational drugs ( ) None    REVIEW OF SYSTEMS   CONSTITUTIONAL: + weakness, No fevers, No chills, No fatigue, No weight gain  EYES: No vision changes, No vertigo, No throat pain   ENT: No congestion, No ear pain, No sore throat.  NECK: No pain, No stiffness  RESPIRATORY: No shortness of breath, No cough, No wheezing, No hemoptysis  CARDIOVASCULAR: No chest pain. + palpitations, No CORDERO, No orthopnea, No PND, No pleuritic pain  GASTROINTESTINAL: No abdominal pain, No nausea, No vomiting, No hematemesis, No diarrhea No constipation. No melena  GENITOURINARY: No dysuria, No frequency, No incontinence, No hematuria  NEUROLOGICAL: No dizziness, No lightheadedness, No syncope, No LOC, No headache, No numbness, No weakness  MUSCULOSKELETAL: No joint pain, No joint swelling.  PSYCHIATRIC: No anxiety, No depression  DERMATOLOGY: No diaphoresis. No itching, No rashes, No pressure ulcers  HEME/LYMPH: No easy bruising, or bleeding gums  All other review of systems is negative unless indicated above.    VITAL SIGNS:   Vital Signs Last 24 Hrs  T(C): 37.3 (10 Nov 2021 13:57), Max: 37.3 (10 Nov 2021 13:57)  T(F): 99.2 (10 Nov 2021 13:57), Max: 99.2 (10 Nov 2021 13:57)  HR: 125 (10 Nov 2021 13:15) (125 - 125)  BP: 130/78 (10 Nov 2021 13:15) (130/78 - 130/78)  BP(mean): --  RR: 17 (10 Nov 2021 13:15) (17 - 17)  SpO2: 97% (10 Nov 2021 13:15) (97% - 97%)    Physical Exam:    Appearance: NAD, no distress, alert, Well developed   HEENT: Moist Mucous Membranes, Anicteric  Cardiovascular: Regular rate and rhythm, Normal S1 S2, No JVD, No murmurs, No rubs, gallops or clicks  Respiratory: Lungs clear to auscultation. No rales, No rhonchi, No wheezing. No tenderness to palpation  Gastrointestinal:  Soft, Non-tender, + BS  Neurologic: Non-focal  Skin: Warm and dry, No rashes, No ecchymosis, No cyanosis, No ulcers   Musculoskeletal: No clubbing, No cyanosis  Vascular: Peripheral pulses palpable 2+ bilaterally  Psychiatry: Mood & affect appropriate  Lymph: No peripheral edema.     I&O's Summary    LABS: All Labs Reviewed:  Troponin I, High Sensitivity Result: 9.5 ng/L (11-04-21 @ 13:49)  Serum Pro-Brain Natriuretic Peptide: 142 pg/mL (11-04-21 @ 13:49)  Thyroid Stimulating Hormone, Serum: 1.68 uIU/mL (11-04-21 @ 13:49)  D-Dimer Assay, Quantitative: <150 ng/mL DDU (11-04-21 @ 14:41)    12 Lead ECG:   Ventricular Rate 117 BPM  Atrial Rate 117 BPM  P-R Interval 150 ms  QRS Duration 84 ms  Q-T Interval 328 ms  QTC Calculation(Bazett) 457 ms  P Axis 42 degrees  R Axis 55 degrees  T Axis 39 degrees  Diagnosis Line Sinus tachycardia  Possible Left atrial enlargement  Nonspecific ST abnormality  Confirmed by GIRMA CARBONE ([id number]) on 11/4/2021 3:19:01 PM (11-04-21 @ 13:18) [facility] - Myra Hernandez, Piyush Fischer, Irma, Salvatore, Stephanie, Tramaine  Office Number: [phone]    Initial Consult Note  CHIEF COMPLAINT: Patient is a 86y old  Female who presents with a chief complaint of weakness   HPI:  86F with no cardiac Hx but with HTN, lung Ca s/p LLL lobectomy, breast Ca presented to the ED c/o weakness and palpitations. She had been feeling weak for almost 2 weeks. Patient was here Nov 4th for similar complaints, had work up done including CTA which was negative, thought dehydration as the cause of tachycardia and was discharged home. She checked her pulse Ox which showed her HR at 140 at home.  Denies fever, chills, SOB, CORDERO, orthopnea, sick contact, recent travel.  Denies N/V or diarrhea. Admits to having uterine prolapse which predisposes her to UTI.  Had recent treatment for such.  She has chronic LE edema for which she take HCTZ and has been taking. She called PCP, was told to take extra losartan which she did and came to ED. In ED, T(F): 99.2, HR: 125, BP: 130/78, RR: 17, SpO2: 97%. EKG showed sinus tachycardia , rate of 120.  Tele showed sinus tach as well, rate ranging 90-110s.  Rates go up to 120s with conversation. She has an appointment coming up with Dr Hernandez for cardiology.     Allergies  adhesives (Hives)  Advair Diskus (Rash)  Flovent (Rash)  Percocet 10/325 (Pruritus; Hives)  Pulmicort Flexhaler (Rash)    PAST MEDICAL & SURGICAL HISTORY:  Hypertension  Asthma  Breast cancer H/o 10/1998  Vasovagal syndrome  Lymph edema right - NO IV NO BLOOD DRAW, NO B/P RIGHT ARM  Barretts esophagus  COPD (chronic obstructive pulmonary disease)  Abnormal lung field  Cataract Bilateral  Thyroid nodule  Lung cancer  S/P mastectomy, bilateral in 1998  Deviated nasal septum had surgery in 1994  S/P cataract surgery bilateral in 2009  S/P wrist surgery right in 2012  Cataract 2009 B/l  H/O arthroplasty  right knee replacement 2013  H/O arthroscopy of left knee  2013  S/P D&amp;C (status post dilation and curettage)  Endometrial bx 2012  History of lung surgery  Left lng wedge resection   7/6/15    MEDICATIONS  (STANDING):    MEDICATIONS  (PRN):    FAMILY HISTORY:  Family history of hypertension (Father, Mother)  1 daughter alive with HTN    Family history of diabetes mellitus (Father, Mother)    Family history of colon cancer (Father)    Family history of congenital heart disease (Sibling)    Family history of lung cancer (Child)    Family history of thyroid disorder (Child)  1 daughter    SOCIAL HISTORY  Marital Status:   Occupation: Retired director of   Lives with: alone     SUBSTANCE USE  Tobacco Usage:  ( ) None (x ) never smoked   ( ) former smoker  ( ) current smoker; Packs per day:   Alcohol Usage: (x ) none  ( ) occasional ( ) 2-3 times a week ( ) daily; Last drink:   Recreational drugs (x ) None    REVIEW OF SYSTEMS   CONSTITUTIONAL: + weakness, No fevers, No chills, No fatigue, No weight gain  EYES: No vision changes, No vertigo, No throat pain   ENT: No congestion, No ear pain, No sore throat.  NECK: No pain, No stiffness  RESPIRATORY: No shortness of breath, No cough, No wheezing, No hemoptysis  CARDIOVASCULAR: No chest pain. + palpitations, No CORDERO, No orthopnea, No PND, No pleuritic pain  GASTROINTESTINAL: No abdominal pain, No nausea, No vomiting, No hematemesis, No diarrhea No constipation. No melena  GENITOURINARY: No dysuria, No frequency, No incontinence, No hematuria  NEUROLOGICAL: No dizziness, No lightheadedness, No syncope, No LOC, No headache, No numbness, No weakness  MUSCULOSKELETAL: No joint pain, No joint swelling.  PSYCHIATRIC: No anxiety, No depression  DERMATOLOGY: No diaphoresis. No itching, No rashes, No pressure ulcers  HEME/LYMPH: No easy bruising, or bleeding gums  All other review of systems is negative unless indicated above.    VITAL SIGNS:   Vital Signs Last 24 Hrs  T(C): 37.3 (10 Nov 2021 13:57), Max: 37.3 (10 Nov 2021 13:57)  T(F): 99.2 (10 Nov 2021 13:57), Max: 99.2 (10 Nov 2021 13:57)  HR: 125 (10 Nov 2021 13:15) (125 - 125)  BP: 130/78 (10 Nov 2021 13:15) (130/78 - 130/78)  BP(mean): --  RR: 17 (10 Nov 2021 13:15) (17 - 17)  SpO2: 97% (10 Nov 2021 13:15) (97% - 97%)    Physical Exam:    Appearance: NAD, no distress, alert, Frail   HEENT: Moist Mucous Membranes, Anicteric  Cardiovascular: Regular rate and rhythm, Normal S1 S2, No JVD, + murmurs, No rubs, gallops or clicks  Respiratory: Lungs clear to auscultation. No rales, No rhonchi, No wheezing. No tenderness to palpation  Gastrointestinal:  Soft, Non-tender, + BS  Neurologic: Non-focal  Skin: Warm and dry, No rashes, No ecchymosis, No cyanosis, No ulcers   Musculoskeletal: No clubbing, No cyanosis  Vascular: Peripheral pulses palpable 2+ bilaterally  Psychiatry: Mood & affect appropriate  Lymph: +1-2 peripheral edema.     I&O's Summary    LABS: All Labs Reviewed:  Troponin I, High Sensitivity Result: 9.5 ng/L (11-04-21 @ 13:49)  Serum Pro-Brain Natriuretic Peptide: 142 pg/mL (11-04-21 @ 13:49)  Thyroid Stimulating Hormone, Serum: 1.68 uIU/mL (11-04-21 @ 13:49)  D-Dimer Assay, Quantitative: <150 ng/mL DDU (11-04-21 @ 14:41)    12 Lead ECG:   Ventricular Rate 117 BPM  Atrial Rate 117 BPM  P-R Interval 150 ms  QRS Duration 84 ms  Q-T Interval 328 ms  QTC Calculation(Bazett) 457 ms  P Axis 42 degrees  R Axis 55 degrees  T Axis 39 degrees  Diagnosis Line Sinus tachycardia  Possible Left atrial enlargement  Nonspecific ST abnormality  Confirmed by GIRMA CARBONE ([id number]) on 11/4/2021 3:19:01 PM (11-04-21 @ 13:18)

## 2021-11-10 NOTE — ED PROVIDER NOTE - PATIENT PORTAL LINK FT
You can access the FollowMyHealth Patient Portal offered by [facility] by registering at the following website: [email or web address]. By joining [employer]’s FollowMyHealth portal, you will also be able to view your health information using other applications (apps) compatible with our system.

## 2021-11-10 NOTE — ED PROVIDER NOTE - CARE PROVIDER_API CALL
Bhavesh Redd)  Cardiovascular Disease; Internal Medicine  [address], [place] [id number]  Phone: [phone]  Fax: [phone]  Follow Up Time: 1-3 Days

## 2021-11-10 NOTE — CONSULT NOTE ADULT - ASSESSMENT
86F with no cardiac Hx but with HTN, lung Ca s/p LLL lobectomy, breast Ca presented to the ED c/o weakness and palpitations. She had been feeling weak for almost 2 weeks. Patient was here Nov 4th for similar complaints, had work up done including CTA which was negative, thought dehydration as the cause of tachycardia and was discharged home. She checked her pulse Ox which showed her HR at 140 at home.  Denies fever, chills, SOB, CORDERO, orthopnea, sick contact, recent travel.  Denies N/V or diarrhea. Admits to having uterine prolapse which predisposes her to UTI.  Had recent treatment for such.  She has chronic LE edema for which she take HCTZ and has been taking. She has an appointment coming up with Dr Hernandez for cardiology.     Tachycardia/HTN  - Unclear etiology of tachycardia. Possibly 2/2 dehydration in the setting of HCTZ. Recent TSH normal.   - EKG showed sinus tachycardia , rate of 120.  No evidence of Afib or any other arrhythmias  - Tele showed sinus tach as well, rate ranging 90-110s.  Rates go up to 120s with conversation  - TroponinI hsT: <-30.5  - Would give IVF to check for HR response to fluids  - Serum Pro-Brain Natriuretic Peptide: 73 pg/mL (11-10-21 @ 14:20)  - Trial BB if labs come back normal. Can start Metoprolol 12.5 mg PO BID  - She can call office for echocardiogram prior to appointment with Dr Hernandez   - If Hr improves after IVF and BB, she can be discharged home. Can follow up outpatient for further cardiac care.   - Other cardiovascular workup will depend on clinical course.    Isaiah Montgomery, MS FNP, [facility]  Nurse Practitioner- Cardiology   Spectra #[phone] 86F with no cardiac Hx but with HTN, lung Ca s/p LLL lobectomy, breast Ca presented to the ED c/o weakness and palpitations. She had been feeling weak for almost 2 weeks. Patient was here Nov 4th for similar complaints, had work up done including CTA which was negative, thought dehydration as the cause of tachycardia and was discharged home. She checked her pulse Ox which showed her HR at 140 at home.  Denies fever, chills, SOB, CORDERO, orthopnea, sick contact, recent travel.  Denies N/V or diarrhea. Admits to having uterine prolapse which predisposes her to UTI.  Had recent treatment for such.  She has chronic LE edema for which she take HCTZ and has been taking. She has an appointment coming up with Dr Hernandez for cardiology.     Tachycardia/HTN  - Unclear etiology of tachycardia. Possibly 2/2 dehydration in the setting of HCTZ. Recent TSH normal.   - EKG showed sinus tachycardia , rate of 120.  No evidence of Afib or any other arrhythmias  - Tele showed sinus tach as well, rate ranging 90-110s.  Rates go up to 120s with conversation  - TroponinI hsT: <-30.5  - Would give IVF to check for HR response to fluids  - Serum Pro-Brain Natriuretic Peptide: 73 pg/mL (11-10-21 @ 14:20)  - Trial BB if labs come back normal. Can start Toprol XL 25  - She can call office for echocardiogram prior to appointment with Dr Hernandez, as she does have a notable systolic murmur on exam.  - If Hr improves after IVF and BB, she can be discharged home. Can follow up outpatient for further cardiac care.   - Other cardiovascular workup will depend on clinical course.    Isaiah Montgomery, MS FNP, [facility]  Nurse Practitioner- Cardiology   Spectra #[phone]

## 2021-11-10 NOTE — ED PROVIDER NOTE - CLINICAL SUMMARY MEDICAL DECISION MAKING FREE TEXT BOX
87 yo female with h/o asthma, breast cancer, lung cancer, copd, htn presents to the ED for generalized weakness and elevated heart rate. Patient was seen in ED on 11/4 for similar symptoms of tachycardia. Patient had ekg (sinus tachy), labs, trop, ddimer, tsh, cta chest neg for pe and dced home. Patient has appointment with outpatient cards Dr. Guerrero in 2 weeks. Patient states shes been checking her HR daily and its been ranging from 80s-100s however today HR was 140s, spoke to her pmd who recommended taking an extra losartan. Patient still feels weak however denies chest pain, sob, syncope or palpitations. Associated with lightheadedness. no LOC. no blood thinners. PE: as above. A/P: ekg, labs, cards consult Ted: 85 yo female with h/o asthma, breast cancer, lung cancer, copd, htn presents to the ED for generalized weakness and elevated heart rate. Patient was seen in ED on 11/4 for similar symptoms of tachycardia. Patient had ekg (sinus tachy), labs, trop, ddimer, tsh, cta chest neg for pe and dced home. Patient has appointment with outpatient renan Guerrero in 2 weeks. Patient states shes been checking her HR daily and its been ranging from 80s-100s however today HR was 140s, spoke to her pmd who recommended taking an extra losartan. Patient still feels weak however denies chest pain, sob, syncope or palpitations. Associated with lightheadedness. no LOC. no blood thinners. PE: as above. A/P: ekg, labs, cards consult    Pt seen by cards, trial of IV hydration then b-blocker if needed.

## 2021-11-10 NOTE — ED ADULT NURSE NOTE - OBJECTIVE STATEMENT
Patient is a 85yo F, arrived to ED via walk-in, AAOx4, tachycardic, complaining of generalized weakness.  Patient states she felt weak and took her pulse to find it high.  PIV placed, labs drawn and sent, EKG done, placed on cardiac monitor.

## 2021-11-12 ENCOUNTER — APPOINTMENT (OUTPATIENT)
Dept: CARDIOLOGY | Facility: CLINIC | Age: 86
End: 2021-11-12
Payer: MEDICARE

## 2021-11-12 PROCEDURE — 93306 TTE W/DOPPLER COMPLETE: CPT

## 2021-11-17 ENCOUNTER — APPOINTMENT (OUTPATIENT)
Dept: PULMONOLOGY | Facility: CLINIC | Age: 86
End: 2021-11-17
Payer: MEDICARE

## 2021-11-17 PROCEDURE — 99214 OFFICE O/P EST MOD 30 MIN: CPT

## 2021-11-17 RX ORDER — SULFAMETHOXAZOLE AND TRIMETHOPRIM 800; 160 MG/1; MG/1
800-160 TABLET ORAL TWICE DAILY
Qty: 6 | Refills: 0 | Status: DISCONTINUED | COMMUNITY
Start: 2021-10-13 | End: 2021-11-17

## 2021-11-17 RX ORDER — AMOXICILLIN AND CLAVULANATE POTASSIUM 500; 125 MG/1; MG/1
500-125 TABLET, FILM COATED ORAL
Qty: 10 | Refills: 0 | Status: DISCONTINUED | COMMUNITY
Start: 2021-10-16 | End: 2021-11-17

## 2021-11-17 NOTE — ASSESSMENT
[FreeTextEntry1] : Asthma stable on current inhalers.  Flu vaccine given (in buttock by patient request)\par No evidence of adverse effect of beta-blockers on asthma at this time

## 2021-11-17 NOTE — PROCEDURE
[FreeTextEntry1] : EXAM: CT ANGIO CHEST PULM ART WAWIC\par \par \par PROCEDURE DATE: 11/04/2021\par \par \par \par INTERPRETATION: CTA CHEST\par \par INDICATION: Tachycardia and palpitations\par \par TECHNIQUE: Axial images of the chest were obtained after the administration of 40 mL of Omnipaque 350. Maximum intensity projection images were generated.\par \par COMPARISON: CT chest 8/25/2021.\par \par FINDINGS:\par \par Pulmonary arteries: Normal caliber main pulmonary artery. No pulmonary embolism\par \par Lungs, airways and pleura: Left upper lobectomy. Patent airways to the segmental bronchi. Stable mild mucoid impaction in the middle lobe and small right lower lobe intrapulmonary lymph node. Unchanged mild interlobular septal thickening in the middle lobe. Unremarkable pleura.\par \par Lymph nodes and mediastinum: No enlarged lymph nodes. Unremarkable thyroid.\par \par Heart, pericardium, and vasculature: Normal heart size. No pericardial effusion. Normal caliber aorta.\par \par Bones and soft tissues: Degenerative changes of the spine.\par \par Other: Partially included small right kidney cyst.\par \par \par IMPRESSION:\par \par No pulmonary embolism or other acute pulmonary process.\par \par --- End of Report ---\par

## 2021-11-17 NOTE — HISTORY OF PRESENT ILLNESS
[TextBox_4] : Follow-up for asthma.  Breathing much better and hoarseness resolved now that she is on Qvar.  She was hospitalized for tachycardia issue and is now on metoprolol.  She is requesting flu vaccine.\par

## 2021-11-17 NOTE — PHYSICAL EXAM
[No Acute Distress] : no acute distress [Normal Oropharynx] : normal oropharynx [Normal Appearance] : normal appearance [No Neck Mass] : no neck mass [No Abnormalities] : no abnormalities [Benign] : benign [Normal Gait] : normal gait [No Clubbing] : no clubbing [No Cyanosis] : no cyanosis [No Edema] : no edema [FROM] : FROM [Normal Color/ Pigmentation] : normal color/ pigmentation [No Focal Deficits] : no focal deficits [Oriented x3] : oriented x3 [Normal Affect] : normal affect [TextBox_54] : 2/6/systolic murmur [TextBox_68] : slight wheeze

## 2021-11-18 ENCOUNTER — MED ADMIN CHARGE (OUTPATIENT)
Age: 86
End: 2021-11-18

## 2021-11-18 DIAGNOSIS — Z78.9 OTHER SPECIFIED HEALTH STATUS: ICD-10-CM

## 2021-11-18 DIAGNOSIS — Z83.3 FAMILY HISTORY OF DIABETES MELLITUS: ICD-10-CM

## 2021-11-18 RX ORDER — ASPIRIN 81 MG/1
81 TABLET ORAL
Qty: 90 | Refills: 1 | Status: ACTIVE | COMMUNITY
Start: 2021-11-18

## 2021-11-18 RX ORDER — HYDROCHLOROTHIAZIDE 12.5 MG/1
12.5 TABLET ORAL
Refills: 0 | Status: DISCONTINUED | COMMUNITY
End: 2021-11-18

## 2021-11-19 ENCOUNTER — APPOINTMENT (OUTPATIENT)
Dept: CARDIOLOGY | Facility: CLINIC | Age: 86
End: 2021-11-19
Payer: MEDICARE

## 2021-11-19 ENCOUNTER — NON-APPOINTMENT (OUTPATIENT)
Age: 86
End: 2021-11-19

## 2021-11-19 VITALS
OXYGEN SATURATION: 97 % | HEART RATE: 93 BPM | SYSTOLIC BLOOD PRESSURE: 133 MMHG | DIASTOLIC BLOOD PRESSURE: 83 MMHG | WEIGHT: 147 LBS | BODY MASS INDEX: 27.05 KG/M2 | HEIGHT: 62 IN

## 2021-11-19 DIAGNOSIS — R09.89 OTHER SPECIFIED SYMPTOMS AND SIGNS INVOLVING THE CIRCULATORY AND RESPIRATORY SYSTEMS: ICD-10-CM

## 2021-11-19 PROCEDURE — 93000 ELECTROCARDIOGRAM COMPLETE: CPT

## 2021-11-19 PROCEDURE — 99215 OFFICE O/P EST HI 40 MIN: CPT

## 2021-11-19 NOTE — PHYSICAL EXAM
[Not Palpable] : not palpable [Normal Rate] : normal [Normal S1] : normal S1 [Normal S2] : normal S2 [II] : a grade 2 [___ +] : bilateral [unfilled]U+ pretibial pitting edema [2+] : left 2+ [No Abnormalities] : the abdominal aorta was not enlarged and no bruit was heard [Soft] : abdomen soft [Non Tender] : non-tender [Normal Gait] : normal gait [No Cyanosis] : no cyanosis [No Clubbing] : no clubbing [Normal] : alert and oriented, normal memory [S3] : no S3 [S4] : no S4 [Right Carotid Bruit] : no bruit heard over the right carotid [Left Carotid Bruit] : no bruit heard over the left carotid [Right Femoral Bruit] : no bruit heard over the right femoral artery [Left Femoral Bruit] : no bruit heard over the left femoral artery

## 2021-11-19 NOTE — CARDIOLOGY SUMMARY
[de-identified] : 11/21- RST, nonspecific repolarization [de-identified] : 11/21- EF 66^, mild-mod MR/MS, PG 19, mild A!, mild-mod TR, PAP=40, LVOT 49, Choddal CORNEL, I DD [de-identified] : Carotid Doppler 12/18- mild plaque\par ZOEY - Normal

## 2021-11-19 NOTE — HISTORY OF PRESENT ILLNESS
[FreeTextEntry1] : I saw Meg Flanagan in the office today for cardiac follow-up.  She was seen in the emergency room last week twice for sinus tachycardia and weakness.  It was thought that she may be dehydrated.  Lab work and ECG were unremarkable.  CTA of the chest was negative for PE or any acute pulmonary process.  proBNP was normal.  She was treated with hydration and started on low-dose beta-blocker.  She had an echocardiogram in our office this week that demonstrated ejection fraction of 66%.  There was mild to moderate MR and MS.  There was mild AI without any significant aortic stenosis.  There was mild to moderate TR with a PA pressure of 40.  The left ventricular outflow tract gradient of 49 mmHg with chordal S.A.M.  Stage I diastolic dysfunction.  She had a carotid Doppler in 2018 that showed mild plaque. ZOEY was normal.\par \par Has a history of lung CA treated with left lower lobe lobectomy, and a history of breast cancer.  No known history of heart disease.  Does have history of hypertension.  Eyes any history of smoking, or family history of heart disease.\par \par ECG demonstrates sinus rhythm.  No acute changes.

## 2021-11-19 NOTE — REVIEW OF SYSTEMS
[Feeling Fatigued] : feeling fatigued [Lower Ext Edema] : lower extremity edema [Cough] : cough [Heartburn] : heartburn [Joint Pain] : joint pain [Hip Pain] : hip pain [Dizziness] : dizziness [Easy Bruising] : a tendency for easy bruising [Negative] : Genitourinary [Fever] : no fever [Headache] : no headache [Weight Gain (___ Lbs)] : no recent weight gain [Chills] : no chills [Weight Loss (___ Lbs)] : no recent weight loss [Blurry Vision] : no blurred vision [Seeing Double (Diplopia)] : no diplopia [Wheezing] : no wheezing [Rash] : no rash [Depression] : no depression [Anxiety] : no anxiety [Easy Bleeding] : no tendency for easy bleeding

## 2021-11-19 NOTE — DISCUSSION/SUMMARY
[FreeTextEntry1] : This is an 86-year-old female being treated for hypertension and hyperlipidemia.  She also has a long history of peripheral edema and had been on diuretic.  She now complains of increasing heart rate with weakness.  She probably is on too much vasodilator.  Echocardiogram is unremarkable except for some valvular disease consistent with her age.\par \par I am reducing the losartan to 50 mg once a day and starting torsemide 10 mg once a day.  Discussed treatment of her edema including leg elevation, low-salt diet, and she will try wrapping her legs with Ace bandage.\par \par She will monitor blood pressure and  heart rate at home and call me with the readings.  If all is well I will see her in about 2 to 3 weeks for reevaluation.  I am hoping that with less vasodilator and treating her edema that her tachycardia will improve.  This was discussed with the patient and her daughter and I answered all of their questions.  We did go over her ER records including all the testing.

## 2021-11-22 ENCOUNTER — APPOINTMENT (OUTPATIENT)
Dept: UROGYNECOLOGY | Facility: CLINIC | Age: 86
End: 2021-11-22

## 2021-12-15 ENCOUNTER — APPOINTMENT (OUTPATIENT)
Dept: CARDIOLOGY | Facility: CLINIC | Age: 86
End: 2021-12-15
Payer: MEDICARE

## 2021-12-15 VITALS
OXYGEN SATURATION: 96 % | SYSTOLIC BLOOD PRESSURE: 142 MMHG | BODY MASS INDEX: 26.68 KG/M2 | HEART RATE: 89 BPM | WEIGHT: 145 LBS | HEIGHT: 62 IN | DIASTOLIC BLOOD PRESSURE: 83 MMHG

## 2021-12-15 DIAGNOSIS — E87.1 HYPO-OSMOLALITY AND HYPONATREMIA: ICD-10-CM

## 2021-12-15 PROCEDURE — 99214 OFFICE O/P EST MOD 30 MIN: CPT

## 2021-12-15 PROCEDURE — 93880 EXTRACRANIAL BILAT STUDY: CPT

## 2021-12-15 NOTE — CARDIOLOGY SUMMARY
[de-identified] : 11/21- RST, nonspecific repolarization [de-identified] : 11/21- EF 66^, mild-mod MR/MS, PG 19, mild A!, mild-mod TR, PAP=40, LVOT 49, Choddal CORNEL, I DD [de-identified] : Carotid Doppler 12/18- mild plaque\par ZOEY - Normal

## 2021-12-15 NOTE — DISCUSSION/SUMMARY
[FreeTextEntry1] : The patient's blood pressure is okay.  Her leg edema has improved.  I am going to reduce the torsemide to 5 mg.  She will check blood work today to make sure she is not becoming more hyponatremic.  Otherwise may have to stop the diuretic.  She will continue her other medications as prescribed.\par \par We discussed the importance of low-salt diet and she will try to wrap her legs with Ace bandage to prevent the swelling from getting worse.\par \par If she does have any problems or symptoms she would call me.  If all is well I will see her in 2 months. 29-Jun-2017 01:07

## 2021-12-15 NOTE — HISTORY OF PRESENT ILLNESS
[FreeTextEntry1] : I saw Meg Flanagan in the office today for cardiac follow-up.  She was seen in the emergency room last week twice for sinus tachycardia and weakness.  It was thought that she may be dehydrated.  Lab work and ECG were unremarkable.  CTA of the chest was negative for PE or any acute pulmonary process.  proBNP was normal.  She was treated with hydration and started on low-dose beta-blocker.  She had an echocardiogram in our office this week that demonstrated ejection fraction of 66%.  There was mild to moderate MR and MS.  There was mild AI without any significant aortic stenosis.  There was mild to moderate TR with a PA pressure of 40.  The left ventricular outflow tract gradient of 49 mmHg with chordal S.A.M.  Stage I diastolic dysfunction.  She had a carotid Doppler in 2018 that showed mild plaque. ZOEY was normal.\par \par Has a history of lung CA treated with left lower lobe lobectomy, and a history of breast cancer.  No known history of heart disease.  Does have history of hypertension.  Eyes any history of smoking, or family history of heart disease.\par \par Last visit I reduced her losartan to 50 mg once a day and started torsemide 10 mg once a day.  In the emergency room her sodium had dropped to 130.  She is now taking the water pill every other day and is definitely helping her lymphedema.  Is having no symptoms of tachycardia or dizziness.  She does have a fractured vertebra has been taking Advil 400 mg several times a day.

## 2021-12-16 LAB
ANION GAP SERPL CALC-SCNC: 13 MMOL/L
BUN SERPL-MCNC: 26 MG/DL
CALCIUM SERPL-MCNC: 9.8 MG/DL
CHLORIDE SERPL-SCNC: 103 MMOL/L
CO2 SERPL-SCNC: 25 MMOL/L
CREAT SERPL-MCNC: 0.72 MG/DL
GLUCOSE SERPL-MCNC: 74 MG/DL
POTASSIUM SERPL-SCNC: 6 MMOL/L
SODIUM SERPL-SCNC: 141 MMOL/L

## 2021-12-20 LAB
ANION GAP SERPL CALC-SCNC: 10 MMOL/L
BUN SERPL-MCNC: 23 MG/DL
CALCIUM SERPL-MCNC: 10.3 MG/DL
CHLORIDE SERPL-SCNC: 102 MMOL/L
CO2 SERPL-SCNC: 28 MMOL/L
CREAT SERPL-MCNC: 0.71 MG/DL
GLUCOSE SERPL-MCNC: 91 MG/DL
POTASSIUM SERPL-SCNC: 5.4 MMOL/L
SODIUM SERPL-SCNC: 140 MMOL/L

## 2022-01-23 ENCOUNTER — RX RENEWAL (OUTPATIENT)
Age: 87
End: 2022-01-23

## 2022-02-02 ENCOUNTER — APPOINTMENT (OUTPATIENT)
Dept: INTERNAL MEDICINE | Facility: CLINIC | Age: 87
End: 2022-02-02
Payer: MEDICARE

## 2022-02-02 ENCOUNTER — NON-APPOINTMENT (OUTPATIENT)
Age: 87
End: 2022-02-02

## 2022-02-02 ENCOUNTER — LABORATORY RESULT (OUTPATIENT)
Age: 87
End: 2022-02-02

## 2022-02-02 VITALS
SYSTOLIC BLOOD PRESSURE: 144 MMHG | TEMPERATURE: 97.3 F | RESPIRATION RATE: 14 BRPM | HEART RATE: 95 BPM | HEIGHT: 62 IN | DIASTOLIC BLOOD PRESSURE: 82 MMHG | WEIGHT: 135 LBS | OXYGEN SATURATION: 98 % | BODY MASS INDEX: 24.84 KG/M2

## 2022-02-02 PROCEDURE — 99204 OFFICE O/P NEW MOD 45 MIN: CPT

## 2022-02-02 NOTE — ASSESSMENT
[FreeTextEntry1] : sacral fx and pubic fx- no fall- check bone density- follow up with ortho. \par HTN- follow up with card\par COPD- follow up with pulmon\par Hem Onc- DR. Perez- due to high platelet count\par check labs and bone density\par care coordinator for possible PT in home

## 2022-02-02 NOTE — HISTORY OF PRESENT ILLNESS
[de-identified] : Pt here today to establish care.\par History of HTN- followed by card\par History of Lung Ca- s/p Left VATS in 2015  - lobectomy and lymph dissection for 2 separate adenocarcinoma. \par History of breast - 1999- s/p mastectomy and Tamofen \par History of COPD- followed by pulmonary\par Seeing ortho now due to fx in sacral  and pubic bone

## 2022-02-02 NOTE — PHYSICAL EXAM
[Normal] : normal gait, coordination grossly intact, no focal deficits and deep tendon reflexes were 2+ and symmetric [de-identified] : 2/6 murmur  [de-identified] : difficulty with ambulation- walking with walker sacrum and pubic pain

## 2022-02-03 ENCOUNTER — APPOINTMENT (OUTPATIENT)
Dept: RADIOLOGY | Facility: CLINIC | Age: 87
End: 2022-02-03
Payer: MEDICARE

## 2022-02-03 DIAGNOSIS — D75.839 THROMBOCYTOSIS, UNSPECIFIED: ICD-10-CM

## 2022-02-03 LAB
25(OH)D3 SERPL-MCNC: 146 NG/ML
ALBUMIN SERPL ELPH-MCNC: 4.6 G/DL
ALP BLD-CCNC: 256 U/L
ALT SERPL-CCNC: 11 U/L
ANION GAP SERPL CALC-SCNC: 14 MMOL/L
APPEARANCE: ABNORMAL
AST SERPL-CCNC: 15 U/L
BACTERIA: NEGATIVE
BASOPHILS # BLD AUTO: 0.05 K/UL
BASOPHILS NFR BLD AUTO: 0.5 %
BILIRUB SERPL-MCNC: 0.3 MG/DL
BILIRUBIN URINE: NEGATIVE
BLOOD URINE: NEGATIVE
BUN SERPL-MCNC: 13 MG/DL
CALCIUM SERPL-MCNC: 10.7 MG/DL
CHLORIDE SERPL-SCNC: 101 MMOL/L
CHOLEST SERPL-MCNC: 153 MG/DL
CO2 SERPL-SCNC: 27 MMOL/L
COLOR: YELLOW
CREAT SERPL-MCNC: 0.59 MG/DL
EOSINOPHIL # BLD AUTO: 0.11 K/UL
EOSINOPHIL NFR BLD AUTO: 1.2 %
ESTIMATED AVERAGE GLUCOSE: 114 MG/DL
FOLATE SERPL-MCNC: 10.9 NG/ML
GLUCOSE QUALITATIVE U: NEGATIVE
GLUCOSE SERPL-MCNC: 95 MG/DL
HBA1C MFR BLD HPLC: 5.6 %
HCT VFR BLD CALC: 44.5 %
HDLC SERPL-MCNC: 65 MG/DL
HGB BLD-MCNC: 14.3 G/DL
HYALINE CASTS: 0 /LPF
IMM GRANULOCYTES NFR BLD AUTO: 1 %
KETONES URINE: NEGATIVE
LDLC SERPL CALC-MCNC: 69 MG/DL
LEUKOCYTE ESTERASE URINE: NEGATIVE
LYMPHOCYTES # BLD AUTO: 1.93 K/UL
LYMPHOCYTES NFR BLD AUTO: 20.2 %
MAN DIFF?: NORMAL
MCHC RBC-ENTMCNC: 29.7 PG
MCHC RBC-ENTMCNC: 32.1 GM/DL
MCV RBC AUTO: 92.3 FL
MICROSCOPIC-UA: NORMAL
MONOCYTES # BLD AUTO: 0.73 K/UL
MONOCYTES NFR BLD AUTO: 7.6 %
NEUTROPHILS # BLD AUTO: 6.64 K/UL
NEUTROPHILS NFR BLD AUTO: 69.5 %
NITRITE URINE: NEGATIVE
NONHDLC SERPL-MCNC: 88 MG/DL
PH URINE: 7.5
PLATELET # BLD AUTO: 571 K/UL
POTASSIUM SERPL-SCNC: 4.3 MMOL/L
PROT SERPL-MCNC: 6.5 G/DL
PROTEIN URINE: NEGATIVE
RBC # BLD: 4.82 M/UL
RBC # FLD: 15.2 %
RED BLOOD CELLS URINE: 3 /HPF
SODIUM SERPL-SCNC: 142 MMOL/L
SPECIFIC GRAVITY URINE: 1.01
SQUAMOUS EPITHELIAL CELLS: 3 /HPF
TRIGL SERPL-MCNC: 94 MG/DL
TSH SERPL-ACNC: 1.89 UIU/ML
UROBILINOGEN URINE: NORMAL
VIT B12 SERPL-MCNC: 494 PG/ML
WBC # FLD AUTO: 9.56 K/UL
WHITE BLOOD CELLS URINE: 5 /HPF

## 2022-02-03 PROCEDURE — 77080 DXA BONE DENSITY AXIAL: CPT

## 2022-02-04 LAB
CALCIUM SERPL-MCNC: 10.4 MG/DL
GGT SERPL-CCNC: 16 U/L
PARATHYROID HORMONE INTACT: 28 PG/ML

## 2022-02-08 DIAGNOSIS — S32.509A UNSPECIFIED FRACTURE OF UNSPECIFIED PUBIS, INITIAL ENCOUNTER FOR CLOSED FRACTURE: ICD-10-CM

## 2022-02-08 LAB
ALBUMIN MFR SERPL ELPH: 58.8 %
ALBUMIN SERPL-MCNC: 4 G/DL
ALBUMIN/GLOB SERPL: 1.4 RATIO
ALPHA1 GLOB MFR SERPL ELPH: 5.7 %
ALPHA1 GLOB SERPL ELPH-MCNC: 0.4 G/DL
ALPHA2 GLOB MFR SERPL ELPH: 13 %
ALPHA2 GLOB SERPL ELPH-MCNC: 0.9 G/DL
B-GLOBULIN MFR SERPL ELPH: 11.6 %
B-GLOBULIN SERPL ELPH-MCNC: 0.8 G/DL
DEPRECATED KAPPA LC FREE/LAMBDA SER: 13.56 RATIO
GAMMA GLOB FLD ELPH-MCNC: 0.7 G/DL
GAMMA GLOB MFR SERPL ELPH: 10.9 %
IGA SER QL IEP: 212 MG/DL
IGG SER QL IEP: 537 MG/DL
IGM SER QL IEP: 69 MG/DL
INTERPRETATION SERPL IEP-IMP: NORMAL
KAPPA LC CSF-MCNC: 0.52 MG/DL
KAPPA LC SERPL-MCNC: 7.05 MG/DL
M PROTEIN MFR SERPL ELPH: 2.8 %
M PROTEIN SPEC IFE-MCNC: NORMAL
MONOCLON BAND OBS SERPL: 0.2 G/DL
PROT SERPL-MCNC: 6.8 G/DL
PROT SERPL-MCNC: 6.8 G/DL

## 2022-02-14 ENCOUNTER — APPOINTMENT (OUTPATIENT)
Dept: CARDIOLOGY | Facility: CLINIC | Age: 87
End: 2022-02-14
Payer: MEDICARE

## 2022-02-14 VITALS
DIASTOLIC BLOOD PRESSURE: 84 MMHG | HEIGHT: 62 IN | BODY MASS INDEX: 25.21 KG/M2 | HEART RATE: 87 BPM | SYSTOLIC BLOOD PRESSURE: 143 MMHG | WEIGHT: 137 LBS | OXYGEN SATURATION: 97 %

## 2022-02-14 PROCEDURE — 99214 OFFICE O/P EST MOD 30 MIN: CPT

## 2022-02-14 NOTE — DISCUSSION/SUMMARY
[FreeTextEntry1] : The patient is doing well.  Her blood pressure is well controlled and her blood work is excellent.  She does have peripheral edema which we discussed.  She needs to try to use her compression stockings.  We discussed low-salt diet, leg elevation, and small dose of diuretic.\par \par Discussed the results of her carotid Doppler.  We discussed her lifestyle, answered all of her questions.  She will stay on her present medication.  If all is well I would see her in 3 to 4 months.

## 2022-02-14 NOTE — HISTORY OF PRESENT ILLNESS
[FreeTextEntry1] : I saw Meg Flanagan in the office today for cardiac follow-up.  She was seen in the emergency room 11/21 twice for sinus tachycardia and weakness.  It was thought that she may be dehydrated.  Lab work and ECG were unremarkable.  CTA of the chest was negative for PE or any acute pulmonary process.  proBNP was normal.  She was treated with hydration and started on low-dose beta-blocker.  She had an echocardiogram in our office  11/21that demonstrated ejection fraction of 66%.  There was mild to moderate MR and MS.  There was mild AI without any significant aortic stenosis.  There was mild to moderate TR with a PA pressure of 40.  The left ventricular outflow tract gradient of 49 mmHg with chordal S.A.M.  Stage I diastolic dysfunction.  She had a carotid Doppler 12/21 that showed mild plaque. ZOEY was normal.\par \par Has a history of lung CA treated with left lower lobe lobectomy, and a history of breast cancer.  No known history of heart disease.  Does have history of hypertension.  Eyes any history of smoking, or family history of heart disease.\par \par Last visit I reduced her losartan to 50 mg once a day and started torsemide 10 mg once a day.  In the emergency room her sodium had dropped to 130.  She is now taking the water pill every other day and is definitely helping her lymphedema.  Is having no symptoms of tachycardia or dizziness.  She does have a fractured vertebra has been taking Advil 400 mg several times a day.  Work 2/22 demonstrated a sodium of 140.  Normal CMP and TSH.  Cholesterol 153, triglycerides 94, HDL 65, and LDL 69.  A1c was 5.6.\par \par Has significant osteoporosis and has a compression fracture of the spine and pelvis.  She is following with Dr. Sagastume.

## 2022-02-14 NOTE — PHYSICAL EXAM
[Not Palpable] : not palpable [Normal Rate] : normal [Normal S1] : normal S1 [Normal S2] : normal S2 [___ +] : bilateral [unfilled]U+ pretibial pitting edema [II] : a grade 2 [2+] : left 2+ [No Abnormalities] : the abdominal aorta was not enlarged and no bruit was heard [Soft] : abdomen soft [Non Tender] : non-tender [Normal Gait] : normal gait [No Cyanosis] : no cyanosis [No Clubbing] : no clubbing [Normal] : alert and oriented, normal memory [S3] : no S3 [S4] : no S4 [Right Carotid Bruit] : no bruit heard over the right carotid [Left Carotid Bruit] : no bruit heard over the left carotid [Right Femoral Bruit] : no bruit heard over the right femoral artery [Left Femoral Bruit] : no bruit heard over the left femoral artery

## 2022-02-14 NOTE — CARDIOLOGY SUMMARY
[de-identified] : 11/21- RST, nonspecific repolarization [de-identified] : 11/21- EF 66^, mild-mod MR/MS, PG 19, mild A!, mild-mod TR, PAP=40, LVOT 49, Choddal CORNEL, I DD [de-identified] : Carotid Doppler 12/21- mild plaque\par ZOEY - Normal

## 2022-02-23 ENCOUNTER — APPOINTMENT (OUTPATIENT)
Dept: PULMONOLOGY | Facility: CLINIC | Age: 87
End: 2022-02-23

## 2022-02-24 LAB
ALBUPE: 15.9 %
ALP BLD-CCNC: 260 IU/L
ALP BONE CFR SERPL: 88 %
ALP INTEST CFR SERPL: 0 %
ALP LIVER CFR SERPL: 12 %
ALPHA1UPE: 41.1 %
ALPHA2UPE: 20.8 %
BETAUPE: 13.7 %
CREAT 24H UR-MCNC: NORMAL G/24 H
CREATININE UR (MAYO): 71 MG/DL
GAMMAUPE: 8.5 %
IGA 24H UR QL IFE: NORMAL
KAPPA LC 24H UR QL: PRESENT
PROT PATTERN 24H UR ELPH-IMP: NORMAL
PROT UR-MCNC: 11 MG/DL
PROT UR-MCNC: 11 MG/DL
SPECIMEN VOL 24H UR: NORMAL ML

## 2022-03-01 RX ORDER — DENOSUMAB 60 MG/ML
60 INJECTION SUBCUTANEOUS
Qty: 1 | Refills: 1 | Status: ACTIVE | OUTPATIENT
Start: 2022-03-01

## 2022-03-10 ENCOUNTER — RX RENEWAL (OUTPATIENT)
Age: 87
End: 2022-03-10

## 2022-03-14 ENCOUNTER — RX RENEWAL (OUTPATIENT)
Age: 87
End: 2022-03-14

## 2022-03-15 ENCOUNTER — RESULT CHARGE (OUTPATIENT)
Age: 87
End: 2022-03-15

## 2022-03-16 ENCOUNTER — APPOINTMENT (OUTPATIENT)
Dept: PULMONOLOGY | Facility: CLINIC | Age: 87
End: 2022-03-16
Payer: MEDICARE

## 2022-03-16 VITALS — OXYGEN SATURATION: 96 % | HEART RATE: 92 BPM | SYSTOLIC BLOOD PRESSURE: 134 MMHG | DIASTOLIC BLOOD PRESSURE: 84 MMHG

## 2022-03-16 PROCEDURE — 99213 OFFICE O/P EST LOW 20 MIN: CPT | Mod: 25

## 2022-03-16 PROCEDURE — 95012 NITRIC OXIDE EXP GAS DETER: CPT

## 2022-03-16 PROCEDURE — ZZZZZ: CPT

## 2022-03-16 PROCEDURE — 94729 DIFFUSING CAPACITY: CPT

## 2022-03-16 PROCEDURE — 94727 GAS DIL/WSHOT DETER LNG VOL: CPT

## 2022-03-16 PROCEDURE — 94010 BREATHING CAPACITY TEST: CPT

## 2022-03-16 NOTE — HISTORY OF PRESENT ILLNESS
[TextBox_4] : Pulm status unchanged\par Had a T5 Fracture and a hip fracture this past year\par Supposed to get Prolia - insurance is denying\par \par

## 2022-03-16 NOTE — ASSESSMENT
[FreeTextEntry1] : Asthma appears stable at this point will reduce Qvar to 3 puffs twice daily to reduce steroid exposure

## 2022-04-11 ENCOUNTER — APPOINTMENT (OUTPATIENT)
Dept: UROGYNECOLOGY | Facility: CLINIC | Age: 87
End: 2022-04-11
Payer: MEDICARE

## 2022-04-11 DIAGNOSIS — N89.8 OTHER SPECIFIED NONINFLAMMATORY DISORDERS OF VAGINA: ICD-10-CM

## 2022-04-11 PROCEDURE — 99213 OFFICE O/P EST LOW 20 MIN: CPT

## 2022-04-11 RX ORDER — TORSEMIDE 10 MG/1
10 TABLET ORAL
Qty: 30 | Refills: 0 | Status: DISCONTINUED | COMMUNITY
Start: 2021-11-19

## 2022-04-11 RX ORDER — NIFEDIPINE 30 MG/1
30 TABLET, FILM COATED, EXTENDED RELEASE ORAL
Qty: 90 | Refills: 0 | Status: DISCONTINUED | COMMUNITY
Start: 2021-03-30

## 2022-04-11 NOTE — DISCUSSION/SUMMARY
[FreeTextEntry1] : Pelvic prolapse:\par -Continue with Gellhorn LS # 3.\par -Precaution and instructions were reviewed.\par \par Vaginal atrophy:\par -Continue Estrace cream \par \par Constipation:\par -Advised avoid constipation/straining w/ daily fiber/fluid intake and stool softeners daily PRN\par \par Follow up in 3 months or sooner.  If pt have any problems/concern to call office.  PT aware and agrees.\par All questions answered to the patient's apparent satisfaction. \par

## 2022-04-11 NOTE — PHYSICAL EXAM
[No Acute Distress] : in no acute distress [Well developed] : well developed [Well Nourished] : ~L well nourished [Good Hygeine] : demonstrates good hygeine [Oriented x3] : oriented to person, place, and time [Normal Memory] : ~T memory was ~L unimpaired [Normal Mood/Affect] : mood and affect are normal [Warm and Dry] : was warm and dry to touch [No Lesions] : no lesions were seen on the external genitalia [Vulvar Atrophy] : vulvar atrophy [Labia Majora] : were normal [Labia Minora] : were normal [Atrophy] : atrophy [Erythematous] : erythema [Cystocele] : a cystocele [Discharge] : a  ~M vaginal discharge was present [Scant] : scant [Ananth] : yellow [Thin] : thin [No Bleeding] : there was no active vaginal bleeding [Soft] :  the cervix was soft [Anxiety] : patient is not anxious [Tenderness] : ~T no ~M abdominal tenderness observed [Distended] : not distended [de-identified] : Gait steady with walker.

## 2022-04-11 NOTE — HISTORY OF PRESENT ILLNESS
[FreeTextEntry1] : Meg, 87y/o presents to the office for follow up pelvic prolapse.  Last seen in October 2021 and was fitted with Gellhorn LS # 3 from a SS # 2 3/4 as it wasn't supportive enough.  PT is doing well with it and happy with support.  Urinating with UUI and moving BM without problems.  Pt also using Estradiol cream as Rx.\par Pt wasn't seen sooner as she had Fx her vertebrae and had a pelvis Fx.  She is healing well.  Now ambulates with a walker and doing much better.\par \par \par

## 2022-04-11 NOTE — PROCEDURE
[Good Fit] : fits well [Pessary Inserted] : inserted [Pessary Washed] : washed [None] : no bleeding [Medication Review] : Medicaiton Review: Patient verbalizes understanding of risks and benefits [Bowel Management] : Bowel Management: patient verbalizes understanding of daily dietary fiber intake [Erosion] : no evidence of erosion [Erythema] : no erythema [Discharge] : no vaginal discharge [Infection] : no evidence of infection [FreeTextEntry1] : Rohit WYATT #  3 [FreeTextEntry3] : Continue estrogen use  [FreeTextEntry4] : Advised avoid constipation/straining w/ daily fiber/fluid intake and stool softeners daily PRN [FreeTextEntry8] : Continue daily pericare.

## 2022-05-08 ENCOUNTER — INPATIENT (INPATIENT)
Facility: HOSPITAL | Age: 87
LOS: 4 days | Discharge: NURSING, REHAB OR LONG-TERM CARE | DRG: 481 | End: 2022-05-13
Attending: HOSPITALIST | Admitting: HOSPITALIST
Payer: MEDICARE

## 2022-05-08 ENCOUNTER — TRANSCRIPTION ENCOUNTER (OUTPATIENT)
Age: 87
End: 2022-05-08

## 2022-05-08 VITALS
DIASTOLIC BLOOD PRESSURE: 79 MMHG | TEMPERATURE: 97 F | HEART RATE: 87 BPM | RESPIRATION RATE: 18 BRPM | WEIGHT: 134.04 LBS | SYSTOLIC BLOOD PRESSURE: 122 MMHG | OXYGEN SATURATION: 95 % | HEIGHT: 62 IN

## 2022-05-08 DIAGNOSIS — Z98.89 OTHER SPECIFIED POSTPROCEDURAL STATES: Chronic | ICD-10-CM

## 2022-05-08 DIAGNOSIS — S72.001A FRACTURE OF UNSPECIFIED PART OF NECK OF RIGHT FEMUR, INITIAL ENCOUNTER FOR CLOSED FRACTURE: ICD-10-CM

## 2022-05-08 DIAGNOSIS — H26.9 UNSPECIFIED CATARACT: Chronic | ICD-10-CM

## 2022-05-08 LAB
ALBUMIN SERPL ELPH-MCNC: 3.6 G/DL — SIGNIFICANT CHANGE UP (ref 3.3–5)
ALP SERPL-CCNC: 95 U/L — SIGNIFICANT CHANGE UP (ref 30–120)
ALT FLD-CCNC: 26 U/L DA — SIGNIFICANT CHANGE UP (ref 10–60)
ANION GAP SERPL CALC-SCNC: 8 MMOL/L — SIGNIFICANT CHANGE UP (ref 5–17)
APTT BLD: 25.1 SEC — LOW (ref 27.5–35.5)
AST SERPL-CCNC: 29 U/L — SIGNIFICANT CHANGE UP (ref 10–40)
BASOPHILS # BLD AUTO: 0.08 K/UL — SIGNIFICANT CHANGE UP (ref 0–0.2)
BASOPHILS NFR BLD AUTO: 0.7 % — SIGNIFICANT CHANGE UP (ref 0–2)
BILIRUB SERPL-MCNC: 0.4 MG/DL — SIGNIFICANT CHANGE UP (ref 0.2–1.2)
BLD GP AB SCN SERPL QL: SIGNIFICANT CHANGE UP
BUN SERPL-MCNC: 30 MG/DL — HIGH (ref 7–23)
CALCIUM SERPL-MCNC: 9.7 MG/DL — SIGNIFICANT CHANGE UP (ref 8.4–10.5)
CHLORIDE SERPL-SCNC: 103 MMOL/L — SIGNIFICANT CHANGE UP (ref 96–108)
CO2 SERPL-SCNC: 26 MMOL/L — SIGNIFICANT CHANGE UP (ref 22–31)
CREAT SERPL-MCNC: 1 MG/DL — SIGNIFICANT CHANGE UP (ref 0.5–1.3)
EGFR: 55 ML/MIN/1.73M2 — LOW
EOSINOPHIL # BLD AUTO: 0.16 K/UL — SIGNIFICANT CHANGE UP (ref 0–0.5)
EOSINOPHIL NFR BLD AUTO: 1.3 % — SIGNIFICANT CHANGE UP (ref 0–6)
GLUCOSE SERPL-MCNC: 115 MG/DL — HIGH (ref 70–99)
HCT VFR BLD CALC: 40.2 % — SIGNIFICANT CHANGE UP (ref 34.5–45)
HGB BLD-MCNC: 13.1 G/DL — SIGNIFICANT CHANGE UP (ref 11.5–15.5)
IMM GRANULOCYTES NFR BLD AUTO: 1 % — SIGNIFICANT CHANGE UP (ref 0–1.5)
INR BLD: 1.03 RATIO — SIGNIFICANT CHANGE UP (ref 0.88–1.16)
LYMPHOCYTES # BLD AUTO: 2.91 K/UL — SIGNIFICANT CHANGE UP (ref 1–3.3)
LYMPHOCYTES # BLD AUTO: 23.8 % — SIGNIFICANT CHANGE UP (ref 13–44)
MCHC RBC-ENTMCNC: 29.7 PG — SIGNIFICANT CHANGE UP (ref 27–34)
MCHC RBC-ENTMCNC: 32.6 GM/DL — SIGNIFICANT CHANGE UP (ref 32–36)
MCV RBC AUTO: 91.2 FL — SIGNIFICANT CHANGE UP (ref 80–100)
MONOCYTES # BLD AUTO: 0.99 K/UL — HIGH (ref 0–0.9)
MONOCYTES NFR BLD AUTO: 8.1 % — SIGNIFICANT CHANGE UP (ref 2–14)
NEUTROPHILS # BLD AUTO: 7.96 K/UL — HIGH (ref 1.8–7.4)
NEUTROPHILS NFR BLD AUTO: 65.1 % — SIGNIFICANT CHANGE UP (ref 43–77)
NRBC # BLD: 0 /100 WBCS — SIGNIFICANT CHANGE UP (ref 0–0)
NT-PROBNP SERPL-SCNC: 234 PG/ML — SIGNIFICANT CHANGE UP (ref 0–450)
PLATELET # BLD AUTO: 540 K/UL — HIGH (ref 150–400)
POTASSIUM SERPL-MCNC: 4.3 MMOL/L — SIGNIFICANT CHANGE UP (ref 3.5–5.3)
POTASSIUM SERPL-SCNC: 4.3 MMOL/L — SIGNIFICANT CHANGE UP (ref 3.5–5.3)
PROT SERPL-MCNC: 6.5 G/DL — SIGNIFICANT CHANGE UP (ref 6–8.3)
PROTHROM AB SERPL-ACNC: 12.2 SEC — SIGNIFICANT CHANGE UP (ref 10.5–13.4)
RBC # BLD: 4.41 M/UL — SIGNIFICANT CHANGE UP (ref 3.8–5.2)
RBC # FLD: 15.5 % — HIGH (ref 10.3–14.5)
SARS-COV-2 RNA SPEC QL NAA+PROBE: SIGNIFICANT CHANGE UP
SODIUM SERPL-SCNC: 137 MMOL/L — SIGNIFICANT CHANGE UP (ref 135–145)
WBC # BLD: 12.22 K/UL — HIGH (ref 3.8–10.5)
WBC # FLD AUTO: 12.22 K/UL — HIGH (ref 3.8–10.5)

## 2022-05-08 PROCEDURE — 99053 MED SERV 10PM-8AM 24 HR FAC: CPT

## 2022-05-08 PROCEDURE — 99223 1ST HOSP IP/OBS HIGH 75: CPT

## 2022-05-08 PROCEDURE — 99285 EMERGENCY DEPT VISIT HI MDM: CPT

## 2022-05-08 PROCEDURE — 93010 ELECTROCARDIOGRAM REPORT: CPT

## 2022-05-08 PROCEDURE — 73502 X-RAY EXAM HIP UNI 2-3 VIEWS: CPT | Mod: 26,RT

## 2022-05-08 PROCEDURE — 93306 TTE W/DOPPLER COMPLETE: CPT | Mod: 26

## 2022-05-08 PROCEDURE — 71045 X-RAY EXAM CHEST 1 VIEW: CPT | Mod: 26

## 2022-05-08 RX ORDER — ATORVASTATIN CALCIUM 80 MG/1
10 TABLET, FILM COATED ORAL AT BEDTIME
Refills: 0 | Status: DISCONTINUED | OUTPATIENT
Start: 2022-05-08 | End: 2022-05-09

## 2022-05-08 RX ORDER — MORPHINE SULFATE 50 MG/1
4 CAPSULE, EXTENDED RELEASE ORAL EVERY 4 HOURS
Refills: 0 | Status: DISCONTINUED | OUTPATIENT
Start: 2022-05-08 | End: 2022-05-08

## 2022-05-08 RX ORDER — ONDANSETRON 8 MG/1
4 TABLET, FILM COATED ORAL EVERY 8 HOURS
Refills: 0 | Status: DISCONTINUED | OUTPATIENT
Start: 2022-05-08 | End: 2022-05-09

## 2022-05-08 RX ORDER — ENOXAPARIN SODIUM 100 MG/ML
40 INJECTION SUBCUTANEOUS ONCE
Refills: 0 | Status: COMPLETED | OUTPATIENT
Start: 2022-05-08 | End: 2022-05-08

## 2022-05-08 RX ORDER — MORPHINE SULFATE 50 MG/1
2 CAPSULE, EXTENDED RELEASE ORAL ONCE
Refills: 0 | Status: DISCONTINUED | OUTPATIENT
Start: 2022-05-08 | End: 2022-05-08

## 2022-05-08 RX ORDER — PANTOPRAZOLE SODIUM 20 MG/1
40 TABLET, DELAYED RELEASE ORAL
Refills: 0 | Status: DISCONTINUED | OUTPATIENT
Start: 2022-05-08 | End: 2022-05-09

## 2022-05-08 RX ORDER — SODIUM CHLORIDE 9 MG/ML
500 INJECTION, SOLUTION INTRAVENOUS ONCE
Refills: 0 | Status: COMPLETED | OUTPATIENT
Start: 2022-05-08 | End: 2022-05-08

## 2022-05-08 RX ORDER — NIFEDIPINE 30 MG
30 TABLET, EXTENDED RELEASE 24 HR ORAL DAILY
Refills: 0 | Status: DISCONTINUED | OUTPATIENT
Start: 2022-05-08 | End: 2022-05-09

## 2022-05-08 RX ORDER — ASCORBIC ACID 60 MG
1 TABLET,CHEWABLE ORAL
Qty: 0 | Refills: 0 | DISCHARGE

## 2022-05-08 RX ORDER — TRAMADOL HYDROCHLORIDE 50 MG/1
25 TABLET ORAL EVERY 8 HOURS
Refills: 0 | Status: DISCONTINUED | OUTPATIENT
Start: 2022-05-08 | End: 2022-05-09

## 2022-05-08 RX ORDER — ALBUTEROL 90 UG/1
2 AEROSOL, METERED ORAL EVERY 6 HOURS
Refills: 0 | Status: DISCONTINUED | OUTPATIENT
Start: 2022-05-08 | End: 2022-05-13

## 2022-05-08 RX ORDER — VERAPAMIL HCL 240 MG
1 CAPSULE, EXTENDED RELEASE PELLETS 24 HR ORAL
Qty: 0 | Refills: 0 | DISCHARGE

## 2022-05-08 RX ORDER — LANOLIN ALCOHOL/MO/W.PET/CERES
3 CREAM (GRAM) TOPICAL AT BEDTIME
Refills: 0 | Status: DISCONTINUED | OUTPATIENT
Start: 2022-05-08 | End: 2022-05-13

## 2022-05-08 RX ORDER — MONTELUKAST 4 MG/1
1 TABLET, CHEWABLE ORAL
Qty: 0 | Refills: 0 | DISCHARGE

## 2022-05-08 RX ORDER — TIOTROPIUM BROMIDE 18 UG/1
1 CAPSULE ORAL; RESPIRATORY (INHALATION) DAILY
Refills: 0 | Status: DISCONTINUED | OUTPATIENT
Start: 2022-05-08 | End: 2022-05-09

## 2022-05-08 RX ORDER — METOPROLOL TARTRATE 50 MG
25 TABLET ORAL DAILY
Refills: 0 | Status: DISCONTINUED | OUTPATIENT
Start: 2022-05-08 | End: 2022-05-09

## 2022-05-08 RX ORDER — ACETAMINOPHEN 500 MG
650 TABLET ORAL EVERY 6 HOURS
Refills: 0 | Status: DISCONTINUED | OUTPATIENT
Start: 2022-05-08 | End: 2022-05-09

## 2022-05-08 RX ORDER — MONTELUKAST 4 MG/1
10 TABLET, CHEWABLE ORAL DAILY
Refills: 0 | Status: DISCONTINUED | OUTPATIENT
Start: 2022-05-08 | End: 2022-05-13

## 2022-05-08 RX ORDER — MORPHINE SULFATE 50 MG/1
2 CAPSULE, EXTENDED RELEASE ORAL EVERY 4 HOURS
Refills: 0 | Status: DISCONTINUED | OUTPATIENT
Start: 2022-05-08 | End: 2022-05-08

## 2022-05-08 RX ORDER — MOMETASONE FUROATE 220 UG/1
1 INHALANT RESPIRATORY (INHALATION) DAILY
Refills: 0 | Status: DISCONTINUED | OUTPATIENT
Start: 2022-05-08 | End: 2022-05-12

## 2022-05-08 RX ADMIN — TRAMADOL HYDROCHLORIDE 25 MILLIGRAM(S): 50 TABLET ORAL at 09:06

## 2022-05-08 RX ADMIN — TRAMADOL HYDROCHLORIDE 25 MILLIGRAM(S): 50 TABLET ORAL at 23:11

## 2022-05-08 RX ADMIN — PANTOPRAZOLE SODIUM 40 MILLIGRAM(S): 20 TABLET, DELAYED RELEASE ORAL at 05:42

## 2022-05-08 RX ADMIN — TIOTROPIUM BROMIDE 1 CAPSULE(S): 18 CAPSULE ORAL; RESPIRATORY (INHALATION) at 05:42

## 2022-05-08 RX ADMIN — MORPHINE SULFATE 2 MILLIGRAM(S): 50 CAPSULE, EXTENDED RELEASE ORAL at 02:15

## 2022-05-08 RX ADMIN — MOMETASONE FUROATE 1 PUFF(S): 220 INHALANT RESPIRATORY (INHALATION) at 05:43

## 2022-05-08 RX ADMIN — MORPHINE SULFATE 2 MILLIGRAM(S): 50 CAPSULE, EXTENDED RELEASE ORAL at 01:26

## 2022-05-08 RX ADMIN — MONTELUKAST 10 MILLIGRAM(S): 4 TABLET, CHEWABLE ORAL at 13:38

## 2022-05-08 RX ADMIN — TRAMADOL HYDROCHLORIDE 25 MILLIGRAM(S): 50 TABLET ORAL at 09:45

## 2022-05-08 RX ADMIN — ENOXAPARIN SODIUM 40 MILLIGRAM(S): 100 INJECTION SUBCUTANEOUS at 05:41

## 2022-05-08 RX ADMIN — ATORVASTATIN CALCIUM 10 MILLIGRAM(S): 80 TABLET, FILM COATED ORAL at 21:26

## 2022-05-08 RX ADMIN — SODIUM CHLORIDE 500 MILLILITER(S): 9 INJECTION, SOLUTION INTRAVENOUS at 04:29

## 2022-05-08 RX ADMIN — MORPHINE SULFATE 4 MILLIGRAM(S): 50 CAPSULE, EXTENDED RELEASE ORAL at 03:48

## 2022-05-08 RX ADMIN — Medication 30 MILLIGRAM(S): at 05:42

## 2022-05-08 RX ADMIN — MORPHINE SULFATE 2 MILLIGRAM(S): 50 CAPSULE, EXTENDED RELEASE ORAL at 01:56

## 2022-05-08 RX ADMIN — TRAMADOL HYDROCHLORIDE 25 MILLIGRAM(S): 50 TABLET ORAL at 23:45

## 2022-05-08 RX ADMIN — Medication 25 MILLIGRAM(S): at 05:42

## 2022-05-08 NOTE — H&P ADULT - NSHPPHYSICALEXAM_GEN_ALL_CORE
PHYSICAL EXAM:  Vital Signs Last 24 Hrs  T(C): 36.3 (08 May 2022 00:10), Max: 36.3 (08 May 2022 00:10)  T(F): 97.4 (08 May 2022 00:10), Max: 97.4 (08 May 2022 00:10)  HR: 87 (08 May 2022 01:25) (87 - 87)  BP: 133/76 (08 May 2022 01:25) (122/79 - 133/76)  BP(mean): --  RR: 18 (08 May 2022 01:25) (18 - 18)  SpO2: 96% (08 May 2022 01:25) (95% - 96%)    GENERAL:     elderly pleasant female in NAD  HEAD:     atraumatic  EYES:     EOMI, conjunctiva and sclera clear  NECK:     supple, no JVD  RESPIRATORY:     clear to auscultation bilaterally, no rales or rhonchi or wheezing or rubs  CARDIOVASCULAR:     regular rate and rhythm, ii/vi soft blowing systolic murmur loudest at LLSB  GASTROINTESTINAL:     soft, nontender, nondistended, no hepatosplenomegaly palpated, bowel sounds present  EXTREMITIES:     2+ pitting BLE, right arm in compression sleeve  MUSCULOSKELETAL:     right leg shortened and externally rotated  NERVOUS SYSTEM:     able to wiggle toes, no sensory deficits  SKIN:     no rashes or lesions  PSYCH:     appropriate, alert and orientated x3, good concentration

## 2022-05-08 NOTE — CHART NOTE - NSCHARTNOTEFT_GEN_A_CORE
patient being seen for hip fx. patient seen post tte and complains of right hip pain .     vitals reviewed  labs reviewed    a/p  86F with hx of lung CA (s/p CELESTE resection) and breast CA (s/p bilateral mastectomies and tamoxifen), Escobar's esophagus, HTN, asthma and COPD, PAD who presents with right hip pain after a fall.   Found to have a right intertrochanteric fracture.  Patient is npo for orif tomorrow     Right intertrochanteric fracture - follow up tte  - patient is medically optimizied, please await cardio clearance   - pulm following   - leukocytosis likely reactive -> monitor  - anti-tussive (cough exacerbates her pain)    HTN  - cont with metoprolol XL and nifedipine ER  - hold ASA pending surgery  - hold losartan pending surgery  - cont with atorvastatin    Lymphedema  - hold torsemide for now (will need to clarify dosage from [facility] or Dr. Rooney's office since patient cannot recall her dosage)  - edema is stable     Asthma/COPD  - cont with monteleukast  - cont with albuterol inh  - cont with spiriva     Preventive measures  - lovenox x1 for tonight, hold AC starting tomorrow night for possible surgery on Monday  - SCD    pain - tramadol    dispo - danniley needs jessi post surgery. lives alone

## 2022-05-08 NOTE — ED PROVIDER NOTE - OBJECTIVE STATEMENT
Patient was going to sit down. Was not positioned properly over the chair, and fell onto her right side, hurting her right hip. No other injury or pain. States she did not fall very hard. No head injury or neck pain

## 2022-05-08 NOTE — PATIENT PROFILE ADULT - NSTOBACCONEVERSMOKERY/N_GEN_A
Initial Clinical Review    Elective 63403 surgical procedure  Age/Sex: 77 y o  male  Surgery Date: 12/4/2019  Procedure: Operative procedure  Anesthesia: Choice  Admission Orders: Date/Time/Statement:   No orders of the defined types were placed in this encounter  Vital Signs: /76 (BP Location: Right arm)   Pulse 94   Temp 99 7 °F (37 6 °C) (Tympanic)   Resp 18   Ht 5' 7" (1 702 m)   Wt 83 5 kg (184 lb)   SpO2 96%   BMI 28 82 kg/m²   Diet: Regular  Mobility: UP with asssist, ambulate with walker  DVT Prophylaxis: Up with assist, SCDS, thigh high compression stocking  Medications/Pain Control: Roxicodone pox5  Scheduled Medications:    Medications:  aspirin 325 mg Oral BID   celecoxib 200 mg Oral Daily   docusate sodium 100 mg Oral BID   ezetimibe 10 mg-pravastatin 40 mg combo dose  Oral HS   ferrous sulfate 325 mg Oral Daily With Breakfast   lisinopril 40 mg Oral Daily   senna 1 tablet Oral Daily     Continuous IV Infusions:    lactated ringers 100 mL/hr Intravenous Continuous   lactated ringers 125 mL/hr Intravenous Continuous     PRN Meds:    acetaminophen 650 mg Oral Q6H PRN   hydrALAZINE 5 mg Intravenous Q6H PRN   HYDROmorphone 0 5 mg Intravenous Q2H PRN   ketorolac 15 mg Intravenous Q6H PRN   ondansetron 4 mg Intravenous Q8H PRN   oxyCODONE 10 mg Oral Q4H PRN   oxyCODONE 5 mg Oral Q4H PRN         Network Utilization Review Department  [email or web address]  ATTENTION: Please call with any questions or concerns to [phone] and carefully listen to the prompts so that you are directed to the right person  All voicemails are confidential   Jose Guadalupe Lutz all requests for admission clinical reviews, approved or denied determinations and any other requests to dedicated fax number below belonging to the campus where the patient is receiving treatment   List of dedicated fax numbers for the Facilities:  FACILITY NAME UR FAX NUMBER   ADMISSION DENIALS (Administrative/Medical Necessity) [address] [facility] (Maternity/NICU/Pediatrics) [facility] [phone]   Umang Szymanski [phone]   Jefferson Gardner [phone]   [address] [place] [address] [phone]   Raghu Babcocker [address] [phone] No

## 2022-05-08 NOTE — H&P ADULT - HISTORY OF PRESENT ILLNESS
86F with hx of lung CA (s/p CELESTE resection) and breast CA (s/p bilateral mastectomies and tamoxifen), Escobar's esophagus, HTN, asthma and COPD, PAD who presents with right hip pain after a fall.  Patient was in her usual state of health today when she attempted to sit on a chair.  Patient slid out of her chair and fell on her right side.  No LOC or head trauma.  No chest pain or SOB prior or after fall.  Patient came here for further evaluation where she was discovered to have a right intertrochanteric fracture.  Prior to fall, patient was in her usual state of health which included a chronic cough, chronic BLE edema (controlled on torsemide), chronic CORDERO when walking with a walker.  No new or acute symptoms.     86F with hx of lung CA (s/p CELESTE resection) and breast CA (s/p bilateral mastectomies and tamoxifen), Escobar's esophagus, HTN, asthma and COPD, PAD who presents with right hip pain after a fall.  Patient was in her usual state of health today and was at a wedding when she attempted to sit on a chair and fell.  Patient slid out of her chair and fell on her right side.  No LOC or head trauma.  No chest pain or SOB prior or after fall.  Patient came here for further evaluation where she was discovered to have a right intertrochanteric fracture.  Prior to fall, patient was in her usual state of health which included a chronic cough, chronic BLE edema (controlled on torsemide), chronic CORDERO when walking with a walker.  No new or acute symptoms.

## 2022-05-08 NOTE — H&P ADULT - ASSESSMENT
86F with hx of lung CA (s/p CELESTE resection) and breast CA (s/p bilateral mastectomies and tamoxifen), Escobar's esophagus, HTN, asthma and COPD, PAD who presents with right hip pain after a fall.   Found to have a right intertrochanteric fracture.      Right intertrochanteric fracture  - admitted to medicine  - Dr. Almaguer consulted (likely surgery on Monday)  - regarding pre-op eval, patient would benefit from an echo prior to surgery (was supposed to have an outpatient TTE as per notes for a murmur but patient does not believe she has had it) -> ordered TTE  - cardiology consult  - would also benefit from a pulmonary consult given her hx of COPD and her multiple COPD/asthma meds  - DASH diet for now and NPO p MN (on SUNDAY)  - pain control  - PT/OT after surgery  - leukocytosis likely reactive -> monitor  - anti-tussive (cough exacerbates her pain)    HTN  - cont with metoprolol XL and nifedipine ER  - hold ASA pending surgery  - hold losartan pending surgery  - cont with atorvastatin    Lymphedema  - hold torsemide for now (will need to clarify dosage from [facility] or Dr. Rooney's office since patient cannot recall her dosage)    Asthma/COPD  - cont with monteleukast  - cont with QVAR or equivalent  - cont with albuterol inh  - cont with spiriva     Barrette's esophagus  - cont with pantoprazole    Preventive measures  - lovenox x1 for tonight, hold AC starting tomorrow night for possible surgery on Monday  - SCD

## 2022-05-08 NOTE — H&P ADULT - NSICDXFAMILYHX_GEN_ALL_CORE_FT
FAMILY HISTORY:  Father  Still living? No  Family history of colon cancer, Age at diagnosis: 71-80  Family history of diabetes mellitus, Age at diagnosis: Age Unknown  Family history of hypertension, Age at diagnosis: Age Unknown  FH: pancreatic cancer, Age at diagnosis: Age Unknown    Mother  Still living? Yes, Estimated age: Age Unknown  Family history of diabetes mellitus, Age at diagnosis: Age Unknown  Family history of hypertension, Age at diagnosis: Age Unknown    Sibling  Still living? Yes, Estimated age: Age Unknown  Family history of congenital heart disease, Age at diagnosis: Age Unknown    Child  Still living? Yes, Estimated age: Age Unknown  Family history of lung cancer, Age at diagnosis: Age Unknown  Family history of thyroid disorder, Age at diagnosis: Age Unknown

## 2022-05-08 NOTE — ED ADULT NURSE NOTE - NURSING NEURO ORIENTATION
Pain Medicine Follow-Up Note    Assessment:  1  Chronic pain syndrome    2  Lumbar radiculopathy    3  Lumbar degenerative disc disease        Plan:  Orders Placed This Encounter   Procedures    FL spine and pain procedure     Standing Status:   Future     Standing Expiration Date:   9/2/2025     Order Specific Question:   Reason for Exam:     Answer:   right L4-L5 TFESI     Order Specific Question:   Is the patient pregnant? Answer:   No     Order Specific Question:   Anticoagulant hold needed? Answer:   no     Upon examination, I discussed with the patient she may benefit from a right L4-L5 transforaminal epidural steroid injection, concurrent with the recommendation by Dr Buck Hua  The most common risks of the procedure were reviewed with the patient, and an order was placed for right L4-L5 transforaminal epidural steroid injection  Complete risks and benefits including bleeding, infection, tissue reaction, nerve injury and allergic reaction were discussed  The approach was demonstrated using models and literature was provided  Verbal and written consent was obtained  Follow-up is planned after right L4-L5 TFESI or sooner as warranted  Discharge instructions were provided  I personally saw and examined the patient and I agree with the above discussed plan of care  My impressions and treatment recommendations were discussed in detail with the patient who verbalized understanding and had no further questions  History of Present Illness:    Maggy Campos is a 55 y o  female who presents to [facility] for interval re-evaluation of the above stated pain complaints  The patient has a past medical and chronic pain history as outlined in the assessment section  She was last seen on 6/22/2021 in regards to chronic pain syndrome secondary to lumbar radiculopathy and lumbar disc disease    The patient currently reports ongoing low back pain that is worse since last office visit   The patient describes her pain as constant, burning, dull aching, sharp, throbbing, cramping, pressure-like, shooting pain with numbness and pins and needles that is worse in the morning  The patient reports right-sided low back pain that radiates into her right buttock with increased pain with prolonged sitting  The patient also reports numbness and weakness in her right lower extremity  The patient reports she has received epidural steroid injections in the past which have provided mild to moderate relief of her pain symptoms; however, the patient reports she does not believe that she has had injections at this site of her current pain symptoms  The patient was seen by Dr Lidia Glover on 6/28/2021 and again on 7/7/2021 at which time, the patient reports a right L5 TFESI was recommended by Dr Lidia Glover, with consideration for surgical evaluation as well  The patient reports no bowel or bladder dysfunction, and is able to complete ADLs with minimal difficulty  The patient currently rates her pain as 5/10 on the numeric rating scale  Other than as stated above, the patient denies any interval changes in medications, medical condition, mental condition, symptoms, or allergies since the last office visit  Review of Systems:    Review of Systems   Gastrointestinal: Positive for constipation  Musculoskeletal: Positive for gait problem  Decreased range of motion  Joint stiffness  Neurological: Positive for dizziness           Patient Active Problem List   Diagnosis    Generalized abdominal pain    Epigastric lump    Chronic pain syndrome    Chronic bilateral low back pain with right-sided sciatica    Lumbar radiculopathy    Lumbar degenerative disc disease    DISH (diffuse idiopathic skeletal hyperostosis)       Past Medical History:   Diagnosis Date    Degenerative joint disease     Depression     GERD (gastroesophageal reflux disease)     Hyperlipidemia     Lumbar radiculopathy  Thyroid nodule        Past Surgical History:   Procedure Laterality Date    CYST REMOVAL  10/2020    lt breast     HYSTERECTOMY      HYSTERECTOMY  1998    LIPOSUCTION  09/2020       History reviewed  No pertinent family history      Social History     Occupational History    Not on file   Tobacco Use    Smoking status: Never Smoker    Smokeless tobacco: Never Used   Vaping Use    Vaping Use: Never used   Substance and Sexual Activity    Alcohol use: Never    Drug use: Never    Sexual activity: Yes     Partners: Male         Current Outpatient Medications:     albuterol (PROVENTIL HFA,VENTOLIN HFA) 90 mcg/act inhaler, INHALE 2 PUFFS BY MOUTH AND INTO THE LUNGS 2 TIMES A DAY AS NEEDED FOR WHEEZING, Disp: , Rfl:     azithromycin (ZITHROMAX) 250 mg tablet, take 2 tablets by mouth TODAY then take 1 tablet DAILY FOR 4 DAYS, Disp: , Rfl:     BIOTIN EXTRA STRENGTH PO, Take by mouth, Disp: , Rfl:     cetirizine (ZyrTEC) 10 mg tablet, Take 1 tablet (10 mg total) by mouth daily, Disp: 90 tablet, Rfl: 1    Diclofenac Sodium (VOLTAREN) 1 %, apply 1 APPLICATION topically four times a day, Disp: , Rfl:     fluconazole (DIFLUCAN) 150 mg tablet, Take 150 mg by mouth daily, Disp: , Rfl:     FLUoxetine (PROzac) 20 mg capsule, , Disp: , Rfl:     FLUoxetine (PROzac) 40 MG capsule, take 1 capsule by mouth daily, Disp: , Rfl:     hydrOXYzine HCL (ATARAX) 10 mg tablet, take 1 tablet by mouth at bedtime, Disp: 30 tablet, Rfl: 0    ibuprofen (MOTRIN) 600 mg tablet, Take 1 tablet (600 mg total) by mouth every 6 (six) hours as needed for moderate pain, Disp: 60 tablet, Rfl: 0    meclizine (ANTIVERT) 25 mg tablet, take 1 tablet by mouth twice a day if needed for dizziness, Disp: , Rfl:     omeprazole (PriLOSEC) 40 MG capsule, Take 40 mg by mouth daily, Disp: , Rfl:     EPINEPHrine (EPIPEN) 0 3 mg/0 3 mL SOAJ, Inject 0 3 mg into a muscle (Patient not taking: Reported on 8/31/2021), Disp: , Rfl:     Allergies   Allergen Reactions    Bee Venom Anaphylaxis    Iodides Other (See Comments)    [place] (Diagnostic) - Food Allergy Hives     Gi upset    [place] Oil - Food Allergy GI Intolerance    Aspirin Other (See Comments)     shaking  convulsions      Metronidazole GI Intolerance     Gi upset      Sulfamethoxazole-Trimethoprim GI Intolerance     Gi upst      Tape  [Medical Tape] Other (See Comments) and Hives     fever  Other reaction(s): Other  fever       Physical Exam:    /80   Pulse 69   Ht 5' 4" (1 626 m)   Wt 70 8 kg (156 lb)   Breastfeeding No   BMI 26 78 kg/m²     Constitutional:normal, well developed, well nourished, alert, in no distress and non-toxic and no overt pain behavior   and overweight  Eyes:anicteric  HEENT:grossly intact  Neck:supple, symmetric, trachea midline and no masses   Pulmonary:even and unlabored  Cardiovascular:No edema or pitting edema present  Skin:Normal without rashes or lesions and well hydrated  Psychiatric:Mood and affect appropriate  Neurologic:Cranial Nerves II-XII grossly intact  Musculoskeletal:normal     Lumbar Spine Exam    Appearance:  Normal lordosis  Palpation/Tenderness:  right lumbar paraspinal tenderness  Range of Motion:  Flexion:  Minimally limited  with pain  Extension:  Minimally limited  with pain  Lateral Flexion - Left:  No limitation  without pain  Lateral Flexion - Right:  Minimally limited  with pain  Rotation - Left:  Minimally limited  with pain  Rotation - Right:  Minimally limited  with pain  Motor Strength:  Left hip flexion:  5/5  Left hip extension:  5/5  Right hip flexion:  5/5  Right hip extension:  5/5  Left knee flexion:  5/5  Left knee extension:  5/5  Right knee flexion:  5/5  Right knee extension:  5/5  Left foot dorsiflexion:  5/5  Left foot plantar flexion:  5/5  Right foot dorsiflexion:  5/5  Right foot plantar flexion:  5/5    Imaging  FL spine and pain procedure    (Results Pending)         Orders Placed This Encounter   Procedures    FL spine and pain procedure oriented to person, place and time

## 2022-05-08 NOTE — PATIENT PROFILE ADULT - FALL HARM RISK - HARM RISK INTERVENTIONS

## 2022-05-08 NOTE — H&P ADULT - NSHPREVIEWOFSYSTEMS_GEN_ALL_CORE
REVIEW OF SYSTEMS:  CONSTITUTIONAL:    no fever or weight loss or fatigue  EYES:    no eye pain or visual disturbances or discharge  ENMT:     no difficulty hearing or tinnitus or vertigo, no sinus or throat pain  NECK:    no pain or stiffness  RESPIRATORY:    +chronic cough/CORDERO, no wheezing or chills or hemoptysis  CARDIOVASCULAR:    +chronic BLE and BUE edema, no chest pain or palpitations or dizziness  GASTROINTESTINAL:    no abdominal or epigastric pain. no nausea or vomiting or hematemesis, no diarrhea or constipation. no melena or hematochezia.  GENITOURINARY:    no dysuria or frequency or hematuria or incontinence  NEUROLOGICAL:    no headaches or memory loss or loss of strength or numbness or tremors  SKIN:    no itching or burning or rashes or lesions   LYMPH NODES:    no enlarged glands  ENDOCRINE:    no heat or cold intolerance, no hair loss, no polydipsia or polyuria  MUSCULOSKELETAL:    +hip right pain, no swelling, no muscle or back or extremity pain  PSYCHIATRIC:    no depression or anxiety or mood swings or difficulty sleeping  HEME/LYMPH:    no easy bruising or bleeding gums  ALLERGY AND IMMUNOLOGIC:    no hives or eczema

## 2022-05-08 NOTE — ED ADULT NURSE NOTE - OBJECTIVE STATEMENT
Pt was going to sit down, but did not position properly over the chair, fell onto her right side. reports rt hip pain. stated that someone caught her before she fell hard.

## 2022-05-08 NOTE — ED PROCEDURE NOTE - NS_POSTPROCCAREGUIDE_ED_ALL_ED
Patient is now fully awake, with vital signs and temperature stable, hydration is adequate, patients Marilu’s  score is at baseline (or greater than 8), patient and escort has received  discharge education.

## 2022-05-08 NOTE — ED ADULT NURSE NOTE - NSIMPLEMENTINTERV_GEN_ALL_ED
Implemented All Fall Risk Interventions:  [place] to call system. Call bell, personal items and telephone within reach. Instruct patient to call for assistance. Room bathroom lighting operational. Non-slip footwear when patient is off stretcher. Physically safe environment: no spills, clutter or unnecessary equipment. Stretcher in lowest position, wheels locked, appropriate side rails in place. Provide visual cue, wrist band, yellow gown, etc. Monitor gait and stability. Monitor for mental status changes and reorient to person, place, and time. Review medications for side effects contributing to fall risk. Reinforce activity limits and safety measures with patient and family.

## 2022-05-08 NOTE — ED PROVIDER NOTE - CLINICAL SUMMARY MEDICAL DECISION MAKING FREE TEXT BOX
Patient with clinical suspicion for hip fracture. Will get labs/xray. Patient will require admission for repair

## 2022-05-08 NOTE — PATIENT PROFILE ADULT - FUNCTIONAL SCREEN CURRENT LEVEL: COMMUNICATION, MLM
Left a message for the patient to call in for her us results.  US insufficiency in  The left leg and US ABIs within normal limits.  Info sent to MD MOJGAN Cevallos to review  
Patient called back for ultrasound results. There is significant reflux noted in the left GSV. Will forward message to Ingrid Zabala RN to verify prior message of result WNL.  
0 = understands/communicates without difficulty

## 2022-05-08 NOTE — H&P ADULT - NSHPLABSRESULTS_GEN_ALL_CORE
LABS:                        13.1   12.22<H> )-----------( 540<H>    ( 08 May 2022 00:54 )             40.2     137    |  103    |  30<H>  ----------------------------<  115<H>    08 May 2022 00:54  4.3     |  26     |  1.00         Ca 9.7           08 May 2022 00:54        TPro  6.5    /  Alb  3.6    /  TBili  0.4    /  DBili  x      /  AST  29     /  ALT  26     /  AlkPhos  95     08 May 2022 00:54    PT/INR - ( 08 May 2022 00:54 )   PT: 12.2 ;   INR: 1.03          PTT - ( 08 May 2022 00:54 )  PTT:25.1<L>    Troponin trend:    EKG:  NSR with flattened TW in aVL, V2  Radiology:  CXR  prelim read by me - no gross infiltrates, clear

## 2022-05-08 NOTE — CONSULT NOTE ADULT - ASSESSMENT
86F with hx of lung CA (s/p CELESTE resection) and breast CA (s/p bilateral mastectomies and tamoxifen), Escobar's esophagus, HTN, asthma and COPD, PAD who presents with right hip pain after a fall.   Found to have a right intertrochanteric fracture.      lung ca  breast ca  GERD  FALL  Hip Fx  Asthma  COPD  PAD     86F with hx of lung CA (s/p CELESTE resection) and breast CA (s/p bilateral mastectomies and tamoxifen), Escobar's esophagus, HTN, asthma and COPD, PAD who presents with right hip pain after a fall.   Found to have a right intertrochanteric fracture.      lung ca  breast ca  GERD  FALL  Hip Fx  Asthma  COPD  PAD    pt with asthma copd emphysema - hx of Lung Ca and lobectomy in the past -   planned for OR today - reviewed risks - and dao op factors -   no objection for OR from Pulm point  pt follows with Dr ANDRÉS Morales in the office for Pulm Medicine -   cont Spiriva - Singulair - Asmanex - Proventil PRN  pt is on RA - VS noted  Imaging and Labs reviewed  old records reviewed  prior operation on Lung with Dr Aguilar at [facility]  I angel  pain assessment  assist with needs  outpatient records reviewed  will follow

## 2022-05-08 NOTE — CONSULT NOTE ADULT - ASSESSMENT
85y/o seen at [facility]  History HTN, high cholesterol, GERD, Escobar's esophagus, COPD, chronic leg edema, vaso-vagal syndrome, thyroid nodule  Breast cancer, s/p B/L mastectomy  S/P left upper lobe of lung resection for cancer  S/P B/L cataract surgery  S/P nasal surgery  S/P B/L knee surgery  S/P right wrist surgery  S/P D&C    Admitted for right hip fracture from mechanical fall while at a wedding  Usually walks with a walker, without any complaints    Plan:  - Continue Metoprolol succinate-25mg OD                  Nifedipine XL-30mg OD                  Atorvastatin-10mg HS  - ASA, Losartan and torsemide on hold and probably re-start after surgery  - Echocardiogram ordered  - Seen by Pulmonary  - Patient is at intermediate risk for cardiac events for an intermediate risk orthopedic hip surgery  - Patient is euvolemic and can proceed without any additional cardiac testing

## 2022-05-09 ENCOUNTER — TRANSCRIPTION ENCOUNTER (OUTPATIENT)
Age: 87
End: 2022-05-09

## 2022-05-09 LAB
ALBUMIN SERPL ELPH-MCNC: 3.5 G/DL — SIGNIFICANT CHANGE UP (ref 3.3–5)
ALP SERPL-CCNC: 78 U/L — SIGNIFICANT CHANGE UP (ref 30–120)
ALT FLD-CCNC: 22 U/L DA — SIGNIFICANT CHANGE UP (ref 10–60)
ANION GAP SERPL CALC-SCNC: 6 MMOL/L — SIGNIFICANT CHANGE UP (ref 5–17)
APPEARANCE UR: ABNORMAL
AST SERPL-CCNC: 15 U/L — SIGNIFICANT CHANGE UP (ref 10–40)
BACTERIA # UR AUTO: ABNORMAL
BASOPHILS # BLD AUTO: 0.07 K/UL — SIGNIFICANT CHANGE UP (ref 0–0.2)
BASOPHILS NFR BLD AUTO: 0.5 % — SIGNIFICANT CHANGE UP (ref 0–2)
BILIRUB SERPL-MCNC: 0.7 MG/DL — SIGNIFICANT CHANGE UP (ref 0.2–1.2)
BILIRUB UR-MCNC: NEGATIVE — SIGNIFICANT CHANGE UP
BUN SERPL-MCNC: 14 MG/DL — SIGNIFICANT CHANGE UP (ref 7–23)
CALCIUM SERPL-MCNC: 9.7 MG/DL — SIGNIFICANT CHANGE UP (ref 8.4–10.5)
CHLORIDE SERPL-SCNC: 100 MMOL/L — SIGNIFICANT CHANGE UP (ref 96–108)
CO2 SERPL-SCNC: 30 MMOL/L — SIGNIFICANT CHANGE UP (ref 22–31)
COLOR SPEC: YELLOW — SIGNIFICANT CHANGE UP
CREAT SERPL-MCNC: 0.64 MG/DL — SIGNIFICANT CHANGE UP (ref 0.5–1.3)
DIFF PNL FLD: ABNORMAL
EGFR: 86 ML/MIN/1.73M2 — SIGNIFICANT CHANGE UP
EOSINOPHIL # BLD AUTO: 0.35 K/UL — SIGNIFICANT CHANGE UP (ref 0–0.5)
EOSINOPHIL NFR BLD AUTO: 2.7 % — SIGNIFICANT CHANGE UP (ref 0–6)
EPI CELLS # UR: ABNORMAL
GLUCOSE SERPL-MCNC: 107 MG/DL — HIGH (ref 70–99)
GLUCOSE UR QL: NEGATIVE MG/DL — SIGNIFICANT CHANGE UP
HCT VFR BLD CALC: 40.5 % — SIGNIFICANT CHANGE UP (ref 34.5–45)
HGB BLD-MCNC: 13 G/DL — SIGNIFICANT CHANGE UP (ref 11.5–15.5)
IMM GRANULOCYTES NFR BLD AUTO: 1 % — SIGNIFICANT CHANGE UP (ref 0–1.5)
KETONES UR-MCNC: NEGATIVE — SIGNIFICANT CHANGE UP
LEUKOCYTE ESTERASE UR-ACNC: ABNORMAL
LYMPHOCYTES # BLD AUTO: 2.69 K/UL — SIGNIFICANT CHANGE UP (ref 1–3.3)
LYMPHOCYTES # BLD AUTO: 21 % — SIGNIFICANT CHANGE UP (ref 13–44)
MAGNESIUM SERPL-MCNC: 2.1 MG/DL — SIGNIFICANT CHANGE UP (ref 1.6–2.6)
MCHC RBC-ENTMCNC: 29.5 PG — SIGNIFICANT CHANGE UP (ref 27–34)
MCHC RBC-ENTMCNC: 32.1 GM/DL — SIGNIFICANT CHANGE UP (ref 32–36)
MCV RBC AUTO: 91.8 FL — SIGNIFICANT CHANGE UP (ref 80–100)
MONOCYTES # BLD AUTO: 1.33 K/UL — HIGH (ref 0–0.9)
MONOCYTES NFR BLD AUTO: 10.4 % — SIGNIFICANT CHANGE UP (ref 2–14)
NEUTROPHILS # BLD AUTO: 8.26 K/UL — HIGH (ref 1.8–7.4)
NEUTROPHILS NFR BLD AUTO: 64.4 % — SIGNIFICANT CHANGE UP (ref 43–77)
NITRITE UR-MCNC: POSITIVE
NRBC # BLD: 0 /100 WBCS — SIGNIFICANT CHANGE UP (ref 0–0)
PH UR: 6.5 — SIGNIFICANT CHANGE UP (ref 5–8)
PLATELET # BLD AUTO: 572 K/UL — HIGH (ref 150–400)
POTASSIUM SERPL-MCNC: 4.3 MMOL/L — SIGNIFICANT CHANGE UP (ref 3.5–5.3)
POTASSIUM SERPL-SCNC: 4.3 MMOL/L — SIGNIFICANT CHANGE UP (ref 3.5–5.3)
PROT SERPL-MCNC: 5.9 G/DL — LOW (ref 6–8.3)
PROT UR-MCNC: 30 MG/DL
RBC # BLD: 4.41 M/UL — SIGNIFICANT CHANGE UP (ref 3.8–5.2)
RBC # FLD: 15.5 % — HIGH (ref 10.3–14.5)
RBC CASTS # UR COMP ASSIST: ABNORMAL /HPF (ref 0–4)
SODIUM SERPL-SCNC: 136 MMOL/L — SIGNIFICANT CHANGE UP (ref 135–145)
SP GR SPEC: 1.01 — SIGNIFICANT CHANGE UP (ref 1.01–1.02)
UROBILINOGEN FLD QL: NEGATIVE MG/DL — SIGNIFICANT CHANGE UP
WBC # BLD: 12.83 K/UL — HIGH (ref 3.8–10.5)
WBC # FLD AUTO: 12.83 K/UL — HIGH (ref 3.8–10.5)
WBC UR QL: ABNORMAL

## 2022-05-09 PROCEDURE — 99232 SBSQ HOSP IP/OBS MODERATE 35: CPT

## 2022-05-09 DEVICE — NAIL CANN FIX 130D 10X170MM: Type: IMPLANTABLE DEVICE | Status: FUNCTIONAL

## 2022-05-09 DEVICE — GWIRE 3.2X400MM SS: Type: IMPLANTABLE DEVICE | Status: FUNCTIONAL

## 2022-05-09 DEVICE — SCREW LOCKING 5.0MM: Type: IMPLANTABLE DEVICE | Status: FUNCTIONAL

## 2022-05-09 DEVICE — BLADE HELICAL 11MMX95MM: Type: IMPLANTABLE DEVICE | Status: FUNCTIONAL

## 2022-05-09 RX ORDER — CEFAZOLIN SODIUM 1 G
2000 VIAL (EA) INJECTION EVERY 8 HOURS
Refills: 0 | Status: COMPLETED | OUTPATIENT
Start: 2022-05-10 | End: 2022-05-10

## 2022-05-09 RX ORDER — PANTOPRAZOLE SODIUM 20 MG/1
40 TABLET, DELAYED RELEASE ORAL
Refills: 0 | Status: DISCONTINUED | OUTPATIENT
Start: 2022-05-09 | End: 2022-05-11

## 2022-05-09 RX ORDER — HYDROMORPHONE HYDROCHLORIDE 2 MG/ML
0.5 INJECTION INTRAMUSCULAR; INTRAVENOUS; SUBCUTANEOUS
Refills: 0 | Status: DISCONTINUED | OUTPATIENT
Start: 2022-05-09 | End: 2022-05-09

## 2022-05-09 RX ORDER — ONDANSETRON 8 MG/1
4 TABLET, FILM COATED ORAL ONCE
Refills: 0 | Status: DISCONTINUED | OUTPATIENT
Start: 2022-05-09 | End: 2022-05-09

## 2022-05-09 RX ORDER — SODIUM CHLORIDE 9 MG/ML
1000 INJECTION, SOLUTION INTRAVENOUS
Refills: 0 | Status: DISCONTINUED | OUTPATIENT
Start: 2022-05-10 | End: 2022-05-10

## 2022-05-09 RX ORDER — ASPIRIN/CALCIUM CARB/MAGNESIUM 324 MG
81 TABLET ORAL DAILY
Refills: 0 | Status: DISCONTINUED | OUTPATIENT
Start: 2022-05-10 | End: 2022-05-13

## 2022-05-09 RX ORDER — TRAMADOL HYDROCHLORIDE 50 MG/1
50 TABLET ORAL EVERY 4 HOURS
Refills: 0 | Status: DISCONTINUED | OUTPATIENT
Start: 2022-05-09 | End: 2022-05-13

## 2022-05-09 RX ORDER — ACETAMINOPHEN 500 MG
1000 TABLET ORAL EVERY 8 HOURS
Refills: 0 | Status: DISCONTINUED | OUTPATIENT
Start: 2022-05-10 | End: 2022-05-13

## 2022-05-09 RX ORDER — ACETAMINOPHEN 500 MG
1000 TABLET ORAL ONCE
Refills: 0 | Status: COMPLETED | OUTPATIENT
Start: 2022-05-10 | End: 2022-05-10

## 2022-05-09 RX ORDER — SODIUM CHLORIDE 9 MG/ML
1000 INJECTION, SOLUTION INTRAVENOUS
Refills: 0 | Status: DISCONTINUED | OUTPATIENT
Start: 2022-05-09 | End: 2022-05-09

## 2022-05-09 RX ORDER — CHLORHEXIDINE GLUCONATE 213 G/1000ML
1 SOLUTION TOPICAL DAILY
Refills: 0 | Status: DISCONTINUED | OUTPATIENT
Start: 2022-05-09 | End: 2022-05-09

## 2022-05-09 RX ORDER — ACETAMINOPHEN 500 MG
1000 TABLET ORAL ONCE
Refills: 0 | Status: COMPLETED | OUTPATIENT
Start: 2022-05-09 | End: 2022-05-09

## 2022-05-09 RX ORDER — METOPROLOL TARTRATE 50 MG
25 TABLET ORAL DAILY
Refills: 0 | Status: DISCONTINUED | OUTPATIENT
Start: 2022-05-09 | End: 2022-05-13

## 2022-05-09 RX ORDER — LOSARTAN POTASSIUM 100 MG/1
50 TABLET, FILM COATED ORAL DAILY
Refills: 0 | Status: DISCONTINUED | OUTPATIENT
Start: 2022-05-11 | End: 2022-05-11

## 2022-05-09 RX ORDER — NIFEDIPINE 30 MG
30 TABLET, EXTENDED RELEASE 24 HR ORAL DAILY
Refills: 0 | Status: DISCONTINUED | OUTPATIENT
Start: 2022-05-09 | End: 2022-05-11

## 2022-05-09 RX ORDER — ENOXAPARIN SODIUM 100 MG/ML
40 INJECTION SUBCUTANEOUS EVERY 24 HOURS
Refills: 0 | Status: DISCONTINUED | OUTPATIENT
Start: 2022-05-10 | End: 2022-05-13

## 2022-05-09 RX ORDER — ONDANSETRON 8 MG/1
4 TABLET, FILM COATED ORAL EVERY 6 HOURS
Refills: 0 | Status: DISCONTINUED | OUTPATIENT
Start: 2022-05-09 | End: 2022-05-11

## 2022-05-09 RX ORDER — SODIUM CHLORIDE 9 MG/ML
1000 INJECTION, SOLUTION INTRAVENOUS
Refills: 0 | Status: DISCONTINUED | OUTPATIENT
Start: 2022-05-09 | End: 2022-05-10

## 2022-05-09 RX ORDER — CEFAZOLIN SODIUM 1 G
2000 VIAL (EA) INJECTION ONCE
Refills: 0 | Status: COMPLETED | OUTPATIENT
Start: 2022-05-09 | End: 2022-05-10

## 2022-05-09 RX ORDER — HYDROMORPHONE HYDROCHLORIDE 2 MG/ML
0.25 INJECTION INTRAMUSCULAR; INTRAVENOUS; SUBCUTANEOUS
Refills: 0 | Status: DISCONTINUED | OUTPATIENT
Start: 2022-05-09 | End: 2022-05-09

## 2022-05-09 RX ORDER — ATORVASTATIN CALCIUM 80 MG/1
10 TABLET, FILM COATED ORAL AT BEDTIME
Refills: 0 | Status: DISCONTINUED | OUTPATIENT
Start: 2022-05-09 | End: 2022-05-13

## 2022-05-09 RX ORDER — CELECOXIB 200 MG/1
200 CAPSULE ORAL EVERY 12 HOURS
Refills: 0 | Status: DISCONTINUED | OUTPATIENT
Start: 2022-05-10 | End: 2022-05-13

## 2022-05-09 RX ORDER — SENNA PLUS 8.6 MG/1
2 TABLET ORAL AT BEDTIME
Refills: 0 | Status: DISCONTINUED | OUTPATIENT
Start: 2022-05-09 | End: 2022-05-13

## 2022-05-09 RX ORDER — POLYETHYLENE GLYCOL 3350 17 G/17G
17 POWDER, FOR SOLUTION ORAL AT BEDTIME
Refills: 0 | Status: DISCONTINUED | OUTPATIENT
Start: 2022-05-09 | End: 2022-05-13

## 2022-05-09 RX ORDER — MAGNESIUM HYDROXIDE 400 MG/1
30 TABLET, CHEWABLE ORAL DAILY
Refills: 0 | Status: DISCONTINUED | OUTPATIENT
Start: 2022-05-09 | End: 2022-05-13

## 2022-05-09 RX ORDER — CHLORHEXIDINE GLUCONATE 213 G/1000ML
1 SOLUTION TOPICAL DAILY
Refills: 0 | Status: DISCONTINUED | OUTPATIENT
Start: 2022-05-09 | End: 2022-05-11

## 2022-05-09 RX ORDER — TIOTROPIUM BROMIDE 18 UG/1
1 CAPSULE ORAL; RESPIRATORY (INHALATION) DAILY
Refills: 0 | Status: DISCONTINUED | OUTPATIENT
Start: 2022-05-10 | End: 2022-05-13

## 2022-05-09 RX ORDER — TRAMADOL HYDROCHLORIDE 50 MG/1
100 TABLET ORAL EVERY 6 HOURS
Refills: 0 | Status: DISCONTINUED | OUTPATIENT
Start: 2022-05-09 | End: 2022-05-13

## 2022-05-09 RX ADMIN — HYDROMORPHONE HYDROCHLORIDE 0.5 MILLIGRAM(S): 2 INJECTION INTRAMUSCULAR; INTRAVENOUS; SUBCUTANEOUS at 19:49

## 2022-05-09 RX ADMIN — Medication 400 MILLIGRAM(S): at 21:59

## 2022-05-09 RX ADMIN — HYDROMORPHONE HYDROCHLORIDE 0.5 MILLIGRAM(S): 2 INJECTION INTRAMUSCULAR; INTRAVENOUS; SUBCUTANEOUS at 20:41

## 2022-05-09 RX ADMIN — Medication 1000 MILLIGRAM(S): at 22:41

## 2022-05-09 RX ADMIN — TIOTROPIUM BROMIDE 1 CAPSULE(S): 18 CAPSULE ORAL; RESPIRATORY (INHALATION) at 06:34

## 2022-05-09 RX ADMIN — Medication 1000 MILLIGRAM(S): at 10:25

## 2022-05-09 RX ADMIN — Medication 25 MILLIGRAM(S): at 06:15

## 2022-05-09 RX ADMIN — CHLORHEXIDINE GLUCONATE 1 APPLICATION(S): 213 SOLUTION TOPICAL at 15:00

## 2022-05-09 RX ADMIN — ATORVASTATIN CALCIUM 10 MILLIGRAM(S): 80 TABLET, FILM COATED ORAL at 21:59

## 2022-05-09 RX ADMIN — Medication 400 MILLIGRAM(S): at 09:56

## 2022-05-09 RX ADMIN — Medication 30 MILLIGRAM(S): at 09:49

## 2022-05-09 RX ADMIN — HYDROMORPHONE HYDROCHLORIDE 0.5 MILLIGRAM(S): 2 INJECTION INTRAMUSCULAR; INTRAVENOUS; SUBCUTANEOUS at 20:06

## 2022-05-09 RX ADMIN — HYDROMORPHONE HYDROCHLORIDE 0.25 MILLIGRAM(S): 2 INJECTION INTRAMUSCULAR; INTRAVENOUS; SUBCUTANEOUS at 08:10

## 2022-05-09 RX ADMIN — HYDROMORPHONE HYDROCHLORIDE 0.25 MILLIGRAM(S): 2 INJECTION INTRAMUSCULAR; INTRAVENOUS; SUBCUTANEOUS at 08:40

## 2022-05-09 RX ADMIN — SODIUM CHLORIDE 75 MILLILITER(S): 9 INJECTION, SOLUTION INTRAVENOUS at 09:52

## 2022-05-09 RX ADMIN — HYDROMORPHONE HYDROCHLORIDE 0.5 MILLIGRAM(S): 2 INJECTION INTRAMUSCULAR; INTRAVENOUS; SUBCUTANEOUS at 20:05

## 2022-05-09 RX ADMIN — PANTOPRAZOLE SODIUM 40 MILLIGRAM(S): 20 TABLET, DELAYED RELEASE ORAL at 06:17

## 2022-05-09 NOTE — PROGRESS NOTE ADULT - ASSESSMENT
86F with hx of lung CA (s/p CELESTE resection) and breast CA (s/p bilateral mastectomies and tamoxifen), Escobar's esophagus, HTN, asthma and COPD, PAD who presents with right hip pain after a fall.   Found to have a right intertrochanteric fracture.      lung ca  breast ca  GERD  FALL  Hip Fx  Asthma  COPD  PAD      TTE grossly normal - vs noted - labs reviewed - plan for OR today - Ortho eval noted    pt with asthma copd emphysema - hx of Lung Ca and lobectomy in the past -   planned for OR today - reviewed risks - and dao op factors -   no objection for OR from Pulm point  pt follows with Dr ANDRÉS Morales in the office for Pulm Medicine -   cont Spiriva - Singulair - Asmanex - Proventil PRN  pt is on RA - VS noted  Imaging and Labs reviewed  old records reviewed  prior operation on Lung with Dr Aguilar at [facility]  I angel  pain assessment  assist with needs  outpatient records reviewed  will follow

## 2022-05-09 NOTE — PROGRESS NOTE ADULT - ASSESSMENT
86F with hx of lung CA (s/p CELESTE resection) and breast CA (s/p bilateral mastectomies and tamoxifen), Escobar's esophagus, HTN, asthma and COPD, PAD who presents with right hip pain after a fall.   Found to have a right intertrochanteric fracture.      Right intertrochanteric fracture  -plan for surgery with OR today  -has underwent several surgeries, in past denies any complications with anesthesia   -EKG NSR nonspecific changes  -TTE with normal EF, normal LV, mild MR   -appreciate cardiac and medicine optimization,  -medically optimized to proceed with surgery today with at least moderate risk     Abnormal Urinalysis: false positive with epithelial cells taking from canister, not a clean cath  -difficult to obtain straight cath give her fracture and no UTI symptoms so will hold off for now, reassess as needed  -leukocytosis likely reactive to stress, fracture, not infection    HTN: BP stable  - c/w metoprolol XL and nifedipine ER  - restart ASA and losartan post op per cards  - cont with atorvastatin    Lymphedema  - hold diuretic until post op    Asthma/COPD  - cont with monteleukast  - cont with QVAR or equivalent  - cont with albuterol inh  - cont with spiriva   -pulm following: optimized for OR     Barrette's esophagus  - cont with pantoprazole    Preventive measures  - SCD  -defer to ortho when to restart lovenox ppx

## 2022-05-09 NOTE — PROGRESS NOTE ADULT - ASSESSMENT
The patient is an 86 year old female with a history of HTN, HL, chronic leg swelling, lung cancer s/p resection, COPD, vasovagal syncope who is admitted with a hip fracture.    Plan:  - ECG with no evidence of ischemia or infarction  - Echo with normal LV systolic function, no significant valve issues  - Continue metoprolol succinate 25 mg daily  - Continue nifedipine 30 mg daily  - Continue atorvastatin 10 mg daily  - Resume aspirin, losartan, and torsemide post-operatively  - The patient is at intermediate risk for cardiac events for an intermediate risk surgery and is optimized to proceed from a cardiac standpoint

## 2022-05-09 NOTE — PRE-OP CHECKLIST - SELECT TESTS ORDERED
CBC/CMP/PT/PTT/INR/Results in MD note/COVID-19 CBC/CMP/PT/PTT/INR/Type and Screen/Results in MD note/COVID-19

## 2022-05-09 NOTE — BRIEF OPERATIVE NOTE - NSICDXBRIEFPREOP_GEN_ALL_CORE_FT
PRE-OP DIAGNOSIS:  Intertrochanteric fracture of right hip, sequela 09-May-2022 10:22:26  Rubens Bell

## 2022-05-09 NOTE — PROGRESS NOTE ADULT - SUBJECTIVE AND OBJECTIVE BOX
Chief Complaint: Fall    Interval Events: No events overnight.    Review of Systems:  General: No fevers, chills, weight gain  Skin: No rashes, color changes  Cardiovascular: No chest pain, orthopnea  Respiratory: No shortness of breath, cough  Gastrointestinal: No nausea, abdominal pain  Genitourinary: No incontinence, pain with urination  Musculoskeletal: +pain, swelling, decreased range of motion  Neurological: No headache, weakness  Psychiatric: No depression, anxiety  Endocrine: No weight gain, increased thirst  All other systems are comprehensively negative.    Physical Exam:  Vitals:        Vital Signs Last 24 Hrs  T(C): 36.4 (09 May 2022 07:39), Max: 36.8 (08 May 2022 16:45)  T(F): 97.5 (09 May 2022 07:39), Max: 98.3 (08 May 2022 16:45)  HR: 86 (09 May 2022 07:39) (76 - 86)  BP: 147/84 (09 May 2022 07:39) (122/76 - 147/84)  BP(mean): --  RR: 18 (09 May 2022 07:39) (18 - 18)  SpO2: 94% (09 May 2022 07:39) (94% - 98%)  General: NAD  HEENT: MMM  Neck: No JVD, no carotid bruit  Lungs: CTAB  CV: RRR, nl S1/S2, no M/R/G  Abdomen: S/NT/ND, +BS  Extremities: No LE edema, no cyanosis  Neuro: AAOx3, non-focal  Skin: No rash    Labs:                        13.0   12.83 )-----------( 572      ( 09 May 2022 07:44 )             40.5     05-09    136  |  100  |  14  ----------------------------<  107<H>  4.3   |  30  |  0.64    Ca    9.7      09 May 2022 07:45    TPro  5.9<L>  /  Alb  3.5  /  TBili  0.7  /  DBili  x   /  AST  15  /  ALT  22  /  AlkPhos  78  05-09        PT/INR - ( 08 May 2022 00:54 )   PT: 12.2 sec;   INR: 1.03 ratio         PTT - ( 08 May 2022 00:54 )  PTT:25.1 sec

## 2022-05-09 NOTE — PROGRESS NOTE ADULT - SUBJECTIVE AND OBJECTIVE BOX
Patient is a 86y old  Female who presents with a chief complaint of fall -> right femoral fracture (09 May 2022 07:38)      SUBJECTIVE / OVERNIGHT EVENTS: No On events. Feels well. Hip pain well controlled. Denies dysuria, urgency, polyuria, foul smelling urine, f/c, back pain, abd pain. No cp, sob, dizziness, palpitations     ADDITIONAL REVIEW OF SYSTEMS: Negative except for above    MEDICATIONS  (STANDING):  atorvastatin 10 milliGRAM(s) Oral at bedtime  chlorhexidine 2% Cloths 1 Application(s) Topical daily  lactated ringers. 1000 milliLiter(s) (75 mL/Hr) IV Continuous <Continuous>  metoprolol succinate ER 25 milliGRAM(s) Oral daily  mometasone 220 MICROgram(s) Inhaler 1 Puff(s) Inhalation daily  montelukast 10 milliGRAM(s) Oral daily  NIFEdipine XL 30 milliGRAM(s) Oral daily  pantoprazole    Tablet 40 milliGRAM(s) Oral before breakfast  tiotropium 18 MICROgram(s) Capsule 1 Capsule(s) Inhalation daily    MEDICATIONS  (PRN):  ALBUTerol    90 MICROgram(s) HFA Inhaler 2 Puff(s) Inhalation every 6 hours PRN Shortness of Breath and/or Wheezing  aluminum hydroxide/magnesium hydroxide/simethicone Suspension 30 milliLiter(s) Oral every 4 hours PRN Dyspepsia  hydrocodone/homatropine Syrup 5 milliLiter(s) Oral every 6 hours PRN for cough  HYDROmorphone  Injectable 0.5 milliGRAM(s) IV Push every 3 hours PRN Severe Pain (7 - 10)  HYDROmorphone  Injectable 0.25 milliGRAM(s) IV Push every 3 hours PRN Moderate Pain (4 - 6)  melatonin 3 milliGRAM(s) Oral at bedtime PRN Insomnia  ondansetron Injectable 4 milliGRAM(s) IV Push every 8 hours PRN Nausea and/or Vomiting      CAPILLARY BLOOD GLUCOSE        I&O's Summary    08 May 2022 07:01  -  09 May 2022 07:00  --------------------------------------------------------  IN: 150 mL / OUT: 1000 mL / NET: -850 mL        PHYSICAL EXAM:  Vital Signs Last 24 Hrs  T(C): 36.4 (09 May 2022 07:39), Max: 36.8 (08 May 2022 16:45)  T(F): 97.5 (09 May 2022 07:39), Max: 98.3 (08 May 2022 16:45)  HR: 80 (09 May 2022 09:49) (76 - 86)  BP: 144/82 (09 May 2022 09:49) (122/76 - 147/84)  BP(mean): --  RR: 18 (09 May 2022 07:39) (18 - 18)  SpO2: 94% (09 May 2022 07:39) (94% - 98%)    PHYSICAL EXAM:  GENERAL: NAD, well-developed   HEAD:  Atraumatic, Normocephalic  NECK: Supple, No JVD  CHEST/LUNG: Clear to auscultation bilaterally  HEART: Regular rate and rhythm;   ABDOMEN: Soft, Nontender, Nondistended; Bowel sounds present  EXTREMITIES:  2+ Peripheral Pulses, trace ankle edema b/l LE  PSYCH: AAOx4  NEUROLOGY: non-focal      LABS:                        13.0   12.83 )-----------( 572      ( 09 May 2022 07:44 )             40.5     05-    136  |  100  |  14  ----------------------------<  107<H>  4.3   |  30  |  0.64    Ca    9.7      09 May 2022 07:45  Mg     2.1         TPro  5.9<L>  /  Alb  3.5  /  TBili  0.7  /  DBili  x   /  AST  15  /  ALT  22  /  AlkPhos  78  05-09    PT/INR - ( 08 May 2022 00:54 )   PT: 12.2 sec;   INR: 1.03 ratio         PTT - ( 08 May 2022 00:54 )  PTT:25.1 sec      Urinalysis Basic - ( 09 May 2022 11:15 )    Color: Yellow / Appearance: Slightly Turbid / S.010 / pH: x  Gluc: x / Ketone: Negative  / Bili: Negative / Urobili: Negative mg/dL   Blood: x / Protein: 30 mg/dL / Nitrite: Positive   Leuk Esterase: Moderate / RBC: 6-10 /HPF / WBC 26-50   Sq Epi: x / Non Sq Epi: Moderate / Bacteria: TNTC        RADIOLOGY & ADDITIONAL TESTS:    Imaging Personally Reviewed:    Electrocardiogram Personally Reviewed:    COORDINATION OF CARE:  Care Discussed with Consultants/Other Providers [Y/N]:  Prior or Outpatient Records Reviewed [Y/N]:

## 2022-05-09 NOTE — PROGRESS NOTE ADULT - SUBJECTIVE AND OBJECTIVE BOX
Date/Time Patient Seen:  		  Referring MD:   Data Reviewed	       Patient is a 86y old  Female who presents with a chief complaint of fall -> right femoral fracture (08 May 2022 10:01)      Subjective/HPI     PAST MEDICAL & SURGICAL HISTORY:  Hypertension    Asthma    Breast cancer  H/o 10/1998    Vasovagal syndrome    Lymph edema  right - NO IV NO BLOOD DRAW, NO B/P RIGHT ARM    Barretts esophagus    COPD (chronic obstructive pulmonary disease)    Abnormal lung field    Cataract  Bilateral    Thyroid nodule    Lung cancer    S/P mastectomy, bilateral  in 1998    Deviated nasal septum  had surgery in 1994    S/P cataract surgery  bilateral in 2009    S/P wrist surgery  right in 2012    Cataract  2009 B/l    H/O arthroplasty  right knee replacement 2013    H/O arthroscopy of left knee  2013    S/P D&amp;C (status post dilation and curettage)  Endometrial bx 2012    History of lung surgery  Left lng wedge resection   7/6/15          Medication list         MEDICATIONS  (STANDING):  atorvastatin 10 milliGRAM(s) Oral at bedtime  lactated ringers. 1000 milliLiter(s) (75 mL/Hr) IV Continuous <Continuous>  metoprolol succinate ER 25 milliGRAM(s) Oral daily  mometasone 220 MICROgram(s) Inhaler 1 Puff(s) Inhalation daily  montelukast 10 milliGRAM(s) Oral daily  NIFEdipine XL 30 milliGRAM(s) Oral daily  pantoprazole    Tablet 40 milliGRAM(s) Oral before breakfast  tiotropium 18 MICROgram(s) Capsule 1 Capsule(s) Inhalation daily    MEDICATIONS  (PRN):  acetaminophen     Tablet .. 650 milliGRAM(s) Oral every 6 hours PRN Temp greater or equal to 38C (100.4F), Mild Pain (1 - 3)  ALBUTerol    90 MICROgram(s) HFA Inhaler 2 Puff(s) Inhalation every 6 hours PRN Shortness of Breath and/or Wheezing  aluminum hydroxide/magnesium hydroxide/simethicone Suspension 30 milliLiter(s) Oral every 4 hours PRN Dyspepsia  hydrocodone/homatropine Syrup 5 milliLiter(s) Oral every 6 hours PRN for cough  melatonin 3 milliGRAM(s) Oral at bedtime PRN Insomnia  ondansetron Injectable 4 milliGRAM(s) IV Push every 8 hours PRN Nausea and/or Vomiting  traMADol 25 milliGRAM(s) Oral every 8 hours PRN Moderate Pain (4 - 6)         Vitals log        ICU Vital Signs Last 24 Hrs  T(C): 36.5 (08 May 2022 23:25), Max: 36.8 (08 May 2022 07:38)  T(F): 97.7 (08 May 2022 23:25), Max: 98.3 (08 May 2022 07:38)  HR: 76 (09 May 2022 05:50) (76 - 101)  BP: 132/80 (09 May 2022 05:50) (122/76 - 142/85)  BP(mean): --  ABP: --  ABP(mean): --  RR: 18 (08 May 2022 23:25) (18 - 18)  SpO2: 94% (08 May 2022 23:25) (92% - 98%)           Input and Output:  I&O's Detail    08 May 2022 07:01  -  09 May 2022 06:31  --------------------------------------------------------  IN:  Total IN: 0 mL    OUT:    Voided (mL): 800 mL  Total OUT: 800 mL    Total NET: -800 mL          Lab Data                        13.1   12.22 )-----------( 540      ( 08 May 2022 00:54 )             40.2     05-08    137  |  103  |  30<H>  ----------------------------<  115<H>  4.3   |  26  |  1.00    Ca    9.7      08 May 2022 00:54    TPro  6.5  /  Alb  3.6  /  TBili  0.4  /  DBili  x   /  AST  29  /  ALT  26  /  AlkPhos  95  05-08            Review of Systems	      Objective     Physical Examination    heart s1s2  lung dec BS  abd soft      Pertinent Lab findings & Imaging      Cassandra:  NO   Adequate UO     I&O's Detail    08 May 2022 07:01  -  09 May 2022 06:31  --------------------------------------------------------  IN:  Total IN: 0 mL    OUT:    Voided (mL): 800 mL  Total OUT: 800 mL    Total NET: -800 mL               Discussed with:     Cultures:	        Radiology

## 2022-05-09 NOTE — BRIEF OPERATIVE NOTE - NSICDXBRIEFPROCEDURE_GEN_ALL_CORE_FT
PROCEDURES:  Open reduction and internal fixation (ORIF) of right hip with intramedullary nail 09-May-2022 10:22:51  Rubens Bell

## 2022-05-09 NOTE — PROGRESS NOTE ADULT - SUBJECTIVE AND OBJECTIVE BOX
Post Op     STEFANO COBOS      86y        Female                                                                                                                 T(C): 36.5 (05-08-22 @ 23:25), Max: 36.8 (05-08-22 @ 16:45)  HR: 76 (05-09-22 @ 05:50) (76 - 78)  BP: 132/80 (05-09-22 @ 05:50) (122/76 - 132/80)  RR: 18 (05-08-22 @ 23:25) (18 - 18)  SpO2: 94% (05-08-22 @ 23:25) (94% - 98%)  Wt(kg): --    pre op   rt  open reduction internal fixation hip intertrochanteric  fracture     Patient denies shortness of breath, chest pain, dyspnea, No complaints  Pain is 3 /10    Physical Exam    Extremity: Bilaterally:  No holmon                                           No Cord                                          PAS on                                          Neurovascular intact                                          Motor intact EHL/FHL                                          Sensation intact                                          Pulses intact DP/PT                                         Calves Soft                                         leg external rotated and shortened                                         Capillary refill with 5 seconds                          13.1   12.22 )-----------( 540      ( 08 May 2022 00:54 )             40.2       05-08    137  |  103  |  30<H>  ----------------------------<  115<H>  4.3   |  26  |  1.00    Ca    9.7      08 May 2022 00:54    TPro  6.5  /  Alb  3.6  /  TBili  0.4  /  DBili  x   /  AST  29  /  ALT  26  /  AlkPhos  95  05-08      A/P  --pre op open reduction internal fixation right hip  - pre op protocol  ancef  on call to OR   NPO   chlorohex prep  -  Medicine To Follow   - DVT prophylaxis PAS lovenox  d/c   - PT & OT   - Analagesia  - Incentive Spirometry  - Discharge Planning  - Safety Precautions  -  CBC , BMP daily    dose ofirmev ordered for  5/9/22  am 830 am   noted pulmo cardiac clearance  Patient aware of  pre op status and understand all instructions with out questions

## 2022-05-09 NOTE — BRIEF OPERATIVE NOTE - OPERATION/FINDINGS
Displaced IT Fx right Proximal Femur. Secure reduction/fixation Right Hip IT Fx with short IM Nail on Fluoro images

## 2022-05-10 ENCOUNTER — APPOINTMENT (OUTPATIENT)
Dept: INTERNAL MEDICINE | Facility: CLINIC | Age: 87
End: 2022-05-10

## 2022-05-10 LAB
ANION GAP SERPL CALC-SCNC: 8 MMOL/L — SIGNIFICANT CHANGE UP (ref 5–17)
BASOPHILS # BLD AUTO: 0.04 K/UL — SIGNIFICANT CHANGE UP (ref 0–0.2)
BASOPHILS NFR BLD AUTO: 0.3 % — SIGNIFICANT CHANGE UP (ref 0–2)
BUN SERPL-MCNC: 12 MG/DL — SIGNIFICANT CHANGE UP (ref 7–23)
CALCIUM SERPL-MCNC: 9.2 MG/DL — SIGNIFICANT CHANGE UP (ref 8.4–10.5)
CHLORIDE SERPL-SCNC: 100 MMOL/L — SIGNIFICANT CHANGE UP (ref 96–108)
CO2 SERPL-SCNC: 25 MMOL/L — SIGNIFICANT CHANGE UP (ref 22–31)
CREAT SERPL-MCNC: 0.65 MG/DL — SIGNIFICANT CHANGE UP (ref 0.5–1.3)
EGFR: 86 ML/MIN/1.73M2 — SIGNIFICANT CHANGE UP
EOSINOPHIL # BLD AUTO: 0 K/UL — SIGNIFICANT CHANGE UP (ref 0–0.5)
EOSINOPHIL NFR BLD AUTO: 0 % — SIGNIFICANT CHANGE UP (ref 0–6)
GLUCOSE SERPL-MCNC: 102 MG/DL — HIGH (ref 70–99)
HCT VFR BLD CALC: 39.3 % — SIGNIFICANT CHANGE UP (ref 34.5–45)
HGB BLD-MCNC: 12.4 G/DL — SIGNIFICANT CHANGE UP (ref 11.5–15.5)
IMM GRANULOCYTES NFR BLD AUTO: 0.6 % — SIGNIFICANT CHANGE UP (ref 0–1.5)
LYMPHOCYTES # BLD AUTO: 1.3 K/UL — SIGNIFICANT CHANGE UP (ref 1–3.3)
LYMPHOCYTES # BLD AUTO: 9.2 % — LOW (ref 13–44)
MCHC RBC-ENTMCNC: 28.8 PG — SIGNIFICANT CHANGE UP (ref 27–34)
MCHC RBC-ENTMCNC: 31.6 GM/DL — LOW (ref 32–36)
MCV RBC AUTO: 91.2 FL — SIGNIFICANT CHANGE UP (ref 80–100)
MONOCYTES # BLD AUTO: 0.91 K/UL — HIGH (ref 0–0.9)
MONOCYTES NFR BLD AUTO: 6.5 % — SIGNIFICANT CHANGE UP (ref 2–14)
NEUTROPHILS # BLD AUTO: 11.75 K/UL — HIGH (ref 1.8–7.4)
NEUTROPHILS NFR BLD AUTO: 83.4 % — HIGH (ref 43–77)
NRBC # BLD: 0 /100 WBCS — SIGNIFICANT CHANGE UP (ref 0–0)
PLATELET # BLD AUTO: 557 K/UL — HIGH (ref 150–400)
POTASSIUM SERPL-MCNC: 4.2 MMOL/L — SIGNIFICANT CHANGE UP (ref 3.5–5.3)
POTASSIUM SERPL-SCNC: 4.2 MMOL/L — SIGNIFICANT CHANGE UP (ref 3.5–5.3)
RBC # BLD: 4.31 M/UL — SIGNIFICANT CHANGE UP (ref 3.8–5.2)
RBC # FLD: 15.2 % — HIGH (ref 10.3–14.5)
SODIUM SERPL-SCNC: 133 MMOL/L — LOW (ref 135–145)
WBC # BLD: 14.09 K/UL — HIGH (ref 3.8–10.5)
WBC # FLD AUTO: 14.09 K/UL — HIGH (ref 3.8–10.5)

## 2022-05-10 PROCEDURE — 99232 SBSQ HOSP IP/OBS MODERATE 35: CPT

## 2022-05-10 RX ORDER — FLUTICASONE PROPIONATE 50 MCG
1 SPRAY, SUSPENSION NASAL
Refills: 0 | Status: DISCONTINUED | OUTPATIENT
Start: 2022-05-10 | End: 2022-05-13

## 2022-05-10 RX ADMIN — Medication 100 MILLIGRAM(S): at 02:44

## 2022-05-10 RX ADMIN — Medication 100 MILLIGRAM(S): at 10:32

## 2022-05-10 RX ADMIN — PANTOPRAZOLE SODIUM 40 MILLIGRAM(S): 20 TABLET, DELAYED RELEASE ORAL at 06:38

## 2022-05-10 RX ADMIN — Medication 5 MILLIGRAM(S): at 16:37

## 2022-05-10 RX ADMIN — TRAMADOL HYDROCHLORIDE 50 MILLIGRAM(S): 50 TABLET ORAL at 09:42

## 2022-05-10 RX ADMIN — CELECOXIB 200 MILLIGRAM(S): 200 CAPSULE ORAL at 11:59

## 2022-05-10 RX ADMIN — MONTELUKAST 10 MILLIGRAM(S): 4 TABLET, CHEWABLE ORAL at 12:07

## 2022-05-10 RX ADMIN — TRAMADOL HYDROCHLORIDE 50 MILLIGRAM(S): 50 TABLET ORAL at 21:45

## 2022-05-10 RX ADMIN — ALBUTEROL 2 PUFF(S): 90 AEROSOL, METERED ORAL at 06:38

## 2022-05-10 RX ADMIN — MOMETASONE FUROATE 1 PUFF(S): 220 INHALANT RESPIRATORY (INHALATION) at 06:39

## 2022-05-10 RX ADMIN — CELECOXIB 200 MILLIGRAM(S): 200 CAPSULE ORAL at 10:33

## 2022-05-10 RX ADMIN — ATORVASTATIN CALCIUM 10 MILLIGRAM(S): 80 TABLET, FILM COATED ORAL at 21:39

## 2022-05-10 RX ADMIN — Medication 30 MILLIGRAM(S): at 06:42

## 2022-05-10 RX ADMIN — CELECOXIB 200 MILLIGRAM(S): 200 CAPSULE ORAL at 21:39

## 2022-05-10 RX ADMIN — Medication 25 MILLIGRAM(S): at 16:27

## 2022-05-10 RX ADMIN — Medication 1000 MILLIGRAM(S): at 10:33

## 2022-05-10 RX ADMIN — Medication 1000 MILLIGRAM(S): at 11:59

## 2022-05-10 RX ADMIN — TRAMADOL HYDROCHLORIDE 50 MILLIGRAM(S): 50 TABLET ORAL at 22:30

## 2022-05-10 RX ADMIN — Medication 1000 MILLIGRAM(S): at 18:45

## 2022-05-10 RX ADMIN — Medication 1 SPRAY(S): at 18:46

## 2022-05-10 RX ADMIN — Medication 1000 MILLIGRAM(S): at 18:47

## 2022-05-10 RX ADMIN — Medication 81 MILLIGRAM(S): at 06:44

## 2022-05-10 RX ADMIN — ENOXAPARIN SODIUM 40 MILLIGRAM(S): 100 INJECTION SUBCUTANEOUS at 10:30

## 2022-05-10 RX ADMIN — POLYETHYLENE GLYCOL 3350 17 GRAM(S): 17 POWDER, FOR SOLUTION ORAL at 21:39

## 2022-05-10 RX ADMIN — SENNA PLUS 2 TABLET(S): 8.6 TABLET ORAL at 21:39

## 2022-05-10 RX ADMIN — CELECOXIB 200 MILLIGRAM(S): 200 CAPSULE ORAL at 21:00

## 2022-05-10 RX ADMIN — TRAMADOL HYDROCHLORIDE 50 MILLIGRAM(S): 50 TABLET ORAL at 10:12

## 2022-05-10 RX ADMIN — TIOTROPIUM BROMIDE 1 CAPSULE(S): 18 CAPSULE ORAL; RESPIRATORY (INHALATION) at 06:49

## 2022-05-10 RX ADMIN — Medication 100 MILLIGRAM(S): at 10:15

## 2022-05-10 RX ADMIN — Medication 400 MILLIGRAM(S): at 06:40

## 2022-05-10 RX ADMIN — Medication 1000 MILLIGRAM(S): at 06:50

## 2022-05-10 NOTE — PROGRESS NOTE ADULT - ASSESSMENT
86F with hx of lung CA (s/p CELESTE resection) and breast CA (s/p bilateral mastectomies and tamoxifen), Escobar's esophagus, HTN, asthma and COPD, PAD who presents with right hip pain after a fall.   Found to have a right intertrochanteric fracture.      Right intertrochanteric fracture   S/P open reduction internal fixation hip right on 5/9  -doing well post op-EKG NSR nonspecific changes  -pain control     Abnormal Urinalysis: false positive with epithelial cells taking from canister, not a clean cath  -difficult to obtain straight cath give her fracture and no UTI symptoms so will hold off for now, reassess as needed  -leukocytosis likely reactive to stress, fracture, not infection    HTN: BP stable  - c/w metoprolol XL and nifedipine ER  - restart ASA and losartan post op per cards  - cont with atorvastatin    Lymphedema  - hold diuretic until post op    Asthma/COPD  - cont with monteleukast  - cont with QVAR or equivalent  - cont with albuterol inh  - cont with spiriva   -pulm following: optimized for OR     Barrette's esophagus  - cont with pantoprazole    Preventive measures  - SCD  -defer to ortho when to restart lovenox ppx  86F with hx of lung CA (s/p CELESTE resection) and breast CA (s/p bilateral mastectomies and tamoxifen), Escobar's esophagus, HTN, asthma and COPD, PAD who presents with right hip pain after a fall.   Found to have a right intertrochanteric fracture.      Right intertrochanteric fracture   S/P open reduction internal fixation hip right on 5/9  -doing well post op-EKG NSR nonspecific changes  -pain control per ortho  -dvt ppx: lovenox 40mg daily x 14 days per ortho protocol post fracture    Abnormal Urinalysis: false positive with epithelial cells taking from canister, not a clean cath  -difficult to obtain straight cath give her fracture and no UTI symptoms so will hold off for now, reassess as needed    Leukocytosis: reactive to stress, fracture, surgery no s/s of infection  -UA as above, not clean catch and no UTI symptoms  -afebrile,     HTN: BP stable  - c/w metoprolol XL and nifedipine ER  - restarted ASA and losartan post op per cards/ortho  - cont with atorvastatin    Lymphedema  - restart diuretic prn on discharge    Asthma/COPD  - pt will bring her own Qvar because shes allergic to other inhalers  - cont with monteleukast  - cont with albuterol inh  - cont with spiriva   -pulm following:     Barrette's esophagus  - cont with pantoprazole    Preventive measures  lovenox      dispo: d/c to [facility] when placed

## 2022-05-10 NOTE — PROGRESS NOTE ADULT - SUBJECTIVE AND OBJECTIVE BOX
Post Op     STEFANO COBOS      86y        Female                                                                                                                 T(C): 36.5 (05-09-22 @ 23:00), Max: 36.9 (05-09-22 @ 15:45)  HR: 80 (05-10-22 @ 06:59) (80 - 106)  BP: 103/70 (05-10-22 @ 06:59) (103/70 - 147/84)  RR: 18 (05-09-22 @ 23:00) (10 - 19)  SpO2: 95% (05-09-22 @ 23:00) (94% - 97%)  Wt(kg): --    S/P   open reduction internal fixation right hip     Patient denies shortness of breath, chest pain, dyspnea, No complaints  Pain is 3 /10    Physical Exam    Extremity: Bilaterally:  No holmon                                           No Cord                                          PAS on                                          Neurovascular intact                                          Motor intact EHL/FHL                                          Sensation intact                                          Pulses intact DP/PT                                         Calves Soft                                         Dressing Clean / Dry / Intact silverlon                                         Capillary refill with 5 seconds                          13.0   12.83 )-----------( 572      ( 09 May 2022 07:44 )             40.5       05-09    136  |  100  |  14  ----------------------------<  107<H>  4.3   |  30  |  0.64    Ca    9.7      09 May 2022 07:45  Mg     2.1     05-09    TPro  5.9<L>  /  Alb  3.5  /  TBili  0.7  /  DBili  x   /  AST  15  /  ALT  22  /  AlkPhos  78  05-09      A/P  -- S/P open reduction internal fixation hip right     -  Medicine To Follow   - DVT prophylaxis PAS Lovenox  - PT & OT   - Analagesia  - Incentive Spirometry  - Discharge Planning  - Safety Precautions  -  CBC , BMP daily    wbat Post Op     STEFANO COBOS      86y        Female                                                                                                                 T(C): 36.5 (05-09-22 @ 23:00), Max: 36.9 (05-09-22 @ 15:45)  HR: 80 (05-10-22 @ 06:59) (80 - 106)  BP: 103/70 (05-10-22 @ 06:59) (103/70 - 147/84)  RR: 18 (05-09-22 @ 23:00) (10 - 19)  SpO2: 95% (05-09-22 @ 23:00) (94% - 97%)  Wt(kg): --    S/P   open reduction internal fixation right hip     Patient denies shortness of breath, chest pain, dyspnea, No complaints  Pain is 3 /10    Physical Exam    Extremity: Bilaterally:  No holmon                                           No Cord                                          PAS on                                          Neurovascular intact                                          Motor intact EHL/FHL                                          Sensation intact                                          Pulses intact DP/PT                                         Calves Soft                                         Dressing Clean / Dry / Intact silverlon                                         Capillary refill with 5 seconds                          13.0   12.83 )-----------( 572      ( 09 May 2022 07:44 )             40.5       05-09    136  |  100  |  14  ----------------------------<  107<H>  4.3   |  30  |  0.64    Ca    9.7      09 May 2022 07:45  Mg     2.1     05-09    TPro  5.9<L>  /  Alb  3.5  /  TBili  0.7  /  DBili  x   /  AST  15  /  ALT  22  /  AlkPhos  78  05-09      A/P  -- S/P open reduction internal fixation hip right     -  Medicine To Follow   - DVT prophylaxis PAS Lovenox  - PT & OT   - Analagesia  - Incentive Spirometry  - Discharge Planning  - Safety Precautions  -  CBC , BMP daily    wbat    inhaler treatments patient will bring own

## 2022-05-10 NOTE — OCCUPATIONAL THERAPY INITIAL EVALUATION ADULT - GENERAL OBSERVATIONS, REHAB EVAL
Pt found supine in bed +SCDs, IV, EDDIE wound dressing, prima fit Pt found supine in bed +SCDs, IV, prima fit

## 2022-05-10 NOTE — PROGRESS NOTE ADULT - SUBJECTIVE AND OBJECTIVE BOX
Date/Time Patient Seen:  		  Referring MD:   Data Reviewed	       Patient is a 86y old  Female who presents with a chief complaint of fall -> right femoral fracture (09 May 2022 14:05)      Subjective/HPI     PAST MEDICAL & SURGICAL HISTORY:  Hypertension    Asthma    Breast cancer  H/o 10/1998    Vasovagal syndrome    Lymph edema  right - NO IV NO BLOOD DRAW, NO B/P RIGHT ARM    Barretts esophagus    COPD (chronic obstructive pulmonary disease)    Abnormal lung field    Cataract  Bilateral    Thyroid nodule    Lung cancer    S/P mastectomy, bilateral  in 1998    Deviated nasal septum  had surgery in 1994    S/P cataract surgery  bilateral in 2009    S/P wrist surgery  right in 2012    Cataract  2009 B/l    H/O arthroplasty  right knee replacement 2013    H/O arthroscopy of left knee  2013    S/P D&amp;C (status post dilation and curettage)  Endometrial bx 2012    History of lung surgery  Left lng wedge resection   7/6/15          Medication list         MEDICATIONS  (STANDING):  acetaminophen     Tablet .. 1000 milliGRAM(s) Oral every 8 hours  acetaminophen   IVPB .. 1000 milliGRAM(s) IV Intermittent once  aspirin enteric coated 81 milliGRAM(s) Oral daily  atorvastatin 10 milliGRAM(s) Oral at bedtime  ceFAZolin   IVPB 2000 milliGRAM(s) IV Intermittent once  ceFAZolin   IVPB 2000 milliGRAM(s) IV Intermittent every 8 hours  celecoxib 200 milliGRAM(s) Oral every 12 hours  chlorhexidine 2% Cloths 1 Application(s) Topical daily  enoxaparin Injectable 40 milliGRAM(s) SubCutaneous every 24 hours  lactated ringers. 1000 milliLiter(s) (75 mL/Hr) IV Continuous <Continuous>  lactated ringers. 1000 milliLiter(s) (100 mL/Hr) IV Continuous <Continuous>  metoprolol succinate ER 25 milliGRAM(s) Oral daily  mometasone 220 MICROgram(s) Inhaler 1 Puff(s) Inhalation daily  montelukast 10 milliGRAM(s) Oral daily  NIFEdipine XL 30 milliGRAM(s) Oral daily  pantoprazole    Tablet 40 milliGRAM(s) Oral before breakfast  polyethylene glycol 3350 17 Gram(s) Oral at bedtime  senna 2 Tablet(s) Oral at bedtime  tiotropium 18 MICROgram(s) Capsule 1 Capsule(s) Inhalation daily    MEDICATIONS  (PRN):  ALBUTerol    90 MICROgram(s) HFA Inhaler 2 Puff(s) Inhalation every 6 hours PRN Shortness of Breath and/or Wheezing  aluminum hydroxide/magnesium hydroxide/simethicone Suspension 30 milliLiter(s) Oral every 4 hours PRN Dyspepsia  hydrocodone/homatropine Syrup 5 milliLiter(s) Oral every 6 hours PRN for cough  magnesium hydroxide Suspension 30 milliLiter(s) Oral daily PRN Constipation  melatonin 3 milliGRAM(s) Oral at bedtime PRN Insomnia  ondansetron Injectable 4 milliGRAM(s) IV Push every 6 hours PRN Nausea and/or Vomiting  traMADol 50 milliGRAM(s) Oral every 4 hours PRN Moderate Pain (4 - 6)  traMADol 100 milliGRAM(s) Oral every 6 hours PRN Severe Pain (7 - 10)         Vitals log        ICU Vital Signs Last 24 Hrs  T(C): 36.5 (09 May 2022 23:00), Max: 36.9 (09 May 2022 06:42)  T(F): 97.7 (09 May 2022 23:00), Max: 98.5 (09 May 2022 06:42)  HR: 105 (09 May 2022 23:00) (80 - 106)  BP: 104/68 (09 May 2022 23:00) (104/68 - 147/84)  BP(mean): --  ABP: --  ABP(mean): --  RR: 18 (09 May 2022 23:00) (10 - 19)  SpO2: 95% (09 May 2022 23:00) (94% - 97%)           Input and Output:  I&O's Detail    08 May 2022 07:01  -  09 May 2022 07:00  --------------------------------------------------------  IN:    Lactated Ringers: 150 mL  Total IN: 150 mL    OUT:    Voided (mL): 1000 mL  Total OUT: 1000 mL    Total NET: -850 mL      09 May 2022 07:01  -  10 May 2022 06:26  --------------------------------------------------------  IN:    Lactated Ringers: 950 mL  Total IN: 950 mL    OUT:    Blood Loss (mL): 75 mL    Voided (mL): 700 mL  Total OUT: 775 mL    Total NET: 175 mL          Lab Data                        13.0   12.83 )-----------( 572      ( 09 May 2022 07:44 )             40.5     05-09    136  |  100  |  14  ----------------------------<  107<H>  4.3   |  30  |  0.64    Ca    9.7      09 May 2022 07:45  Mg     2.1     05-09    TPro  5.9<L>  /  Alb  3.5  /  TBili  0.7  /  DBili  x   /  AST  15  /  ALT  22  /  AlkPhos  78  05-09            Review of Systems	      Objective     Physical Examination    heart s1s2  lung dec BS  abd soft      Pertinent Lab findings & Imaging      Cassandra:  NO   Adequate UO     I&O's Detail    08 May 2022 07:01  -  09 May 2022 07:00  --------------------------------------------------------  IN:    Lactated Ringers: 150 mL  Total IN: 150 mL    OUT:    Voided (mL): 1000 mL  Total OUT: 1000 mL    Total NET: -850 mL      09 May 2022 07:01  -  10 May 2022 06:26  --------------------------------------------------------  IN:    Lactated Ringers: 950 mL  Total IN: 950 mL    OUT:    Blood Loss (mL): 75 mL    Voided (mL): 700 mL  Total OUT: 775 mL    Total NET: 175 mL               Discussed with:     Cultures:	        Radiology

## 2022-05-10 NOTE — PHYSICAL THERAPY INITIAL EVALUATION ADULT - MANUAL MUSCLE TESTING RESULTS, REHAB EVAL
bilateral UEs 4+/5; left LE 4/5; right LE 4-/5 except hip flex n/t due to pain/grossly assessed due to

## 2022-05-10 NOTE — PROGRESS NOTE ADULT - ASSESSMENT
86F with hx of lung CA (s/p CELESTE resection) and breast CA (s/p bilateral mastectomies and tamoxifen), Escobar's esophagus, HTN, asthma and COPD, PAD who presents with right hip pain after a fall.   Found to have a right intertrochanteric fracture.      lung ca  breast ca  GERD  FALL  Hip Fx  Asthma  COPD  PAD      TTE grossly normal -   right Hip ORIF - POD 1     pt with asthma copd emphysema - hx of Lung Ca and lobectomy in the past -   pt follows with Dr ANDRÉS Morales in the office for Pulm Medicine -   cont Spiriva - Singulair - Asmanex - Proventil PRN  Imaging and Labs reviewed  old records reviewed  prior operation on Lung with Dr Aguilar at [facility]  I angel  pain assessment  assist with needs  outpatient records reviewed

## 2022-05-10 NOTE — PHYSICAL THERAPY INITIAL EVALUATION ADULT - PERTINENT HX OF CURRENT PROBLEM, REHAB EVAL
Pt. was at wedding, went to sit on chair and fell onto right side.  X-rays->non-displaced right IT fx.  Now s/p ORIF right hip.

## 2022-05-10 NOTE — OCCUPATIONAL THERAPY INITIAL EVALUATION ADULT - IMPAIRED TRANSFERS: SIT/STAND, REHAB EVAL
[address] [facility]        Pt Name: Tsosie Buerger  MRN: [id number]  [place] 1956  Date of evaluation: 5/3/2019  Provider: REGLA Appiah  PCP: Royal Trejo    This patient was seen and evaluated by the attending physician Maria M Ramsey MD.      [address] [place] [address]       Chief Complaint   Patient presents with    Fatigue     ongoing for a couple days    Dizziness     ongoing for a couple days    Shortness of Breath       HISTORY OF PRESENT ILLNESS   (Location/Symptom, Timing/Onset, Context/Setting, Quality, Duration, Modifying Factors, Severity)  Note limiting factors. Tsosie Buerger is a 58 y.o. female with reported history of asthma, fibromyalgia, mood disorder, who presents to the emergency Department with complaints of a 2 day history of shortness of breath, chest tightness, lightheadedness. The lightheadedness is worse with any movement. She denies vertigo. Denies syncope or loss of consciousness. She denies cough or wheezing. No fevers or chills. The patient denies abdominal pain, nausea, vomiting, diarrhea. No recent travel, exposure to sick contacts, or recent antibiotics. No other acute concerns, associated symptoms or modifying factors. Nursing Notes were all reviewed and agreed with or any disagreements were addressed  in the HPI. REVIEW OF SYSTEMS    (2-9 systems for level 4, 10 or more for level 5)     Review of Systems   Constitutional: Positive for fatigue. Negative for chills and fever. Eyes: Negative for pain. Respiratory: Positive for chest tightness and shortness of breath. Negative for cough. Cardiovascular: Negative for chest pain. Gastrointestinal: Negative for abdominal pain, diarrhea, nausea and vomiting. Genitourinary: Negative for dysuria. Musculoskeletal: Negative for back pain, neck pain and neck stiffness. Skin: Negative for rash.    Neurological: Positive for weakness and light-headedness. Negative for dizziness, syncope and headaches. Psychiatric/Behavioral: Negative for confusion. Positives and Pertinent negatives as per HPI. Except as noted abovein the ROS, all other systems were reviewed and negative. PAST MEDICAL HISTORY     Past Medical History:   Diagnosis Date    Arthritis     Asthma     Glaucoma     Hypertension     Migraine     Neuropathy ([address]     Thyroid disease     Ulcer disease          SURGICAL HISTORY     Past Surgical History:   Procedure Laterality Date    ANKLE FRACTURE SURGERY      FOOT SURGERY      GASTRIC BYPASS SURGERY      SHOULDER SURGERY      rotator cuff    WRIST FRACTURE SURGERY           CURRENTMEDICATIONS       Previous Medications    ACETAMINOPHEN-CODEINE (TYLENOL/CODEINE #3) 300-30 MG PER TABLET    Take 2 tablets by mouth 4 times daily as needed for Pain    ALPRAZOLAM (XANAX PO)    Take  by mouth. BUPRENORPHINE (BUTRANS) 7.5 MCG/HR PTWK    Place 1 patch onto the skin every 7 days    BUPROPION (WELLBUTRIN XL) 150 MG EXTENDED RELEASE TABLET    Take 1 tablet by mouth every morning    DICYCLOMINE (BENTYL) 10 MG CAPSULE    Take 10 mg by mouth 3 times daily (before meals). DIVALPROEX (DEPAKOTE SPRINKLE) 125 MG CAPSULE    Take 125 mg by mouth    FUROSEMIDE (LASIX) 40 MG TABLET    Take 40 mg by mouth 2 times daily    LURASIDONE (LATUDA) 40 MG TABS TABLET    Take 80 mg by mouth daily    METHOCARBAMOL (ROBAXIN) 500 MG TABLET    Take 500 mg by mouth 4 times daily    MISC. DEVICES (WALKER) MISC    1 Device by Does not apply route continuous. Dispense and Fit for injury to lower extremity and weakness. MULTIPLE VITAMIN (MULTI-VITAMIN PO)    Take  by mouth. OMEPRAZOLE (PRILOSEC) 20 MG CAPSULE        POTASSIUM CHLORIDE (KLOR-CON) 20 MEQ PACKET    Take 20 mEq by mouth 2 times daily    PRAMIPEXOLE (MIRAPEX) 1 MG TABLET    Take 1 mg by mouth 2 times daily.     QUETIAPINE (SEROQUEL) 400 MG TABLET    Take 400 mg by mouth 2 times daily.    RIVAROXABAN (XARELTO) 20 MG TABS TABLET    Take 20 mg by mouth    ROPINIROLE (REQUIP) 0.5 MG TABLET    Take 0.5 mg by mouth 3 times daily. SERTRALINE (ZOLOFT) 100 MG TABLET    Take 100 mg by mouth 2 times daily. TIMOLOL (TIMOPTIC-XR) 0.5 % OPHTHALMIC GEL-FORMING        VITAMIN E 1000 UNITS CAPSULE    Take 1,000 Units by mouth daily.     ZONISAMIDE (ZONEGRAN) 100 MG CAPSULE    Take 1 capsule by mouth nightly for 1 week then 2 by mouth daily         ALLERGIES     Lidocaine and Procaine hcl    FAMILYHISTORY       Family History   Problem Relation Age of Onset    Anesth Problems Mother     Arthritis Mother     Asthma Mother     Heart Disease Mother     Arthritis Father     Cancer Father     Heart Disease Father           SOCIAL HISTORY       Social History     Socioeconomic History    Marital status: Single     Spouse name: Not on file    Number of children: Not on file    Years of education: Not on file    Highest education level: Not on file   Occupational History    Not on file   Social Needs    Financial resource strain: Not on file    Food insecurity:     Worry: Not on file     Inability: Not on file    Transportation needs:     Medical: Not on file     Non-medical: Not on file   Tobacco Use    Smoking status: Never Smoker    Smokeless tobacco: Never Used   Substance and Sexual Activity    Alcohol use: No    Drug use: No    Sexual activity: Not on file   Lifestyle    Physical activity:     Days per week: Not on file     Minutes per session: Not on file    Stress: Not on file   Relationships    Social connections:     Talks on phone: Not on file     Gets together: Not on file     Attends Synagogue service: Not on file     Active member of club or organization: Not on file     Attends meetings of clubs or organizations: Not on file     Relationship status: Not on file    Intimate partner violence:     Fear of current or ex partner: Not on file     Emotionally abused: Not on file Physically abused: Not on file     Forced sexual activity: Not on file   Other Topics Concern    Not on file   Social History Narrative    Not on file       SCREENINGS             PHYSICAL EXAM    (up to 7 for level 4, 8 or more for level 5)     ED Triage Vitals [05/03/19 1226]   BP Temp Temp Source Pulse Resp SpO2 Height Weight   -- 98.7 °F (37.1 °C) Oral -- 17 -- -- --       Physical Exam   Constitutional: She is oriented to person, place, and time. She appears well-developed. No distress. Appears fatigued   HENT:   Head: Normocephalic and atraumatic. Eyes: Right eye exhibits no discharge. Left eye exhibits no discharge. Neck: Normal range of motion. Neck supple. Cardiovascular: Regular rhythm. Tachycardic 102   Pulmonary/Chest: Effort normal. No stridor. No respiratory distress. Decreased breath sounds bilaterally   Abdominal: Soft. Musculoskeletal: Normal range of motion. Neurological: She is alert and oriented to person, place, and time. No gross facial drooping. Moves all 4 extremities spontaneously. Skin: Skin is warm and dry. She is not diaphoretic. No pallor. Psychiatric: She has a normal mood and affect. Her behavior is normal.   Nursing note and vitals reviewed.       DIAGNOSTIC RESULTS   LABS:    Labs Reviewed   CBC WITH AUTO DIFFERENTIAL - Abnormal; Notable for the following components:       Result Value    WBC 11.2 (*)     RBC 3.10 (*)     Hemoglobin 8.6 (*)     Hematocrit 28.7 (*)     MCHC 30.0 (*)     Neutrophils # 8.9 (*)     All other components within normal limits    Narrative:     Performed at:  [address] [address]   Phone [phone]   BASIC METABOLIC PANEL W/ REFLEX TO MG FOR LOW K - Abnormal; Notable for the following components:    CO2 18 (*)     Glucose 100 (*)     BUN 23 (*)     CREATININE 1.3 (*)     GFR Non- 41 (*)     GFR  50 (*)     All other components within normal limits    Narrative:     Performed at:  [facility]  [address] [place] [address]   Phone [phone]   BRAIN NATRIURETIC PEPTIDE - Abnormal; Notable for the following components:    Pro- (*)     All other components within normal limits    Narrative:     Performed at:  [facility]  [address] [place] [address]   Phone [phone]   TROPONIN    Narrative:     Performed at:  [facility]  [address] [facility] [place] [address]   Phone [phone]   LACTIC ACID, PLASMA    Narrative:     Performed at:  [facility]  [address] [place] [address]   Phone [phone]   URINE RT REFLEX TO CULTURE       All other labs were within normal range or not returned as of this dictation. EKG: All EKG's are interpreted by the Emergency Department Physician who either signs orCo-signs this chart in the absence of a cardiologist.  Please see their note for interpretation of EKG. RADIOLOGY:   Non-plain film images such as CT, Ultrasound and MRI are read by the radiologist. Plain radiographic images are visualized andpreliminarily interpreted by the  ED Provider with the below findings:        Interpretation perthe Radiologist below, if available at the time of this note:    XR CHEST STANDARD (2 VW)   Final Result   No evidence of acute cardiopulmonary disease.            [unfilled]      PROCEDURES   Unless otherwise noted below, none     Procedures    CRITICAL CARE TIME   N/A    CONSULTS:  None      EMERGENCY DEPARTMENT COURSE and DIFFERENTIALDIAGNOSIS/MDM:   Vitals:    Vitals:    05/03/19 1230 05/03/19 1242 05/03/19 1300 05/03/19 1430   BP: 115/67  (!) 114/55 109/60   Pulse: 104  107 100   Resp: 16  14 13   Temp:       TempSrc:       SpO2: 97% 97% 96% 97%   Weight:           Patient was given thefollowing medications:  Medications   0.9 % sodium chloride bolus (has no administration in time range)   ipratropium-albuterol (DUONEB) nebulizer solution 1 ampule (1 ampule Inhalation Given 5/3/19 1239)   ipratropium-albuterol (DUONEB) nebulizer solution 1 ampule (1 ampule Inhalation Given 5/3/19 1239)   predniSONE (DELTASONE) tablet 60 mg (60 mg Oral Given 5/3/19 1445)     Differential Diagnosis: Cardiac arrhythmia, drop attack from a subarachnoid hemorrhage, sepsis, dehydration, vasovagal syncope, other    Patient seen and examined today for SOB, lightheadedness, chest tightness x 2 days. She told my attending onset 1 week. See HPI for patient presentation. Patient is in no acute distress, nontoxic, afebrile. Chest x-ray clear. She was given 2 breathing treatments because she did have decreased breath sounds bilaterally. She is satting 97% on room air. Troponin was negative. EKG without ST elevation. BNP was not elevated. Normal lactic. BUN 23, creatinine is 1.3; this is stable in comparison to her recent blood work performed at [facility] reviewed via care everywhere. She is anemic, hemoglobin is 8.6, hematocrit 28.7. Most recent hemoglobin at [facility] on May 1 is 9.4. Most recent nuclear stress test in June 2017 is negative for evidence of ischemia, with ejection fracture greater than 70%. Will consult hospitalist for chest pain, dyspnea, lightheadedness. With orthostatics there was no BP drop however she did complain of lightheadedness. HR increased 100-114 with standing.     Results for orders placed or performed during the hospital encounter of 05/03/19   CBC Auto Differential   Result Value Ref Range    WBC 11.2 (H) 4.0 - 11.0 K/uL    RBC 3.10 (L) 4.00 - 5.20 M/uL    Hemoglobin 8.6 (L) 12.0 - 16.0 g/dL    Hematocrit 28.7 (L) 36.0 - 48.0 %    MCV 92.6 80.0 - 100.0 fL    MCH 27.8 26.0 - 34.0 pg    MCHC 30.0 (L) 31.0 - 36.0 g/dL    RDW 14.3 12.4 - 15.4 %    Platelets 899 832 - 368 K/uL    MPV 7.7 5.0 - 10.5 fL    PLATELET SLIDE REVIEW Adequate     SLIDE REVIEW see below     Neutrophils % 79.2 %    Lymphocytes % 11.7 %    Monocytes % 8.7 %    Eosinophils % 0.1 %    Basophils % 0.3 %    Neutrophils # 8.9 (H) 1.7 - 7.7 K/uL    Lymphocytes # 1.3 1.0 - 5.1 K/uL    Monocytes # 1.0 0.0 - 1.3 K/uL    Eosinophils # 0.0 0.0 - 0.6 K/uL    Basophils # 0.0 0.0 - 0.2 K/uL   Basic Metabolic Panel w/ Reflex to MG   Result Value Ref Range    Sodium 140 136 - 145 mmol/L    Potassium reflex Magnesium 4.4 3.5 - 5.1 mmol/L    Chloride 106 99 - 110 mmol/L    CO2 18 (L) 21 - 32 mmol/L    Anion Gap 16 3 - 16    Glucose 100 (H) 70 - 99 mg/dL    BUN 23 (H) 7 - 20 mg/dL    CREATININE 1.3 (H) 0.6 - 1.2 mg/dL    GFR Non- 41 (A) >60    GFR  50 (A) >60    Calcium 8.7 8.3 - 10.6 mg/dL   Troponin   Result Value Ref Range    Troponin <0.01 <0.01 ng/mL   Brain Natriuretic Peptide   Result Value Ref Range    Pro- (H) 0 - 124 pg/mL   Lactic Acid, Plasma   Result Value Ref Range    Lactic Acid 1.8 0.4 - 2.0 mmol/L   EKG 12 Lead   Result Value Ref Range    Ventricular Rate 97 BPM    Atrial Rate 97 BPM    P-R Interval 132 ms    QRS Duration 84 ms    Q-T Interval 354 ms    QTc Calculation (Bazett) 449 ms    P Axis 50 degrees    R Axis 34 degrees    T Axis 96 degrees    Diagnosis       Normal sinus rhythmNonspecific T wave abnormalityAbnormal ECGWhen compared with ECG of 12-SEP-2012 11:04,Nonspecific T wave abnormality now evident in Anterolateral leads       I spoke with Dr. Fredy Singh. We thoroughly discussed the history, physical exam, laboratory and imaging studies, as well as, emergency department course. Based upon that discussion, we've decided to admit Isabel Newton for further observation and evaluation of Rebeca Lobo's chest pain.   As I have deemed necessary from their history, physical, and studies, I have considered and evaluated Isabel Newton for the following diagnoses:  ACUTE CORONARY decreased ROM/decreased strength

## 2022-05-10 NOTE — PROGRESS NOTE ADULT - ASSESSMENT
The patient is an 86 year old female with a history of HTN, HL, chronic leg swelling, lung cancer s/p resection, COPD, vasovagal syncope who is admitted with a hip fracture.    Plan:  - ECG with no evidence of ischemia or infarction  - Echo with normal LV systolic function, no significant valve issues  - Continue metoprolol succinate 25 mg daily  - Continue nifedipine 30 mg daily  - Continue atorvastatin 10 mg daily  - Continue aspirin 81 mg daily  - Resume losartan tomorrow and torsemide on discharge  - Ortho follow-up  - PT

## 2022-05-10 NOTE — PROGRESS NOTE ADULT - SUBJECTIVE AND OBJECTIVE BOX
Chief Complaint: Fall    Interval Events: No events overnight.    Review of Systems:  General: No fevers, chills, weight gain  Skin: No rashes, color changes  Cardiovascular: No chest pain, orthopnea  Respiratory: No shortness of breath, cough  Gastrointestinal: No nausea, abdominal pain  Genitourinary: No incontinence, pain with urination  Musculoskeletal: +pain, swelling, decreased range of motion  Neurological: No headache, weakness  Psychiatric: No depression, anxiety  Endocrine: No weight gain, increased thirst  All other systems are comprehensively negative.    Physical Exam:  Vital Signs Last 24 Hrs  T(C): 36.4 (10 May 2022 08:07), Max: 36.9 (09 May 2022 15:45)  T(F): 97.6 (10 May 2022 08:07), Max: 98.5 (09 May 2022 15:45)  HR: 88 (10 May 2022 08:07) (80 - 106)  BP: 114/73 (10 May 2022 08:07) (103/70 - 144/82)  BP(mean): --  RR: 18 (10 May 2022 08:07) (10 - 19)  SpO2: 93% (10 May 2022 08:07) (93% - 97%)  General: NAD  HEENT: MMM  Neck: No JVD, no carotid bruit  Lungs: CTAB  CV: RRR, nl S1/S2, no M/R/G  Abdomen: S/NT/ND, +BS  Extremities: No LE edema, no cyanosis  Neuro: AAOx3, non-focal  Skin: No rash    Labs:    05-10    133<L>  |  100  |  12  ----------------------------<  102<H>  4.2   |  25  |  0.65    Ca    9.2      10 May 2022 08:16  Mg     2.1     05-09    TPro  5.9<L>  /  Alb  3.5  /  TBili  0.7  /  DBili  x   /  AST  15  /  ALT  22  /  AlkPhos  78  05-09                        12.4   14.09 )-----------( 557      ( 10 May 2022 08:16 )             39.3

## 2022-05-10 NOTE — PHYSICAL THERAPY INITIAL EVALUATION ADULT - RANGE OF MOTION EXAMINATION, REHAB EVAL
right hip flex unable due to pain; right knee ext wfl/bilateral upper extremity ROM was WFL (within functional limits)/Left LE ROM was WFL (within functional limits)/deficits as listed below

## 2022-05-11 ENCOUNTER — TRANSCRIPTION ENCOUNTER (OUTPATIENT)
Age: 87
End: 2022-05-11

## 2022-05-11 LAB
ANION GAP SERPL CALC-SCNC: 8 MMOL/L — SIGNIFICANT CHANGE UP (ref 5–17)
BUN SERPL-MCNC: 21 MG/DL — SIGNIFICANT CHANGE UP (ref 7–23)
CALCIUM SERPL-MCNC: 9.2 MG/DL — SIGNIFICANT CHANGE UP (ref 8.4–10.5)
CHLORIDE SERPL-SCNC: 100 MMOL/L — SIGNIFICANT CHANGE UP (ref 96–108)
CO2 SERPL-SCNC: 26 MMOL/L — SIGNIFICANT CHANGE UP (ref 22–31)
CREAT SERPL-MCNC: 0.88 MG/DL — SIGNIFICANT CHANGE UP (ref 0.5–1.3)
EGFR: 64 ML/MIN/1.73M2 — SIGNIFICANT CHANGE UP
GLUCOSE SERPL-MCNC: 94 MG/DL — SIGNIFICANT CHANGE UP (ref 70–99)
HCT VFR BLD CALC: 39.6 % — SIGNIFICANT CHANGE UP (ref 34.5–45)
HGB BLD-MCNC: 12.9 G/DL — SIGNIFICANT CHANGE UP (ref 11.5–15.5)
MCHC RBC-ENTMCNC: 29.3 PG — SIGNIFICANT CHANGE UP (ref 27–34)
MCHC RBC-ENTMCNC: 32.6 GM/DL — SIGNIFICANT CHANGE UP (ref 32–36)
MCV RBC AUTO: 90 FL — SIGNIFICANT CHANGE UP (ref 80–100)
NRBC # BLD: 0 /100 WBCS — SIGNIFICANT CHANGE UP (ref 0–0)
PLATELET # BLD AUTO: 590 K/UL — HIGH (ref 150–400)
POTASSIUM SERPL-MCNC: 4.2 MMOL/L — SIGNIFICANT CHANGE UP (ref 3.5–5.3)
POTASSIUM SERPL-SCNC: 4.2 MMOL/L — SIGNIFICANT CHANGE UP (ref 3.5–5.3)
RBC # BLD: 4.4 M/UL — SIGNIFICANT CHANGE UP (ref 3.8–5.2)
RBC # FLD: 15.3 % — HIGH (ref 10.3–14.5)
SODIUM SERPL-SCNC: 134 MMOL/L — LOW (ref 135–145)
WBC # BLD: 14.31 K/UL — HIGH (ref 3.8–10.5)
WBC # FLD AUTO: 14.31 K/UL — HIGH (ref 3.8–10.5)

## 2022-05-11 PROCEDURE — 99233 SBSQ HOSP IP/OBS HIGH 50: CPT

## 2022-05-11 RX ORDER — SODIUM CHLORIDE 9 MG/ML
1000 INJECTION INTRAMUSCULAR; INTRAVENOUS; SUBCUTANEOUS
Refills: 0 | Status: DISCONTINUED | OUTPATIENT
Start: 2022-05-11 | End: 2022-05-12

## 2022-05-11 RX ORDER — CEFTRIAXONE 500 MG/1
INJECTION, POWDER, FOR SOLUTION INTRAMUSCULAR; INTRAVENOUS
Refills: 0 | Status: DISCONTINUED | OUTPATIENT
Start: 2022-05-11 | End: 2022-05-13

## 2022-05-11 RX ORDER — TIOTROPIUM BROMIDE 18 UG/1
1 CAPSULE ORAL; RESPIRATORY (INHALATION)
Qty: 0 | Refills: 0 | DISCHARGE

## 2022-05-11 RX ORDER — TRAMADOL HYDROCHLORIDE 50 MG/1
2 TABLET ORAL
Qty: 0 | Refills: 0 | DISCHARGE
Start: 2022-05-11

## 2022-05-11 RX ORDER — TIOTROPIUM BROMIDE 18 UG/1
2 CAPSULE ORAL; RESPIRATORY (INHALATION)
Qty: 0 | Refills: 0 | DISCHARGE
Start: 2022-05-11

## 2022-05-11 RX ORDER — FLUTICASONE PROPIONATE 50 MCG
1 SPRAY, SUSPENSION NASAL
Qty: 0 | Refills: 0 | DISCHARGE
Start: 2022-05-11

## 2022-05-11 RX ORDER — FLUTICASONE PROPIONATE 220 MCG
1 AEROSOL WITH ADAPTER (GRAM) INHALATION
Qty: 0 | Refills: 0 | DISCHARGE

## 2022-05-11 RX ORDER — CELECOXIB 200 MG/1
1 CAPSULE ORAL
Qty: 0 | Refills: 0 | DISCHARGE
Start: 2022-05-11

## 2022-05-11 RX ORDER — CEFTRIAXONE 500 MG/1
1000 INJECTION, POWDER, FOR SOLUTION INTRAMUSCULAR; INTRAVENOUS ONCE
Refills: 0 | Status: COMPLETED | OUTPATIENT
Start: 2022-05-11 | End: 2022-05-11

## 2022-05-11 RX ORDER — PANTOPRAZOLE SODIUM 20 MG/1
40 TABLET, DELAYED RELEASE ORAL
Refills: 0 | Status: DISCONTINUED | OUTPATIENT
Start: 2022-05-11 | End: 2022-05-13

## 2022-05-11 RX ORDER — ENOXAPARIN SODIUM 100 MG/ML
40 INJECTION SUBCUTANEOUS
Qty: 0 | Refills: 0 | DISCHARGE
Start: 2022-05-11

## 2022-05-11 RX ORDER — LANOLIN ALCOHOL/MO/W.PET/CERES
1 CREAM (GRAM) TOPICAL
Qty: 0 | Refills: 0 | DISCHARGE
Start: 2022-05-11

## 2022-05-11 RX ORDER — SENNA PLUS 8.6 MG/1
2 TABLET ORAL
Qty: 0 | Refills: 0 | DISCHARGE
Start: 2022-05-11

## 2022-05-11 RX ORDER — POLYETHYLENE GLYCOL 3350 17 G/17G
17 POWDER, FOR SOLUTION ORAL
Qty: 0 | Refills: 0 | DISCHARGE
Start: 2022-05-11

## 2022-05-11 RX ORDER — SODIUM CHLORIDE 9 MG/ML
500 INJECTION INTRAMUSCULAR; INTRAVENOUS; SUBCUTANEOUS ONCE
Refills: 0 | Status: COMPLETED | OUTPATIENT
Start: 2022-05-11 | End: 2022-05-11

## 2022-05-11 RX ORDER — ACETAMINOPHEN 500 MG
2 TABLET ORAL
Qty: 0 | Refills: 0 | DISCHARGE
Start: 2022-05-11

## 2022-05-11 RX ORDER — TRAMADOL HYDROCHLORIDE 50 MG/1
1 TABLET ORAL
Qty: 0 | Refills: 0 | DISCHARGE
Start: 2022-05-11

## 2022-05-11 RX ORDER — CEFTRIAXONE 500 MG/1
1000 INJECTION, POWDER, FOR SOLUTION INTRAMUSCULAR; INTRAVENOUS EVERY 24 HOURS
Refills: 0 | Status: DISCONTINUED | OUTPATIENT
Start: 2022-05-12 | End: 2022-05-13

## 2022-05-11 RX ADMIN — ATORVASTATIN CALCIUM 10 MILLIGRAM(S): 80 TABLET, FILM COATED ORAL at 21:37

## 2022-05-11 RX ADMIN — Medication 1 SPRAY(S): at 18:34

## 2022-05-11 RX ADMIN — PANTOPRAZOLE SODIUM 40 MILLIGRAM(S): 20 TABLET, DELAYED RELEASE ORAL at 05:25

## 2022-05-11 RX ADMIN — SODIUM CHLORIDE 500 MILLILITER(S): 9 INJECTION INTRAMUSCULAR; INTRAVENOUS; SUBCUTANEOUS at 11:44

## 2022-05-11 RX ADMIN — Medication 1 SPRAY(S): at 05:31

## 2022-05-11 RX ADMIN — CELECOXIB 200 MILLIGRAM(S): 200 CAPSULE ORAL at 09:54

## 2022-05-11 RX ADMIN — CEFTRIAXONE 100 MILLIGRAM(S): 500 INJECTION, POWDER, FOR SOLUTION INTRAMUSCULAR; INTRAVENOUS at 11:43

## 2022-05-11 RX ADMIN — SENNA PLUS 2 TABLET(S): 8.6 TABLET ORAL at 21:36

## 2022-05-11 RX ADMIN — ENOXAPARIN SODIUM 40 MILLIGRAM(S): 100 INJECTION SUBCUTANEOUS at 09:53

## 2022-05-11 RX ADMIN — Medication 1000 MILLIGRAM(S): at 21:38

## 2022-05-11 RX ADMIN — Medication 5 MILLIGRAM(S): at 05:26

## 2022-05-11 RX ADMIN — Medication 1000 MILLIGRAM(S): at 06:18

## 2022-05-11 RX ADMIN — Medication 30 MILLIGRAM(S): at 05:27

## 2022-05-11 RX ADMIN — CELECOXIB 200 MILLIGRAM(S): 200 CAPSULE ORAL at 21:37

## 2022-05-11 RX ADMIN — TIOTROPIUM BROMIDE 1 CAPSULE(S): 18 CAPSULE ORAL; RESPIRATORY (INHALATION) at 05:31

## 2022-05-11 RX ADMIN — Medication 25 MILLIGRAM(S): at 05:27

## 2022-05-11 RX ADMIN — PANTOPRAZOLE SODIUM 40 MILLIGRAM(S): 20 TABLET, DELAYED RELEASE ORAL at 18:32

## 2022-05-11 RX ADMIN — Medication 1000 MILLIGRAM(S): at 05:27

## 2022-05-11 RX ADMIN — SODIUM CHLORIDE 50 MILLILITER(S): 9 INJECTION INTRAMUSCULAR; INTRAVENOUS; SUBCUTANEOUS at 10:45

## 2022-05-11 RX ADMIN — CELECOXIB 200 MILLIGRAM(S): 200 CAPSULE ORAL at 11:25

## 2022-05-11 RX ADMIN — Medication 1000 MILLIGRAM(S): at 13:41

## 2022-05-11 RX ADMIN — MONTELUKAST 10 MILLIGRAM(S): 4 TABLET, CHEWABLE ORAL at 13:10

## 2022-05-11 RX ADMIN — Medication 81 MILLIGRAM(S): at 05:26

## 2022-05-11 RX ADMIN — Medication 1000 MILLIGRAM(S): at 13:11

## 2022-05-11 RX ADMIN — CELECOXIB 200 MILLIGRAM(S): 200 CAPSULE ORAL at 21:38

## 2022-05-11 RX ADMIN — Medication 1000 MILLIGRAM(S): at 21:37

## 2022-05-11 NOTE — DISCHARGE NOTE PROVIDER - NSDCCPTREATMENT_GEN_ALL_CORE_FT
PRINCIPAL PROCEDURE  Procedure: Open reduction and internal fixation (ORIF) of right hip using intramedullary erica  Findings and Treatment: right hip fracture

## 2022-05-11 NOTE — DISCHARGE NOTE PROVIDER - HOSPITAL COURSE
86F with hx of lung CA (s/p CELESTE resection) and breast CA (s/p bilateral mastectomies and tamoxifen), Escobar's esophagus, HTN, thrombocytemia, asthma and COPD, PAD who presents with right hip pain after a fall.  Patient was in her usual state of health  and was at a wedding on 5/8/22 when she attempted to sit on a chair and fell.  Patient slid out of her chair and fell on her right side.  No LOC or head trauma.  No chest pain or SOB prior or after fall.  Patient was brought by ambulance to [place] ED on 5/8 for further evaluation where she was discovered to have a right intertrochanteric fracture.  Prior to fall, patient was in her usual state of health which included a chronic cough, chronic BLE edema (controlled on torsemide), chronic CORDERO when walking with a walker.  No new or acute symptoms.  After admission on 5/8/22, the appropriate medical clearances were obtained.  On 5/9/22 she was brought to the OR for ORIF right hip fracture w/ gamma nail by orthopedic surgeon Dr Chase Almaguer.   She received preop antibiotics (ancef).    The hospitalist team, PT, OT  were consulted for post operative care.  Ambulation, transfer, ROM and ADL training were performed.  Lovenox 40 mg daily, along w/ bilat venodynes was used for DVT prophylaxis.  Ecotrin 81 mg daily was continued due to hx thrombocytopenia (500's cell count).  She was deemed to require further rehabilition in a rehab center .  She had no signs of post op infection.  Instructions and medications are contained in this document.  She is to follow up w/ Dr Almaguer in 2 weeks fir further orthopedic care.   86F with hx of lung CA (s/p CELESTE resection) and breast CA (s/p bilateral mastectomies and tamoxifen), Escobar's esophagus, HTN, thrombocytemia, asthma and COPD, PAD who presents with right hip pain after a fall.  Patient was in her usual state of health  and was at a wedding on 5/8/22 when she attempted to sit on a chair and fell.  Patient slid out of her chair and fell on her right side.  No LOC or head trauma.  No chest pain or SOB prior or after fall.  Patient was brought by ambulance to [place] ED on 5/8 for further evaluation where she was discovered to have a right intertrochanteric fracture.  Prior to fall, patient was in her usual state of health which included a chronic cough, chronic BLE edema (controlled on torsemide), chronic CORDERO when walking with a walker.  No new or acute symptoms.  After admission on 5/8/22, the appropriate medical clearances were obtained.  On 5/9/22 she was brought to the OR for ORIF right hip fracture w/ gamma nail by orthopedic surgeon Dr Chase Almaguer.   She received preop antibiotics (ancef).    The hospitalist team, PT, OT  were consulted for post operative care.  Ambulation, transfer, ROM and ADL training were performed.  Lovenox 40 mg daily, along w/ bilat venodynes was used for DVT prophylaxis.  Ecotrin 81 mg daily was continued due to hx thrombocytopenia (500's cell count).  She was deemed to require further rehabilition in a rehab center .  She had no signs of post op infection.  Instructions and medications are contained in this document.  She is to follow up w/ Dr Almaguer in 2 weeks for further orthopedic care.   86F with hx of lung CA (s/p CELESTE resection) and breast CA (s/p bilateral mastectomies and tamoxifen), Escobar's esophagus, HTN, thrombocytemia, asthma and COPD, PAD who presents with right hip pain after a fall.  Patient was in her usual state of health  and was at a wedding on 5/8/22 when she attempted to sit on a chair and fell.  Patient slid out of her chair and fell on her right side.  No LOC or head trauma.  No chest pain or SOB prior or after fall.  Patient was brought by ambulance to [place] ED on 5/8 for further evaluation where she was discovered to have a right intertrochanteric fracture.  Prior to fall, patient was in her usual state of health which included a chronic cough, chronic BLE edema (controlled on torsemide), chronic CORDERO when walking with a walker.  No new or acute symptoms.  After admission on 5/8/22, the appropriate medical clearances were obtained.  On 5/9/22 she was brought to the OR for ORIF right hip fracture w/ gamma nail by orthopedic surgeon Dr Chase Almaguer.   She received preop antibiotics (ancef).    The hospitalist team, PT, OT  were consulted for post operative care.  Ambulation, transfer, ROM and ADL training were performed.  Lovenox 40 mg daily, along w/ bilat venodynes was used for DVT prophylaxis.  Ecotrin 81 mg daily was continued due to hx thrombocytopenia (500's cell count).  She gets frequent UTIs and UA was positive for nitrites, bacteria, leukoesterase, wbc, and rbc.  She was treated w/ IV rocephin and is to continue w/ cefpodoxime for an additional 2 doses for a total of 3 days treatment.  She was deemed to require further rehabilition in a rehab center .  She had no signs of post op infection.  Instructions and medications are contained in this document.  She is to follow up w/ Dr Almaguer in 2 weeks for further orthopedic care.

## 2022-05-11 NOTE — DISCHARGE NOTE PROVIDER - CARE PROVIDER_API CALL
Chase Almaguer)  Orthopaedic Surgery  [address] [place] [address]  Phone: [phone]  Fax: [phone]  Follow Up Time: 2 weeks   Chase Almaguer)  Orthopaedic Surgery  [address] [place] [address]  Phone: [phone]  Fax: [phone]  Follow Up Time: 2 weeks    Fredrick Hernandez)  Cardiovascular Disease; Internal Medicine  [address] [place] [address]  Phone: [phone]  Fax: [phone]  Follow Up Time: 1 week

## 2022-05-11 NOTE — DISCHARGE NOTE PROVIDER - NSDCMRMEDTOKEN_GEN_ALL_CORE_FT
acetaminophen 500 mg oral tablet: 2 tab(s) orally every 8 hours  aspirin 81 mg oral tablet: 1 tab(s) orally once a day  atorvastatin 10 mg oral tablet: 1 tab(s) orally once a day (at bedtime)  celecoxib 200 mg oral capsule: 1 cap(s) orally every 12 hours for 14 days  enoxaparin: 40 milligram(s) subcutaneous every 24 hours for 2 weeks  estradiol 0.1 mg/24 hr vaginal rin ring(s) vaginal 3 times a week.  If possible, don&#x27;t used for at least 2 weeks to help prevent dvt   fluticasone 50 mcg/inh nasal spray: 1 spray(s) nasal 2 times a day  hydrocodone-homatropine 5 mg-1.5 mg/5 mL oral syrup: 5 milliliter(s) orally every 6 hours, As Needed - for cough  losartan 50 mg oral tablet: 1 tab(s) orally once a day  melatonin 3 mg oral tablet: 1 tab(s) orally once a day (at bedtime), As needed, Insomnia  montelukast 10 mg oral tablet: 1 tab(s) orally once a day  NIFEdipine 30 mg oral tablet, extended release: 1 tab(s) orally once a day  pantoprazole 40 mg oral delayed release tablet:  orally 2 times a day  polyethylene glycol 3350 oral powder for reconstitution: 17 gram(s) orally once a day (at bedtime)  ProAir HFA CFC free 90 mcg/inh inhalation aerosol: 2 puff(s) inhaled 4 times a day, As Needed  Qvar 40 mcg/inh inhalation aerosol: 1 puff(s) inhaled 2 times a day  senna oral tablet: 2 tab(s) orally once a day (at bedtime)  tiotropium 2.5 mcg/inh inhalation aerosol: 2 puff(s) inhaled once a day  Toprol-XL 25 mg oral tablet, extended release: 1 tab(s) orally once a day   torsemide: 5 milligram(s) orally once a day  traMADol 50 mg oral tablet: 1 tab(s) orally every 4 hours, As needed, Moderate Pain (4 - 6)  traMADol 50 mg oral tablet: 2 tab(s) orally every 6 hours, As needed, Severe Pain (7 - 10)   acetaminophen 500 mg oral tablet: 2 tab(s) orally every 8 hours  aspirin 81 mg oral tablet: 1 tab(s) orally once a day  atorvastatin 10 mg oral tablet: 1 tab(s) orally once a day (at bedtime)  cefpodoxime 100 mg oral tablet: 1 tab(s) orally every 12 hours x 1 day (22)  celecoxib 200 mg oral capsule: 1 cap(s) orally every 12 hours for 14 days  enoxaparin: 40 milligram(s) subcutaneous every 24 hours for 2 weeks  estradiol 0.1 mg/24 hr vaginal rin ring(s) vaginal 3 times a week.  If possible, don&#x27;t used for at least 2 weeks to help prevent dvt   fluticasone 50 mcg/inh nasal spray: 1 spray(s) nasal 2 times a day  hydrocodone-homatropine 5 mg-1.5 mg/5 mL oral syrup: 5 milliliter(s) orally every 6 hours, As Needed - for cough  losartan 50 mg oral tablet: 1 tab(s) orally once a day  melatonin 3 mg oral tablet: 1 tab(s) orally once a day (at bedtime), As needed, Insomnia  montelukast 10 mg oral tablet: 1 tab(s) orally once a day  NIFEdipine 30 mg oral tablet, extended release: 1 tab(s) orally once a day  pantoprazole 40 mg oral delayed release tablet:  orally 2 times a day  Pepcid 20 mg oral tablet: 1 tab(s) orally once a day, As Needed for heartburn  polyethylene glycol 3350 oral powder for reconstitution: 17 gram(s) orally once a day (at bedtime)  ProAir HFA CFC free 90 mcg/inh inhalation aerosol: 2 puff(s) inhaled 4 times a day, As Needed  Qvar 40 mcg/inh inhalation aerosol: 1 puff(s) inhaled 2 times a day  senna oral tablet: 2 tab(s) orally once a day (at bedtime)  tiotropium 2.5 mcg/inh inhalation aerosol: 2 puff(s) inhaled once a day  Toprol-XL 25 mg oral tablet, extended release: 1 tab(s) orally once a day   torsemide: 5 milligram(s) orally once a day  traMADol 50 mg oral tablet: 1 tab(s) orally every 4 hours, As needed, Moderate Pain (4 - 6)  traMADol 50 mg oral tablet: 2 tab(s) orally every 6 hours, As needed, Severe Pain (7 - 10)   acetaminophen 500 mg oral tablet: 2 tab(s) orally every 8 hours  aspirin 81 mg oral tablet: 1 tab(s) orally once a day  atorvastatin 10 mg oral tablet: 1 tab(s) orally once a day (at bedtime)  celecoxib 200 mg oral capsule: 1 cap(s) orally every 12 hours for 14 days  enoxaparin: 40 milligram(s) subcutaneous every 24 hours for 2 weeks  estradiol 0.1 mg/24 hr vaginal rin ring(s) vaginal 3 times a week.  If possible, don&#x27;t used for at least 2 weeks to help prevent dvt   fluticasone 50 mcg/inh nasal spray: 1 spray(s) nasal 2 times a day  hydrocodone-homatropine 5 mg-1.5 mg/5 mL oral syrup: 5 milliliter(s) orally every 6 hours, As Needed - for cough  losartan 50 mg oral tablet: 1 tab(s) orally once a day. Restart at rehab if SBP is stable and patient is not orthostatic   melatonin 3 mg oral tablet: 1 tab(s) orally once a day (at bedtime), As needed, Insomnia  montelukast 10 mg oral tablet: 1 tab(s) orally once a day  NIFEdipine 30 mg oral tablet, extended release: 1 tab(s) orally once a day. Hold for SBP &lt;110  pantoprazole 40 mg oral delayed release tablet:  orally 2 times a day  Pepcid 20 mg oral tablet: 1 tab(s) orally once a day, As Needed for heartburn  polyethylene glycol 3350 oral powder for reconstitution: 17 gram(s) orally once a day (at bedtime)  ProAir HFA CFC free 90 mcg/inh inhalation aerosol: 2 puff(s) inhaled 4 times a day, As Needed  Qvar 40 mcg/inh inhalation aerosol: 1 puff(s) inhaled 2 times a day  senna oral tablet: 2 tab(s) orally once a day (at bedtime)  tiotropium 2.5 mcg/inh inhalation aerosol: 2 puff(s) inhaled once a day  torsemide 5 mg oral tablet: 1 tab(s) orally once a day, As Needed. Take for swelling as needed if blood pressure stable  traMADol 50 mg oral tablet: 1 tab(s) orally every 4 hours, As needed, Moderate Pain (4 - 6)  traMADol 50 mg oral tablet: 2 tab(s) orally every 6 hours, As needed, Severe Pain (7 - 10)   acetaminophen 500 mg oral tablet: 2 tab(s) orally every 8 hours  aspirin 81 mg oral tablet: 1 tab(s) orally once a day  atorvastatin 10 mg oral tablet: 1 tab(s) orally once a day (at bedtime)  celecoxib 200 mg oral capsule: 1 cap(s) orally every 12 hours for 14 days  enoxaparin: 40 milligram(s) subcutaneous every 24 hours for 2 weeks  estradiol 0.1 mg/24 hr vaginal rin ring(s) vaginal 3 times a week.  If possible, don&#x27;t used for at least 2 weeks to help prevent dvt   fluticasone 50 mcg/inh nasal spray: 1 spray(s) nasal 2 times a day  hydrocodone-homatropine 5 mg-1.5 mg/5 mL oral syrup: 5 milliliter(s) orally every 6 hours, As Needed - for cough  losartan 50 mg oral tablet: 1 tab(s) orally once a day. Restart at rehab if SBP is stable and patient is not orthostatic   melatonin 3 mg oral tablet: 1 tab(s) orally once a day (at bedtime), As needed, Insomnia  montelukast 10 mg oral tablet: 1 tab(s) orally once a day  NIFEdipine 30 mg oral tablet, extended release: 1 tab(s) orally once a day. Hold for SBP &lt;110  pantoprazole 40 mg oral delayed release tablet:  orally 2 times a day  Pepcid 20 mg oral tablet: 1 tab(s) orally once a day, As Needed for heartburn  polyethylene glycol 3350 oral powder for reconstitution: 17 gram(s) orally once a day (at bedtime)  ProAir HFA CFC free 90 mcg/inh inhalation aerosol: 2 puff(s) inhaled 4 times a day, As Needed  Qvar 40 mcg/inh inhalation aerosol: 1 puff(s) inhaled 2 times a day  senna oral tablet: 2 tab(s) orally once a day (at bedtime)  tiotropium 2.5 mcg/inh inhalation aerosol: 2 puff(s) inhaled once a day  torsemide 5 mg oral tablet: 1 tab(s) orally once a day if blood pressure stable  traMADol 50 mg oral tablet: 1 tab(s) orally every 4 hours, As needed, Moderate Pain (4 - 6)  traMADol 50 mg oral tablet: 2 tab(s) orally every 6 hours, As needed, Severe Pain (7 - 10)

## 2022-05-11 NOTE — PROGRESS NOTE ADULT - ASSESSMENT
86F with hx of lung CA (s/p CELESTE resection) and breast CA (s/p bilateral mastectomies and tamoxifen), Escobar's esophagus, HTN, asthma and COPD, PAD who presents with right hip pain after a fall.   Found to have a right intertrochanteric fracture.      lung ca  breast ca  GERD  FALL  Hip Fx  Asthma  COPD  PAD    Plan for ELSY at [facility]  TTE grossly normal -   right Hip ORIF - POD 2  VS noted -     pt with asthma copd emphysema - hx of Lung Ca and lobectomy in the past -   pt follows with Dr ANDRÉS Morales in the office for Pulm Medicine -   cont Spiriva - Singulair - Asmanex - Proventil PRN  Imaging and Labs reviewed  old records reviewed  prior operation on Lung with Dr Aguilar at [facility]  I angel  pain assessment  assist with needs  outpatient records reviewed

## 2022-05-11 NOTE — PROGRESS NOTE ADULT - SUBJECTIVE AND OBJECTIVE BOX
Date/Time Patient Seen:  		  Referring MD:   Data Reviewed	       Patient is a 86y old  Female who presents with a chief complaint of fall -> right femoral fracture (10 May 2022 13:03)      Subjective/HPI     PAST MEDICAL & SURGICAL HISTORY:  Hypertension    Asthma    Breast cancer  H/o 10/1998    Vasovagal syndrome    Lymph edema  right - NO IV NO BLOOD DRAW, NO B/P RIGHT ARM    Barretts esophagus    COPD (chronic obstructive pulmonary disease)    Abnormal lung field    Cataract  Bilateral    Thyroid nodule    Lung cancer    S/P mastectomy, bilateral  in 1998    Deviated nasal septum  had surgery in 1994    S/P cataract surgery  bilateral in 2009    S/P wrist surgery  right in 2012    Cataract  2009 B/l    H/O arthroplasty  right knee replacement 2013    H/O arthroscopy of left knee  2013    S/P D&amp;C (status post dilation and curettage)  Endometrial bx 2012    History of lung surgery  Left lng wedge resection   7/6/15          Medication list         MEDICATIONS  (STANDING):  acetaminophen     Tablet .. 1000 milliGRAM(s) Oral every 8 hours  aspirin enteric coated 81 milliGRAM(s) Oral daily  atorvastatin 10 milliGRAM(s) Oral at bedtime  celecoxib 200 milliGRAM(s) Oral every 12 hours  chlorhexidine 2% Cloths 1 Application(s) Topical daily  enoxaparin Injectable 40 milliGRAM(s) SubCutaneous every 24 hours  fluticasone propionate 50 MICROgram(s)/spray Nasal Spray 1 Spray(s) Both Nostrils two times a day  losartan 50 milliGRAM(s) Oral daily  metoprolol succinate ER 25 milliGRAM(s) Oral daily  mometasone 220 MICROgram(s) Inhaler 1 Puff(s) Inhalation daily  montelukast 10 milliGRAM(s) Oral daily  NIFEdipine XL 30 milliGRAM(s) Oral daily  pantoprazole    Tablet 40 milliGRAM(s) Oral before breakfast  polyethylene glycol 3350 17 Gram(s) Oral at bedtime  senna 2 Tablet(s) Oral at bedtime  tiotropium 18 MICROgram(s) Capsule 1 Capsule(s) Inhalation daily  torsemide 5 milliGRAM(s) Oral daily    MEDICATIONS  (PRN):  ALBUTerol    90 MICROgram(s) HFA Inhaler 2 Puff(s) Inhalation every 6 hours PRN Shortness of Breath and/or Wheezing  aluminum hydroxide/magnesium hydroxide/simethicone Suspension 30 milliLiter(s) Oral every 4 hours PRN Dyspepsia  bisacodyl Suppository 10 milliGRAM(s) Rectal once PRN Constipation  hydrocodone/homatropine Syrup 5 milliLiter(s) Oral every 6 hours PRN for cough  magnesium hydroxide Suspension 30 milliLiter(s) Oral daily PRN Constipation  melatonin 3 milliGRAM(s) Oral at bedtime PRN Insomnia  ondansetron Injectable 4 milliGRAM(s) IV Push every 6 hours PRN Nausea and/or Vomiting  traMADol 50 milliGRAM(s) Oral every 4 hours PRN Moderate Pain (4 - 6)  traMADol 100 milliGRAM(s) Oral every 6 hours PRN Severe Pain (7 - 10)         Vitals log        ICU Vital Signs Last 24 Hrs  T(C): 37.1 (11 May 2022 00:15), Max: 37.1 (11 May 2022 00:15)  T(F): 98.8 (11 May 2022 00:15), Max: 98.8 (11 May 2022 00:15)  HR: 84 (11 May 2022 05:33) (79 - 97)  BP: 118/65 (11 May 2022 05:33) (103/68 - 118/65)  BP(mean): --  ABP: --  ABP(mean): --  RR: 17 (11 May 2022 00:15) (17 - 18)  SpO2: 90% (11 May 2022 00:15) (90% - 93%)           Input and Output:  I&O's Detail    09 May 2022 07:01  -  10 May 2022 07:00  --------------------------------------------------------  IN:    Lactated Ringers: 950 mL  Total IN: 950 mL    OUT:    Blood Loss (mL): 75 mL    Voided (mL): 700 mL  Total OUT: 775 mL    Total NET: 175 mL          Lab Data                        12.4   14.09 )-----------( 557      ( 10 May 2022 08:16 )             39.3     05-10    133<L>  |  100  |  12  ----------------------------<  102<H>  4.2   |  25  |  0.65    Ca    9.2      10 May 2022 08:16  Mg     2.1     05-09    TPro  5.9<L>  /  Alb  3.5  /  TBili  0.7  /  DBili  x   /  AST  15  /  ALT  22  /  AlkPhos  78  05-09            Review of Systems	      Objective     Physical Examination    heart s1s2  lung dec BS  abd soft      Pertinent Lab findings & Imaging      Cassandra:  NO   Adequate UO     I&O's Detail    09 May 2022 07:01  -  10 May 2022 07:00  --------------------------------------------------------  IN:    Lactated Ringers: 950 mL  Total IN: 950 mL    OUT:    Blood Loss (mL): 75 mL    Voided (mL): 700 mL  Total OUT: 775 mL    Total NET: 175 mL               Discussed with:     Cultures:	        Radiology

## 2022-05-11 NOTE — DISCHARGE NOTE PROVIDER - NSDCCPCAREPLAN_GEN_ALL_CORE_FT
PRINCIPAL DISCHARGE DIAGNOSIS  Diagnosis: Hip fracture, right  Assessment and Plan of Treatment: ORIF right hip w/ gamma nail  Ambulate WBAT.  Ambulation, gait, transfer, stair, ROM, ADL training  Silverlon dressing is water resistant and may get slightly swet in the shower.  Remove dressing in 7 days.  If wound is dry, it may get wet in the shower.  Staples /sutures to be removed in 2 weeks in surgeon's office or rehab.         PRINCIPAL DISCHARGE DIAGNOSIS  Diagnosis: Hip fracture, right  Assessment and Plan of Treatment: ORIF right hip w/ gamma nail  Ambulate WBAT.  Ambulation, gait, transfer, stair, ROM, ADL training  Silverlon dressing is water resistant and may get slightly swet in the shower. If wound is dry, it may get wet in the shower. Pt has a skin blister upper border of Silverlon dressing, once deflated may apply bacitracin twice a day. Siverlon dressing is waterproof. Remove dressing in 7 days (5/16) You may shower, but do not aim shower stream at surgical site. Pat dry after shower.   .    Staples to be removed in 2 weeks in surgeon's office or rehab.         PRINCIPAL DISCHARGE DIAGNOSIS  Diagnosis: Hip fracture, right  Assessment and Plan of Treatment: ORIF right hip w/ gamma nail  Ambulate WBAT.  Ambulation, gait, transfer, stair, ROM, ADL training  Silverlon dressing is water resistant and may get slightly swet in the shower. If wound is dry, it may get wet in the shower. Pt has a skin blister upper border of Silverlon dressing, once deflated may apply bacitracin twice a day. Siverlon dressing is waterproof. Remove dressing in 7 days (5/16) You may shower, but do not aim shower stream at surgical site. Pat dry after shower.   .    Staples to be removed in 2 weeks in surgeon's office or rehab.        SECONDARY DISCHARGE DIAGNOSES  Diagnosis: Orthostatic hypotension  Assessment and Plan of Treatment: Improved with hydration and adjustment of bp medication regimen. Restart Nifedipine at [facility], continue Metoprolol. If blood pressure stable and not symptomatic, add Losartan and PRN Torsemide. Follow up with cardiology on [facility]     PRINCIPAL DISCHARGE DIAGNOSIS  Diagnosis: Hip fracture, right  Assessment and Plan of Treatment: ORIF right hip w/ gamma nail  Ambulate WBAT.  Ambulation, gait, transfer, stair, ROM, ADL training  Silverlon dressing is water resistant and may get slightly swet in the shower. If wound is dry, it may get wet in the shower. Pt has a skin blister upper border of Silverlon dressing, once deflated may apply bacitracin twice a day. Siverlon dressing is waterproof. Remove dressing in 7 days (5/16) You may shower, but do not aim shower stream at surgical site. Pat dry after shower.   .    Staples to be removed in 2 weeks in surgeon's office or rehab.        SECONDARY DISCHARGE DIAGNOSES  Diagnosis: Orthostatic hypotension  Assessment and Plan of Treatment: Improved with hydration and adjustment of bp medication regimen. Restart Nifedipine at [facility], continue Metoprolol. If blood pressure stable and not symptomatic, add Losartan and Torsemide. Follow up with cardiology on [facility]

## 2022-05-11 NOTE — CONSULT NOTE ADULT - ASSESSMENT
86F with hx of lung CA (s/p CELESTE resection) and breast CA (s/p bilateral mastectomies and tamoxifen), Escobar's esophagus, HTN, asthma and COPD, PAD who presents with right hip pain after a fall.   Found to have a right intertrochanteric fracture.      Possible Acute Cystitis  UA reviewed: +epithelial cells taking from canister, not a clean catch; unable to obtain clear sample.   However pt reporting increased shakiness and some lightheadedness; no urinary sx.   Pt also reporting hx of frequent UTIs in the past  At this time will opt to treat w/ short course of Abx.   Plan:   UCx if possible to obtain clean sample, otherwise will empirically treat  Started pt on ceftriaxone 1gm q24h, switch to PO cefpodoxime 100mg BID to complete x3 day course w/ last day of Abx on 5/13    Right intertrochanteric fracture  S/P open reduction internal fixation hip right on 5/9  Ortho f/u  Pending rehab    D/c planning per primary team  D/w Dr. Sierra    Infectious Diseases will continue to follow. Please call with any questions.   Nolvia Jean M.D.  [facility], Division of Infectious Diseases [phone]

## 2022-05-11 NOTE — DISCHARGE NOTE NURSING/CASE MANAGEMENT/SOCIAL WORK - NSDCPEFALRISK_GEN_ALL_CORE
For information on Fall & Injury Prevention, visit: [email or web address] OR  [email or web address] OR  https://www.cdc.gov/steadi/patient.html

## 2022-05-11 NOTE — PROGRESS NOTE ADULT - SUBJECTIVE AND OBJECTIVE BOX
Patient is a 86y old  Female who presents with a chief complaint of fall -> right femoral fracture--for ORIF right hip fx w/ gamma nail (11 May 2022 09:12)      INTERVAL HPI/OVERNIGHT EVENTS: Patient seen and examined at bedside. No overnight events. BP dropped to 60's when ambulating with PT, subsequently improved to 90's. Patient denies headaches, dizziness, blurred vision, chest pain, palpitations.    MEDICATIONS  (STANDING):  acetaminophen     Tablet .. 1000 milliGRAM(s) Oral every 8 hours  aspirin enteric coated 81 milliGRAM(s) Oral daily  atorvastatin 10 milliGRAM(s) Oral at bedtime  celecoxib 200 milliGRAM(s) Oral every 12 hours  chlorhexidine 2% Cloths 1 Application(s) Topical daily  enoxaparin Injectable 40 milliGRAM(s) SubCutaneous every 24 hours  fluticasone propionate 50 MICROgram(s)/spray Nasal Spray 1 Spray(s) Both Nostrils two times a day  metoprolol succinate ER 25 milliGRAM(s) Oral daily  mometasone 220 MICROgram(s) Inhaler 1 Puff(s) Inhalation daily  montelukast 10 milliGRAM(s) Oral daily  pantoprazole    Tablet 40 milliGRAM(s) Oral two times a day  polyethylene glycol 3350 17 Gram(s) Oral at bedtime  senna 2 Tablet(s) Oral at bedtime  sodium chloride 0.9% Bolus 500 milliLiter(s) IV Bolus once  sodium chloride 0.9%. 1000 milliLiter(s) (50 mL/Hr) IV Continuous <Continuous>  tiotropium 18 MICROgram(s) Capsule 1 Capsule(s) Inhalation daily    MEDICATIONS  (PRN):  ALBUTerol    90 MICROgram(s) HFA Inhaler 2 Puff(s) Inhalation every 6 hours PRN Shortness of Breath and/or Wheezing  aluminum hydroxide/magnesium hydroxide/simethicone Suspension 30 milliLiter(s) Oral every 4 hours PRN Dyspepsia  bisacodyl Suppository 10 milliGRAM(s) Rectal once PRN Constipation  hydrocodone/homatropine Syrup 5 milliLiter(s) Oral every 6 hours PRN for cough  magnesium hydroxide Suspension 30 milliLiter(s) Oral daily PRN Constipation  melatonin 3 milliGRAM(s) Oral at bedtime PRN Insomnia  traMADol 50 milliGRAM(s) Oral every 4 hours PRN Moderate Pain (4 - 6)  traMADol 100 milliGRAM(s) Oral every 6 hours PRN Severe Pain (7 - 10)      Allergies    adhesives (Hives)  Advair Diskus (Rash)  Flovent (Rash)  Percocet 10/325 (Pruritus; Hives)  Pulmicort Flexhaler (Rash)    Intolerances        REVIEW OF SYSTEMS:  CONSTITUTIONAL: No fever or chills  HEENT:  No headache, no sore throat  RESPIRATORY: No cough, wheezing, or shortness of breath  CARDIOVASCULAR: No chest pain, palpitations, or leg swelling  GASTROINTESTINAL: No abd pain, nausea, vomiting, or diarrhea  GENITOURINARY: No dysuria, frequency, or hematuria  NEUROLOGICAL: no focal weakness or dizziness  MUSCULOSKELETAL: no myalgias     Vital Signs Last 24 Hrs  T(C): 36.7 (11 May 2022 07:54), Max: 37.1 (11 May 2022 00:15)  T(F): 98.1 (11 May 2022 07:54), Max: 98.8 (11 May 2022 00:15)  HR: 89 (11 May 2022 10:01) (79 - 97)  BP: 98/65 (11 May 2022 10:01) (64/41 - 118/65)  BP(mean): --  RR: 20 (11 May 2022 07:54) (17 - 20)  SpO2: 92% (11 May 2022 07:54) (90% - 93%)    PHYSICAL EXAM:  GENERAL: NAD  HEENT:  NCAT, moist mucous membranes  CHEST/LUNG:  CTA b/l, no rales, wheezes, or rhonchi  HEART:  RRR, S1, S2  ABDOMEN:  BS+, soft, nontender, nondistended  EXTREMITIES: no edema, cyanosis, or calf tenderness  NERVOUS SYSTEM: AA&Ox3, sensation intact    LABS:                        12.9   14.31 )-----------( 590      ( 11 May 2022 06:30 )             39.6     CBC Full  -  ( 11 May 2022 06:30 )  WBC Count : 14.31 K/uL  Hemoglobin : 12.9 g/dL  Hematocrit : 39.6 %  Platelet Count - Automated : 590 K/uL  Mean Cell Volume : 90.0 fl  Mean Cell Hemoglobin : 29.3 pg  Mean Cell Hemoglobin Concentration : 32.6 gm/dL  Auto Neutrophil # : x  Auto Lymphocyte # : x  Auto Monocyte # : x  Auto Eosinophil # : x  Auto Basophil # : x  Auto Neutrophil % : x  Auto Lymphocyte % : x  Auto Monocyte % : x  Auto Eosinophil % : x  Auto Basophil % : x    11 May 2022 06:30    134    |  100    |  21     ----------------------------<  94     4.2     |  26     |  0.88     Ca    9.2        11 May 2022 06:30        Urinalysis Basic - ( 09 May 2022 11:15 )    Color: Yellow / Appearance: Slightly Turbid / S.010 / pH: x  Gluc: x / Ketone: Negative  / Bili: Negative / Urobili: Negative mg/dL   Blood: x / Protein: 30 mg/dL / Nitrite: Positive   Leuk Esterase: Moderate / RBC: 6-10 /HPF / WBC 26-50   Sq Epi: x / Non Sq Epi: Moderate / Bacteria: TNTC      CAPILLARY BLOOD GLUCOSE              RADIOLOGY & ADDITIONAL TESTS: < from: US Transthoracic Echocardiogram w/Doppler Complete (22 @ 09:05) >  ACC: [id number] EXAM:  US TTE W DOPPLER COMPLETE                          PROCEDURE DATE:  2022          INTERPRETATION:  INDICATION: CAD  Referring M.D.:Josue Monique  Blood Pressure 149/82  Weight (pd) :134     Height (feet, inches):5'2"  Technician: Dana Bermeo    Dimensions:  LA 3.5       Normal Values: 2.0 - 4.0 cm  Ao 3.3        Normal Values: 2.0 - 3.8 cm  SEPTUM 0.8       Normal Values: 0.6 - 1.2 cm  PWT 1.0       Normal Values: 0.6 - 1.1 cm  LVIDd 3.8         Normal Values: 3.0 - 5.6 cm  LVIDs 2.6         Normal Values: 1.8 - 4.0 cm      OBSERVATIONS:    Mitral Valve: Normal mitral annulus and valve. Mild mitral regurgitation.  Aortic Valve/Aorta: Mildly sclerotic trileaflet aortic valve.  Tricuspid Valve: Normal tricuspid valve. Trace tricuspid regurgitation.  Pulmonic Valve: The pulmonic valve is not well visualized. Probably   normal.  Left Atrium: Normal left atrial size.  Right Atrium: Normal right atrial size.  Left Ventricle: Normal left ventricular size, thickness & systolic   function. The EF is approximately 55-60%. No regional wall motion   abnormalities.  Right Ventricle: Normal right ventricular size and function.  Pericardium/Pleura: No pericardial effusion noted.  Pulmonary/RV Pressure: The right ventricular systolic pressure is   estimated to be 36 mmHg, assuming that the right atrial pressure is   estimated to be 8 mmHg. This is consistent with Normal pulmonary   pressures.  LV Diastolic Function: Normal diastolic function.    Impression:    Normalleft ventricular size, thickness & systolic function. The EF is   approximately 55-60%. No regional wall motion abnormalities.    Mild mitral regurgitation.  Mildly sclerotic trileaflet aortic valve.    --- End of Report ---            DAVID HAYNES MD; Attending Cardiologist  This document has been electronically signed. May  8 2022  7:09PM    < end of copied text >    < from: Xray Hip w/ Pelvis 2 or 3 Views, Right (22 @ 01:13) >    ACC: [id number] EXAM:  XR HIP WITH PELV 2-3V RT                          PROCEDURE DATE:  2022          INTERPRETATION:  History: Fall.    FINDINGS: Frontal pelvis, frontal and abducted views right hip.    Nondisplaced right intertrochanteric fracture. Degenerative change   bilateral hips and visualized lower lumbar spine. Pessary is noted   overlying the pelvis.    IMPRESSION:    Nondisplaced right intertrochanteric fracture.    --- End of Report ---            CHETNA ABREU MD; Attending Radiologist  This document has been electronically signed. May  8 2022  2:03PM    < end of copied text >      Consultant(s) Notes Reviewed:  [x] YES  [ ] NO    Care Discussed with [x] Consultants  [x] Patient  [ ] Family  [ ]      [ x] Other; RN  DVT ppx

## 2022-05-11 NOTE — PROGRESS NOTE ADULT - SUBJECTIVE AND OBJECTIVE BOX
Procedure: ORIF Right  Hip Fx with gamma nail  POD#: 2    S: Pt without complaints. No SOB,CP, N/V. Tolerated Fluids / Diet well.   Pain comfortable on Interval Rx - + Tylenol + Celebrex. No BM yet  + flatus, No abdominal pain.  took tramadol 50 x 2  acetaminophen     Tablet .. 1000 milliGRAM(s) Oral every 8 hours  celecoxib 200 milliGRAM(s) Oral every 12 hours  melatonin 3 milliGRAM(s) Oral at bedtime PRN  ondansetron Injectable 4 milliGRAM(s) IV Push every 6 hours PRN  traMADol 50 milliGRAM(s) Oral every 4 hours PRN  traMADol 100 milliGRAM(s) Oral every 6 hours PRN    O: General: Pt Alert and oriented, On exam NAD,   VS: Vital Signs Last 24 Hrs  T(C): 36.7 (11 May 2022 07:54), Max: 37.1 (11 May 2022 00:15)  T(F): 98.1 (11 May 2022 07:54), Max: 98.8 (11 May 2022 00:15)  HR: 89 (11 May 2022 07:54) (79 - 97)  BP: 112/69 (11 May 2022 07:54) (103/68 - 118/65)  BP(mean): --  RR: 20 (11 May 2022 07:54) (17 - 20)  SpO2: 92% (11 May 2022 07:54) (90% - 93%)  Lungs: BS clear bilat.  Heart: RRR no murmur  Abdomen: Soft; no distention, benign exam  right hip silverlon clean and dry. Thigh softly swollen  Neurologic: Has sensation bilat. feet & toes ;  Full AROM bilat feet & toes. EHL / AT  = Bilat: 5/5   Vascular: Feet toes warm, pink. DP = 2+. Calves soft ; w/o tenderness bilat..  VTEP: On Bilat. Venodynes + aspirin enteric coated 81 milliGRAM(s) Oral daily  enoxaparin Injectable 40 milliGRAM(s) SubCutaneous every 24 hours     Activity in PT yesterday : Walked 5'                          12.9   14.31 )-----------( 590      ( 11 May 2022 06:30 )             39.6     05-10    133<L>  |  100  |  12  ----------------------------<  102<H>  4.2   |  25  |  0.65    Ca    9.2      10 May 2022 08:16        Hospitalist input noted    Primary Orthopedic Assessment:  • Stable from Orthopedic perspective  • Neuro motor exam stable  • Labs: stable      Plan:   • Continue:  PT/OT/Weightbearing as tolerated with assistance of a walker/Ice to hip/          Incentive spirometry encouraged /   • Continue DVT prophylaxis as prescribed, including use of compression devices and ankle pumps  • Continue Pain Rx  • Plans per Medicine   • Discharge planning – anticipated discharge is:  Subacute rehabilitation  when medically stable & cleared by PT/OT--today

## 2022-05-11 NOTE — DISCHARGE NOTE NURSING/CASE MANAGEMENT/SOCIAL WORK - NSDCFUADDAPPT_GEN_ALL_CORE_FT
Call Dr Almaguer's office for follow up in 2 weeks.  It is advisable to follow up w/ your pcp in 2-3 weeks to be sure there are no underlying problems.

## 2022-05-11 NOTE — DISCHARGE NOTE PROVIDER - NSDCFUSCHEDAPPT_GEN_ALL_CORE_FT
Fredrick Hernandez  [facility]  Cardio [address]  Scheduled Appointment: 05/24/2022    Jose Morales  [facility]  PulmMed [address]  Scheduled Appointment: 06/22/2022    Kathy Hinton  [facility]  U[facility]yn [address]  Scheduled Appointment: 07/18/2022

## 2022-05-11 NOTE — CONSULT NOTE ADULT - SUBJECTIVE AND OBJECTIVE BOX
[facility], Division of Infectious Diseases  GERRY Ivan S. Shah, CHRISTEN [facility]  [phone]    STEFANO COBOS  86y, Female  [id number]    HPI--  HPI:  86F with hx of lung CA (s/p CELESTE resection) and breast CA (s/p bilateral mastectomies and tamoxifen), Escobar's esophagus, HTN, asthma and COPD, PAD who presents with right hip pain after a fall.  Patient was in her usual state of health today and was at a wedding when she attempted to sit on a chair and fell.  Patient slid out of her chair and fell on her right side.  No LOC or head trauma.  No chest pain or SOB prior or after fall.  Patient came here for further evaluation where she was discovered to have a right intertrochanteric fracture.  Prior to fall, patient was in her usual state of health which included a chronic cough, chronic BLE edema (controlled on torsemide), chronic CORDERO when walking with a walker.  No new or acute symptoms.       (08 May 2022 02:49)    ID c/s on HD#3 for presumed UTI  Pt seen and examined at bedside  Reporting lightheadedness and shakiness that is unusual for her  Denies urinary sx  Has some back pain, but nothing acute  No other complaints    Active Medications--  acetaminophen     Tablet .. 1000 milliGRAM(s) Oral every 8 hours  ALBUTerol    90 MICROgram(s) HFA Inhaler 2 Puff(s) Inhalation every 6 hours PRN  aluminum hydroxide/magnesium hydroxide/simethicone Suspension 30 milliLiter(s) Oral every 4 hours PRN  aspirin enteric coated 81 milliGRAM(s) Oral daily  atorvastatin 10 milliGRAM(s) Oral at bedtime  bisacodyl Suppository 10 milliGRAM(s) Rectal once PRN  cefTRIAXone   IVPB      celecoxib 200 milliGRAM(s) Oral every 12 hours  chlorhexidine 2% Cloths 1 Application(s) Topical daily  enoxaparin Injectable 40 milliGRAM(s) SubCutaneous every 24 hours  fluticasone propionate 50 MICROgram(s)/spray Nasal Spray 1 Spray(s) Both Nostrils two times a day  hydrocodone/homatropine Syrup 5 milliLiter(s) Oral every 6 hours PRN  magnesium hydroxide Suspension 30 milliLiter(s) Oral daily PRN  melatonin 3 milliGRAM(s) Oral at bedtime PRN  metoprolol succinate ER 25 milliGRAM(s) Oral daily  mometasone 220 MICROgram(s) Inhaler 1 Puff(s) Inhalation daily  montelukast 10 milliGRAM(s) Oral daily  pantoprazole    Tablet 40 milliGRAM(s) Oral two times a day  polyethylene glycol 3350 17 Gram(s) Oral at bedtime  senna 2 Tablet(s) Oral at bedtime  sodium chloride 0.9% Bolus 500 milliLiter(s) IV Bolus once  sodium chloride 0.9%. 1000 milliLiter(s) IV Continuous <Continuous>  tiotropium 18 MICROgram(s) Capsule 1 Capsule(s) Inhalation daily  traMADol 50 milliGRAM(s) Oral every 4 hours PRN  traMADol 100 milliGRAM(s) Oral every 6 hours PRN    Antimicrobials:   cefTRIAXone   IVPB        Immunologic:     ROS:  CONSTITUTIONAL: No fevers or chills. No weakness or headache. No weight changes.  EYES/ENT: No visual or hearing changes. No sore throat or throat pain .  NECK: No pain or stiffness  RESPIRATORY: No cough, wheezing, or hemoptysis. No shortness of breath  CARDIOVASCULAR: No chest pain or palpitations  GASTROINTESTINAL: No abdominal pain. No nausea or vomiting. No diarrhea or constipation.  GENITOURINARY: No dysuria, frequency or hematuria  NEUROLOGICAL: No numbness or weakness  SKIN: No itching or rashes  PSYCHIATRIC: Pleasant. Appropriate affect    Allergies: adhesives (Hives)  Advair Diskus (Rash)  Flovent (Rash)  Percocet 10/325 (Pruritus; Hives)  Pulmicort Flexhaler (Rash)    PMH -- Hypertension    Asthma    Breast cancer    Vasovagal syndrome    Lymph edema    Barretts esophagus    COPD (chronic obstructive pulmonary disease)    Abnormal lung field    Cataract    Thyroid nodule    Lung cancer      PSH -- S/P mastectomy, bilateral    Deviated nasal septum    S/P cataract surgery    S/P wrist surgery    Cataract    H/O arthroplasty    H/O arthroscopy of left knee    S/P D&amp;C (status post dilation and curettage)    History of lung surgery      FH -- Family history of hypertension (Father, Mother)    Family history of diabetes mellitus (Father, Mother)    Family history of colon cancer (Father)    Family history of congenital heart disease (Sibling)    Family history of lung cancer (Child)    Family history of thyroid disorder (Child)    FH: pancreatic cancer (Father)      Social History --  EtOH: denies   Tobacco: denies   Drug Use: denies     Travel/Environmental/Occupational History:    Physical Exam--  Vital Signs Last 24 Hrs  T(F): 98.1 (11 May 2022 07:54), Max: 98.8 (11 May 2022 00:15)  HR: 89 (11 May 2022 10:01) (79 - 97)  BP: 98/65 (11 May 2022 10:01) (64/41 - 118/65)  RR: 20 (11 May 2022 07:54) (17 - 20)  SpO2: 92% (11 May 2022 07:54) (90% - 93%)  General: nontoxic-appearing, no acute distress  HEENT: NC/AT, EOMI, anicteric  Lungs: Clear bilaterally without rales, wheezing or rhonchi  Heart: Regular rate and rhythm. No murmur, rub or gallop.  Abdomen: Soft. Nondistended. Nontender.  Extremities: dressing of RLE c/d/i  Skin: Warm. Dry. Good turgor. No rash. No vasculitic stigmata.      Laboratory & Imaging Data--  CBC:                       12.9   14.31 )-----------( 590      ( 11 May 2022 06:30 )             39.6     CMP:     134<L>  |  100  |  21  ----------------------------<  94  4.2   |  26  |  0.88    Ca    9.2      11 May 2022 06:30        Urinalysis Basic - ( 09 May 2022 11:15 )    Color: Yellow / Appearance: Slightly Turbid / S.010 / pH: x  Gluc: x / Ketone: Negative  / Bili: Negative / Urobili: Negative mg/dL   Blood: x / Protein: 30 mg/dL / Nitrite: Positive   Leuk Esterase: Moderate / RBC: 6-10 /HPF / WBC 26-50   Sq Epi: x / Non Sq Epi: Moderate / Bacteria: TNTC        Microbiology: reviewed      Radiology: reviewed    < from: Xray Hip w/ Pelvis 2 or 3 Views, Right (22 @ 01:13) >    ACC: [id number] EXAM:  XR HIP WITH PELV 2-3V RT                          PROCEDURE DATE:  2022          INTERPRETATION:  History: Fall.    FINDINGS: Frontal pelvis, frontal and abducted views right hip.    Nondisplaced right intertrochanteric fracture. Degenerative change   bilateral hips and visualized lower lumbar spine. Pessary is noted   overlying the pelvis.    IMPRESSION:    Nondisplaced right intertrochanteric fracture.    --- End of Report ---            CHETNA ABREU MD; Attending Radiologist  This document has been electronically signed. May  8 2022  2:03PM    < end of copied text >  < from: Xray Chest 1 View AP/PA (22 @ 01:13) >    ACC: [id number] EXAM:  XR CHEST AP OR PA 1V                          PROCEDURE DATE:  2022          INTERPRETATION:  CLINICAL STATEMENT: Fall    TECHNIQUE: AP view of the chest.      COMPARISON: 11/10/2021    The cardiomediastinal silhouette isnormal and the kevin are not enlarged.   Aortic calcification. The trachea is midline. Mild volume loss left lung.   There is no focal lung consolidation or sizable pleural effusion.   Visualized bony thorax is grossly intact. Degenerative changes of the   spine and shoulders.    IMPRESSION:    Unremarkable frontal chest x ray    --- End of Report ---            CHETNA ABREU MD; Attending Radiologist  This document has been electronically signed. May  8 2022  2:07PM    < end of copied text >    
full consult dict [id number]  R hip IT fx  plan for ORIF monday to allow for medical/pulm/cards eval and optimization.
CARDIOLOGY CONSULT NOTE    Patient is a 86y Female with a known history of :    HPI:  86F with hx of lung CA (s/p CELESTE resection) and breast CA (s/p bilateral mastectomies and tamoxifen), Escobar's esophagus, HTN, asthma and COPD, PAD who presents with right hip pain after a fall.  Patient was in her usual state of health today and was at a wedding when she attempted to sit on a chair and fell.  Patient slid out of her chair and fell on her right side.  No LOC or head trauma.  No chest pain or SOB prior or after fall.  Patient came here for further evaluation where she was discovered to have a right intertrochanteric fracture.  Prior to fall, patient was in her usual state of health which included a chronic cough, chronic BLE edema (controlled on torsemide), chronic CORDERO when walking with a walker.  No new or acute symptoms.       (08 May 2022 02:49)      REVIEW OF SYSTEMS:    CONSTITUTIONAL: No fever, weight loss, or fatigue  EYES: No eye pain, visual disturbances, or discharge  ENMT:  No difficulty hearing, tinnitus, vertigo; No sinus or throat pain  NECK: No pain or stiffness  BREASTS: No pain, masses, or nipple discharge  RESPIRATORY: No cough, wheezing, chills or hemoptysis; No shortness of breath  CARDIOVASCULAR: No chest pain, palpitations, dizziness, or leg swelling  GASTROINTESTINAL: No abdominal or epigastric pain. No nausea, vomiting, or hematemesis; No diarrhea or constipation. No melena or hematochezia.  GENITOURINARY: No dysuria, frequency, hematuria, or incontinence  NEUROLOGICAL: No headaches, memory loss, loss of strength, numbness, or tremors  SKIN: No itching, burning, rashes, or lesions   LYMPH NODES: No enlarged glands  ENDOCRINE: No heat or cold intolerance; No hair loss  MUSCULOSKELETAL: No joint pain or swelling; No muscle, back, or extremity pain  PSYCHIATRIC: No depression, anxiety, mood swings, or difficulty sleeping  HEME/LYMPH: No easy bruising, or bleeding gums  ALLERGY AND IMMUNOLOGIC: No hives or eczema    MEDICATIONS  (STANDING):  atorvastatin 10 milliGRAM(s) Oral at bedtime  metoprolol succinate ER 25 milliGRAM(s) Oral daily  mometasone 220 MICROgram(s) Inhaler 1 Puff(s) Inhalation daily  montelukast 10 milliGRAM(s) Oral daily  NIFEdipine XL 30 milliGRAM(s) Oral daily  pantoprazole    Tablet 40 milliGRAM(s) Oral before breakfast  tiotropium 18 MICROgram(s) Capsule 1 Capsule(s) Inhalation daily    MEDICATIONS  (PRN):  acetaminophen     Tablet .. 650 milliGRAM(s) Oral every 6 hours PRN Temp greater or equal to 38C (100.4F), Mild Pain (1 - 3)  ALBUTerol    90 MICROgram(s) HFA Inhaler 2 Puff(s) Inhalation every 6 hours PRN Shortness of Breath and/or Wheezing  aluminum hydroxide/magnesium hydroxide/simethicone Suspension 30 milliLiter(s) Oral every 4 hours PRN Dyspepsia  hydrocodone/homatropine Syrup 5 milliLiter(s) Oral every 6 hours PRN for cough  melatonin 3 milliGRAM(s) Oral at bedtime PRN Insomnia  ondansetron Injectable 4 milliGRAM(s) IV Push every 8 hours PRN Nausea and/or Vomiting  traMADol 25 milliGRAM(s) Oral every 8 hours PRN Moderate Pain (4 - 6)      ALLERGIES: adhesives (Hives)  Advair Diskus (Rash)  Flovent (Rash)  Percocet 10/325 (Pruritus; Hives)  Pulmicort Flexhaler (Rash)      FAMILY HISTORY:  Family history of hypertension (Father, Mother)  1 daughter alive with HTN    Family history of diabetes mellitus (Father, Mother)    Family history of colon cancer (Father)    Family history of congenital heart disease (Sibling)    Family history of lung cancer (Child)    Family history of thyroid disorder (Child)  1 daughter    FH: pancreatic cancer (Father)        Social History:  Alochol:   Smoking:   Drug Use:   Marital Status:     I&O's Detail      PHYSICAL EXAMINATION:  -----------------------------  T(C): 36.8 (05-08-22 @ 07:38), Max: 36.8 (05-08-22 @ 07:38)  HR: 101 (05-08-22 @ 07:38) (87 - 111)  BP: 142/85 (05-08-22 @ 07:38) (70/47 - 149/82)  RR: 18 (05-08-22 @ 07:38) (17 - 18)  SpO2: 92% (05-08-22 @ 07:38) (92% - 96%)  Wt(kg): --    Height (cm): 157.5 (05-08 @ 00:10)  Weight (kg): 60.8 (05-08 @ 00:10)  BMI (kg/m2): 24.5 (05-08 @ 00:10)  BSA (m2): 1.61 (05-08 @ 00:10)    Constitutional: well developed, normal appearance, well groomed, well nourished, no deformities and no acute distress.   Eyes: the conjunctiva exhibited no abnormalities and the eyelids demonstrated no xanthelasmas.   HEENT: normal oral mucosa, no oral pallor and no oral cyanosis.   Neck: normal jugular venous A waves present, normal jugular venous V waves present and no jugular venous pope A waves.   Pulmonary: no respiratory distress, normal respiratory rhythm and effort, no accessory muscle use and lungs were clear to auscultation bilaterally.   Cardiovascular: heart rate and rhythm were normal, normal S1 and S2 and no gallop, rub, heave or thrill are present. with II/VI systolic murmur  Musculoskeletal: the gait could not be assessed.   Extremities: no clubbing of the fingernails, no localized cyanosis, no petechial hemorrhages and no ischemic changes. B/L 1-Plus edema  Skin: normal skin color and pigmentation, no rash, no venous stasis, no skin lesions, no skin ulcer and no xanthoma was observed.   Psychiatric: oriented to person, place, and time, the affect was normal, the mood was normal and not feeling anxious.     LABS:   --------  05-08    137  |  103  |  30<H>  ----------------------------<  115<H>  4.3   |  26  |  1.00    Ca    9.7      08 May 2022 00:54    TPro  6.5  /  Alb  3.6  /  TBili  0.4  /  DBili  x   /  AST  29  /  ALT  26  /  AlkPhos  95  05-08                         13.1   12.22 )-----------( 540      ( 08 May 2022 00:54 )             40.2     PT/INR - ( 08 May 2022 00:54 )   PT: 12.2 sec;   INR: 1.03 ratio         PTT - ( 08 May 2022 00:54 )  PTT:25.1 sec              RADIOLOGY:  -----------------        ECG: NSR
Date/Time Patient Seen:  		  Referring MD:   Data Reviewed	       Patient is a 86y old  Female who presents with a chief complaint of fall -> right femoral fracture (08 May 2022 02:49)      Subjective/HPI    in bed  seen and examined  vs noted  labs reviewed  H and P reviewed  ER provider note reviewed  Imaging reviewed     86F with hx of lung CA (s/p CELESTE resection) and breast CA (s/p bilateral mastectomies and tamoxifen), Escobar's esophagus, HTN, asthma and COPD, PAD who presents with right hip pain after a fall.  Patient was in her usual state of health today and was at a wedding when she attempted to sit on a chair and fell.  Patient slid out of her chair and fell on her right side.  No LOC or head trauma.  No chest pain or SOB prior or after fall.  Patient came here for further evaluation where she was discovered to have a right intertrochanteric fracture.  Prior to fall, patient was in her usual state of health which included a chronic cough, chronic BLE edema (controlled on torsemide), chronic CORDERO when walking with a walker.  No new or acute symptoms.        PAST MEDICAL & SURGICAL HISTORY:  Hypertension    Asthma    Breast cancer  H/o 10/1998    Vasovagal syndrome    Lymph edema  right - NO IV NO BLOOD DRAW, NO B/P RIGHT ARM    Barretts esophagus    COPD (chronic obstructive pulmonary disease)    Abnormal lung field    Cataract  Bilateral    Thyroid nodule    Lung cancer    S/P mastectomy, bilateral  in 1998    Deviated nasal septum  had surgery in 1994    S/P cataract surgery  bilateral in 2009    S/P wrist surgery  right in 2012    Cataract  2009 B/l    H/O arthroplasty  right knee replacement 2013    H/O arthroscopy of left knee  2013    S/P D&amp;C (status post dilation and curettage)  Endometrial bx 2012    History of lung surgery  Left lng wedge resection   7/6/15    FAMILY HISTORY:  Father  Still living? No  Family history of colon cancer, Age at diagnosis: 71-80  Family history of diabetes mellitus, Age at diagnosis: Age Unknown  Family history of hypertension, Age at diagnosis: Age Unknown  FH: pancreatic cancer, Age at diagnosis: Age Unknown    Mother  Still living? Yes, Estimated age: Age Unknown  Family history of diabetes mellitus, Age at diagnosis: Age Unknown  Family history of hypertension, Age at diagnosis: Age Unknown    Sibling  Still living? Yes, Estimated age: Age Unknown  Family history of congenital heart disease, Age at diagnosis: Age Unknown    Child  Still living? Yes, Estimated age: Age Unknown  Family history of lung cancer, Age at diagnosis: Age Unknown  Family history of thyroid disorder, Age at diagnosis: Age Unknown.     Social History:  Social History (marital status, living situation, occupation, tobacco use, alcohol and drug use, and sexual history): - no smoking, etoh use, or illegal drug use  - walks with a walker     Tobacco Screening:  · Core Measure Site	No  · Has the patient used tobacco in the past 30 days?	No     Risk Assessment:    Present on Admission:  Deep Venous Thrombosis	no   Pulmonary Embolus	no      Heart Failure:  Does this patient have a history of or has been diagnosed with heart failure? unknown.        Medication list         MEDICATIONS  (STANDING):  atorvastatin 10 milliGRAM(s) Oral at bedtime  metoprolol succinate ER 25 milliGRAM(s) Oral daily  mometasone 220 MICROgram(s) Inhaler 1 Puff(s) Inhalation daily  montelukast 10 milliGRAM(s) Oral daily  NIFEdipine XL 30 milliGRAM(s) Oral daily  pantoprazole    Tablet 40 milliGRAM(s) Oral before breakfast  tiotropium 18 MICROgram(s) Capsule 1 Capsule(s) Inhalation daily    MEDICATIONS  (PRN):  ALBUTerol    90 MICROgram(s) HFA Inhaler 2 Puff(s) Inhalation every 6 hours PRN Shortness of Breath and/or Wheezing  aluminum hydroxide/magnesium hydroxide/simethicone Suspension 30 milliLiter(s) Oral every 4 hours PRN Dyspepsia  hydrocodone/homatropine Syrup 5 milliLiter(s) Oral every 6 hours PRN for cough  melatonin 3 milliGRAM(s) Oral at bedtime PRN Insomnia  morphine  - Injectable 2 milliGRAM(s) IV Push every 4 hours PRN Mild Pain (1 - 3)  morphine  - Injectable 4 milliGRAM(s) IV Push every 4 hours PRN Moderate Pain (4 - 6)  ondansetron Injectable 4 milliGRAM(s) IV Push every 8 hours PRN Nausea and/or Vomiting         Vitals log        ICU Vital Signs Last 24 Hrs  T(C): 36.6 (08 May 2022 03:45), Max: 36.6 (08 May 2022 03:45)  T(F): 97.8 (08 May 2022 03:45), Max: 97.8 (08 May 2022 03:45)  HR: 111 (08 May 2022 05:47) (87 - 111)  BP: 149/82 (08 May 2022 05:47) (70/47 - 149/82)  BP(mean): --  ABP: --  ABP(mean): --  RR: 17 (08 May 2022 03:45) (17 - 18)  SpO2: 93% (08 May 2022 03:45) (93% - 96%)           Input and Output:  I&O's Detail      Lab Data                        13.1   12.22 )-----------( 540      ( 08 May 2022 00:54 )             40.2     05-08    137  |  103  |  30<H>  ----------------------------<  115<H>  4.3   |  26  |  1.00    Ca    9.7      08 May 2022 00:54    TPro  6.5  /  Alb  3.6  /  TBili  0.4  /  DBili  x   /  AST  29  /  ALT  26  /  AlkPhos  95  05-08            Review of Systems	  fall  pain    Objective     Physical Examination    heart s1s2  lung dec BS  abd soft      Pertinent Lab findings & Imaging      [place]:  NO   Adequate UO     I&O's Detail           Discussed with:     Cultures:	        Radiology      EXAM:  XR CHEST PORTABLE URGENT 1V                            PROCEDURE DATE:  11/10/2021          INTERPRETATION:  Chest portable.    Clinical History: Chest pain.    Comparison: 11/4/2021.    Single AP view submitted.  The patient is rotated.    The left lung apex is partially obscured by the patient's head and chin.    The evaluation of the cardiomediastinal silhouette is limited on portable technique.  Calcified aorta.    Possible postsurgical changes at the left lung apex.  Relative paucity of bronchovascular markings in the upper lung zones and mild prominence the bronchovascular markings in the bilateral lower lung zones, findings may reflect emphysematous disease.    Impression:    Findings as discussed above.    --- End of Report ---            ABNER TORRES MD; Attending Radiologist  This document has been electronically signed. Nov 11 2021  3:30PM        EXAM:  CT ANGIO CHEST PULM [facility]                            PROCEDURE DATE:  11/04/2021          INTERPRETATION:  CTA CHEST    INDICATION: Tachycardia and palpitations    TECHNIQUE: Axial images of the chest were obtained after the administration of 40 mL of Omnipaque 350. Maximum intensity projection images were generated.    COMPARISON: CT chest 8/25/2021.    FINDINGS:    Pulmonary arteries:  Normal caliber main pulmonary artery. No pulmonary embolism    Lungs, airways and pleura: Left upper lobectomy. Patent airways to the segmental bronchi. Stable mild mucoid impaction in the middle lobe and small right lower lobe intrapulmonary lymph node. Unchanged mild interlobular septal thickening in the middle lobe. Unremarkable pleura.    Lymph nodes and mediastinum: No enlarged lymph nodes. Unremarkable thyroid.    Heart, pericardium, and vasculature: Normal heart size. No pericardial effusion. Normal caliber aorta.    Bones and soft tissues: Degenerative changes of the spine.    Other: Partially included small right kidney cyst.      IMPRESSION:    No pulmonary embolism or other acute pulmonary process.    --- End of Report ---            WALE AKBAR M.D., ATTENDING RADIOGIST  This document has been electronically signed. Nov 4 2021  6:41PM

## 2022-05-11 NOTE — DISCHARGE NOTE PROVIDER - CARE PROVIDERS DIRECT ADDRESSES
,DirectAddress_Unknown ,DirectAddress_Unknown,[email or web address]

## 2022-05-11 NOTE — DISCHARGE NOTE PROVIDER - PROVIDER TOKENS
PROVIDER:[TOKEN:[[id number]],FOLLOWUP:[2 weeks]] PROVIDER:[TOKEN:[[id number]],FOLLOWUP:[2 weeks]],PROVIDER:[TOKEN:[[id number]],FOLLOWUP:[1 week]]

## 2022-05-11 NOTE — DISCHARGE NOTE PROVIDER - NSDCFUADDAPPT_GEN_ALL_CORE_FT
Call Dr Alamguer's office for follow up in 2 weeks.  It is advisable to follow up w/ your pcp in 2-3 weeks to be sure there are no underlying problems.

## 2022-05-11 NOTE — DISCHARGE NOTE PROVIDER - INSTRUCTIONS
For Constipation :   • Increase your water intake. Drink at least 8 glasses of water daily.  • Try adding fiber to your diet by eating fruits, vegetables and foods that are rich in grains.  • If you do experience constipation, you may take an over-the-counter stool softener/laxative such as Lesley Colace, Senekot, miralax or  Milk of Magnesia.

## 2022-05-11 NOTE — PROGRESS NOTE ADULT - SUBJECTIVE AND OBJECTIVE BOX
Chief Complaint: Fall    Interval Events: No events overnight.    Review of Systems:  General: No fevers, chills, weight gain  Skin: No rashes, color changes  Cardiovascular: No chest pain, orthopnea  Respiratory: No shortness of breath, cough  Gastrointestinal: No nausea, abdominal pain  Genitourinary: No incontinence, pain with urination  Musculoskeletal: +pain, swelling, decreased range of motion  Neurological: No headache, weakness  Psychiatric: No depression, anxiety  Endocrine: No weight gain, increased thirst  All other systems are comprehensively negative.    Physical Exam:  Vital Signs Last 24 Hrs  T(C): 36.7 (11 May 2022 07:54), Max: 37.1 (11 May 2022 00:15)  T(F): 98.1 (11 May 2022 07:54), Max: 98.8 (11 May 2022 00:15)  HR: 89 (11 May 2022 07:54) (79 - 97)  BP: 112/69 (11 May 2022 07:54) (103/68 - 118/65)  BP(mean): --  RR: 20 (11 May 2022 07:54) (17 - 20)  SpO2: 92% (11 May 2022 07:54) (90% - 93%)  General: NAD  HEENT: MMM  Neck: No JVD, no carotid bruit  Lungs: CTAB  CV: RRR, nl S1/S2, no M/R/G  Abdomen: S/NT/ND, +BS  Extremities: No LE edema, no cyanosis  Neuro: AAOx3, non-focal  Skin: No rash    Labs:    05-11    134<L>  |  100  |  21  ----------------------------<  94  4.2   |  26  |  0.88    Ca    9.2      11 May 2022 06:30                          12.9   14.31 )-----------( 590      ( 11 May 2022 06:30 )             39.6

## 2022-05-11 NOTE — PROGRESS NOTE ADULT - ASSESSMENT
86F with hx of lung CA (s/p CELESTE resection) and breast CA (s/p bilateral mastectomies and tamoxifen), Escobar's esophagus, HTN, asthma and COPD, PAD who presents with right hip pain after a fall.   Found to have a right intertrochanteric fracture.      Right intertrochanteric fracture   S/P open reduction internal fixation hip right on 5/9  -doing well post op-EKG NSR nonspecific changes  -pain control per ortho  -dvt ppx: lovenox 40mg daily x 14 days per ortho protocol post fracture    Abnormal Urinalysis: false positive with epithelial cells taking from canister, not a clean cath  -difficult to obtain straight cath give her fracture and no UTI symptoms so will hold off for now, reassess as needed  Leukocytosis: reactive to stress, fracture, surgery no s/s of infection  -UA as above, not clean catch and no UTI symptoms  -afebrile,   - ID consulted, f/u reccs     HTN:   BP has been trending lower and did drop this morning, gentle hydration   - c/w metoprolol XL, diuretic with hold parameters  - HOLD ARB and Nifedipine. May need to adjust BP regimen outpatient if BP remains low  - restarted ASA and losartan post op per cards/ortho  - cont with atorvastatin    Lymphedema  - Cont diuretic     Asthma/COPD  - Qvar because shes allergic to other inhalers  - cont with Montelukast  - cont with albuterol inh  - cont with spiriva   -pulm following     Barrette's esophagus  - cont with pantoprazole    Preventive measures  lovenox    dispo: d/c to [facility] when placed

## 2022-05-11 NOTE — DISCHARGE NOTE NURSING/CASE MANAGEMENT/SOCIAL WORK - PATIENT PORTAL LINK FT
You can access the FollowMyHealth Patient Portal offered by [facility] by registering at the following website: [email or web address]. By joining [email or web address] FollowMyHealth portal, you will also be able to view your health information using other applications (apps) compatible with our system.

## 2022-05-12 LAB
ANION GAP SERPL CALC-SCNC: 5 MMOL/L — SIGNIFICANT CHANGE UP (ref 5–17)
BUN SERPL-MCNC: 18 MG/DL — SIGNIFICANT CHANGE UP (ref 7–23)
CALCIUM SERPL-MCNC: 9.2 MG/DL — SIGNIFICANT CHANGE UP (ref 8.4–10.5)
CHLORIDE SERPL-SCNC: 104 MMOL/L — SIGNIFICANT CHANGE UP (ref 96–108)
CO2 SERPL-SCNC: 30 MMOL/L — SIGNIFICANT CHANGE UP (ref 22–31)
CREAT SERPL-MCNC: 0.66 MG/DL — SIGNIFICANT CHANGE UP (ref 0.5–1.3)
EGFR: 85 ML/MIN/1.73M2 — SIGNIFICANT CHANGE UP
GLUCOSE SERPL-MCNC: 96 MG/DL — SIGNIFICANT CHANGE UP (ref 70–99)
HCT VFR BLD CALC: 36.3 % — SIGNIFICANT CHANGE UP (ref 34.5–45)
HGB BLD-MCNC: 11.7 G/DL — SIGNIFICANT CHANGE UP (ref 11.5–15.5)
MCHC RBC-ENTMCNC: 29.1 PG — SIGNIFICANT CHANGE UP (ref 27–34)
MCHC RBC-ENTMCNC: 32.2 GM/DL — SIGNIFICANT CHANGE UP (ref 32–36)
MCV RBC AUTO: 90.3 FL — SIGNIFICANT CHANGE UP (ref 80–100)
NRBC # BLD: 0 /100 WBCS — SIGNIFICANT CHANGE UP (ref 0–0)
PLATELET # BLD AUTO: 595 K/UL — HIGH (ref 150–400)
POTASSIUM SERPL-MCNC: 4.3 MMOL/L — SIGNIFICANT CHANGE UP (ref 3.5–5.3)
POTASSIUM SERPL-SCNC: 4.3 MMOL/L — SIGNIFICANT CHANGE UP (ref 3.5–5.3)
RBC # BLD: 4.02 M/UL — SIGNIFICANT CHANGE UP (ref 3.8–5.2)
RBC # FLD: 15.3 % — HIGH (ref 10.3–14.5)
SODIUM SERPL-SCNC: 139 MMOL/L — SIGNIFICANT CHANGE UP (ref 135–145)
WBC # BLD: 10.31 K/UL — SIGNIFICANT CHANGE UP (ref 3.8–10.5)
WBC # FLD AUTO: 10.31 K/UL — SIGNIFICANT CHANGE UP (ref 3.8–10.5)

## 2022-05-12 PROCEDURE — 99233 SBSQ HOSP IP/OBS HIGH 50: CPT

## 2022-05-12 RX ORDER — SODIUM CHLORIDE 9 MG/ML
1000 INJECTION INTRAMUSCULAR; INTRAVENOUS; SUBCUTANEOUS
Refills: 0 | Status: DISCONTINUED | OUTPATIENT
Start: 2022-05-12 | End: 2022-05-13

## 2022-05-12 RX ORDER — FAMOTIDINE 10 MG/ML
1 INJECTION INTRAVENOUS
Qty: 0 | Refills: 0 | DISCHARGE

## 2022-05-12 RX ORDER — SODIUM CHLORIDE 9 MG/ML
500 INJECTION INTRAMUSCULAR; INTRAVENOUS; SUBCUTANEOUS ONCE
Refills: 0 | Status: COMPLETED | OUTPATIENT
Start: 2022-05-12 | End: 2022-05-12

## 2022-05-12 RX ORDER — BECLOMETHASONE DIPROPIONATE 40 UG/1
3 AEROSOL, METERED RESPIRATORY (INHALATION) EVERY 12 HOURS
Refills: 0 | Status: DISCONTINUED | OUTPATIENT
Start: 2022-05-12 | End: 2022-05-13

## 2022-05-12 RX ADMIN — MONTELUKAST 10 MILLIGRAM(S): 4 TABLET, CHEWABLE ORAL at 12:30

## 2022-05-12 RX ADMIN — TRAMADOL HYDROCHLORIDE 50 MILLIGRAM(S): 50 TABLET ORAL at 00:53

## 2022-05-12 RX ADMIN — SODIUM CHLORIDE 500 MILLILITER(S): 9 INJECTION INTRAMUSCULAR; INTRAVENOUS; SUBCUTANEOUS at 14:59

## 2022-05-12 RX ADMIN — Medication 1 SPRAY(S): at 06:34

## 2022-05-12 RX ADMIN — CELECOXIB 200 MILLIGRAM(S): 200 CAPSULE ORAL at 08:51

## 2022-05-12 RX ADMIN — ENOXAPARIN SODIUM 40 MILLIGRAM(S): 100 INJECTION SUBCUTANEOUS at 08:50

## 2022-05-12 RX ADMIN — ATORVASTATIN CALCIUM 10 MILLIGRAM(S): 80 TABLET, FILM COATED ORAL at 21:33

## 2022-05-12 RX ADMIN — CELECOXIB 200 MILLIGRAM(S): 200 CAPSULE ORAL at 21:32

## 2022-05-12 RX ADMIN — Medication 1000 MILLIGRAM(S): at 06:38

## 2022-05-12 RX ADMIN — Medication 1000 MILLIGRAM(S): at 06:31

## 2022-05-12 RX ADMIN — Medication 81 MILLIGRAM(S): at 06:31

## 2022-05-12 RX ADMIN — TIOTROPIUM BROMIDE 1 CAPSULE(S): 18 CAPSULE ORAL; RESPIRATORY (INHALATION) at 06:32

## 2022-05-12 RX ADMIN — CELECOXIB 200 MILLIGRAM(S): 200 CAPSULE ORAL at 09:30

## 2022-05-12 RX ADMIN — PANTOPRAZOLE SODIUM 40 MILLIGRAM(S): 20 TABLET, DELAYED RELEASE ORAL at 06:32

## 2022-05-12 RX ADMIN — Medication 1 SPRAY(S): at 17:58

## 2022-05-12 RX ADMIN — Medication 25 MILLIGRAM(S): at 06:31

## 2022-05-12 RX ADMIN — BECLOMETHASONE DIPROPIONATE 3 PUFF(S): 40 AEROSOL, METERED RESPIRATORY (INHALATION) at 21:33

## 2022-05-12 RX ADMIN — SENNA PLUS 2 TABLET(S): 8.6 TABLET ORAL at 21:31

## 2022-05-12 RX ADMIN — Medication 1000 MILLIGRAM(S): at 21:33

## 2022-05-12 RX ADMIN — Medication 5 MILLIGRAM(S): at 06:32

## 2022-05-12 RX ADMIN — PANTOPRAZOLE SODIUM 40 MILLIGRAM(S): 20 TABLET, DELAYED RELEASE ORAL at 17:57

## 2022-05-12 RX ADMIN — CEFTRIAXONE 100 MILLIGRAM(S): 500 INJECTION, POWDER, FOR SOLUTION INTRAMUSCULAR; INTRAVENOUS at 12:30

## 2022-05-12 RX ADMIN — TRAMADOL HYDROCHLORIDE 50 MILLIGRAM(S): 50 TABLET ORAL at 01:30

## 2022-05-12 RX ADMIN — Medication 1000 MILLIGRAM(S): at 14:18

## 2022-05-12 NOTE — PROGRESS NOTE ADULT - SUBJECTIVE AND OBJECTIVE BOX
Procedure: ORIF Right   Hip Fx with gamma nail  POD#: 3    S: Pt without complaints. No SOB,CP, N/V. Tolerated Fluids / Diet well.   Pain comfortable on tylenol and celebrex.  Rare use of tramadol.  +voiding and BM.  acetaminophen     Tablet .. 1000 milliGRAM(s) Oral every 8 hours  celecoxib 200 milliGRAM(s) Oral every 12 hours  melatonin 3 milliGRAM(s) Oral at bedtime PRN  traMADol 50 milliGRAM(s) Oral every 4 hours PRN  traMADol 100 milliGRAM(s) Oral every 6 hours PRN    O: General: Pt Alert and oriented, On exam NAD,   VS: Vital Signs Last 24 Hrs  T(C): 36.7 (11 May 2022 23:30), Max: 37.1 (11 May 2022 15:17)  T(F): 98 (11 May 2022 23:30), Max: 98.7 (11 May 2022 15:17)  HR: 86 (12 May 2022 06:29) (86 - 101)  BP: 125/77 (12 May 2022 06:29) (64/41 - 125/77)  BP(mean): --  RR: 18 (11 May 2022 23:30) (18 - 20)  SpO2: 94% (11 May 2022 23:30) (92% - 95%)  Lungs: BS clear bilat.  Heart: RRR no murmur  Abd: BS+ soft  Ext( Right hip silverlon dressing w/ slight bloody drainage--will change dressing prior to d/c.    Neurologic: Has sensation bilat. feet & toes ;  Full AROM bilat feet & toes. EHL / AT  = Bilat: 5/5   Vascular: Feet toes warm, pink. DP = 2+. Calves soft ; w/o tenderness bilat..  VTEP: On Bilat. Venodynes + aspirin enteric coated 81 milliGRAM(s) Oral daily  enoxaparin Injectable 40 milliGRAM(s) SubCutaneous every 24 hours     Activity in PT yesterday : walked 5'                          12.9   14.31 )-----------( 590      ( 11 May 2022 06:30 )             39.6     05-11    134<L>  |  100  |  21  ----------------------------<  94  4.2   |  26  |  0.88    Ca    9.2      11 May 2022 06:30        Hospitalist input noted    Primary Orthopedic Assessment:  • Stable from Orthopedic perspective  • Neuro motor exam stable  • Labs: stable.  Today's labs pending      Plan:   • Continue:  PT/OT/Weightbearing as tolerated with assistance of a walker/Ice to hip/          Incentive spirometry encouraged /   • Continue DVT prophylaxis as prescribed, including use of compression devices and ankle pumps  • Continue Pain Rx  • Plans per Medicine  • Discharge planning – anticipated discharge is:  Subacute rehabilitation  when medically stable --today     Procedure: ORIF Right   Hip Fx with gamma nail  POD#: 3    S: Pt without complaints. No SOB,CP, N/V. Tolerated Fluids / Diet well.   Pain comfortable on tylenol and celebrex.  Rare use of tramadol.  +voiding and BM.  acetaminophen     Tablet .. 1000 milliGRAM(s) Oral every 8 hours  celecoxib 200 milliGRAM(s) Oral every 12 hours  melatonin 3 milliGRAM(s) Oral at bedtime PRN  traMADol 50 milliGRAM(s) Oral every 4 hours PRN  traMADol 100 milliGRAM(s) Oral every 6 hours PRN    O: General: Pt Alert and oriented, On exam NAD,   VS: Vital Signs Last 24 Hrs  T(C): 36.7 (11 May 2022 23:30), Max: 37.1 (11 May 2022 15:17)  T(F): 98 (11 May 2022 23:30), Max: 98.7 (11 May 2022 15:17)  HR: 86 (12 May 2022 06:29) (86 - 101)  BP: 125/77 (12 May 2022 06:29) (64/41 - 125/77)  BP(mean): --  RR: 18 (11 May 2022 23:30) (18 - 20)  SpO2: 94% (11 May 2022 23:30) (92% - 95%)  Lungs: BS clear bilat.  Heart: RRR no murmur  Abd: BS+ soft  Ext( Right hip silverlon dressing w/ slight bloody drainage--changed.  wounds clean and dry.  no active drainage.  new silverlon applied.    Neurologic: Has sensation bilat. feet & toes ;  Full AROM bilat feet & toes. EHL / AT  = Bilat: 5/5   Vascular: Feet toes warm, pink. DP = 2+. Calves soft ; w/o tenderness bilat..  VTEP: On Bilat. Venodynes + aspirin enteric coated 81 milliGRAM(s) Oral daily  enoxaparin Injectable 40 milliGRAM(s) SubCutaneous every 24 hours     Activity in PT yesterday : walked 5'                          12.9   14.31 )-----------( 590      ( 11 May 2022 06:30 )             39.6     05-11    134<L>  |  100  |  21  ----------------------------<  94  4.2   |  26  |  0.88    Ca    9.2      11 May 2022 06:30        Hospitalist input noted    Primary Orthopedic Assessment:  • Stable from Orthopedic perspective  • Neuro motor exam stable  • Labs: stable.  Today's labs pending      Plan:   • Continue:  PT/OT/Weightbearing as tolerated with assistance of a walker/Ice to hip/          Incentive spirometry encouraged /   • Continue DVT prophylaxis as prescribed, including use of compression devices and ankle pumps  • Continue Pain Rx  • Plans per Medicine  • Discharge planning – anticipated discharge is:  Subacute rehabilitation  when medically stable --today

## 2022-05-12 NOTE — PROGRESS NOTE ADULT - ASSESSMENT
86F with hx of lung CA (s/p CELESTE resection) and breast CA (s/p bilateral mastectomies and tamoxifen), Escobar's esophagus, HTN, asthma and COPD, PAD who presents with right hip pain after a fall.   Found to have a right intertrochanteric fracture.      Possible Acute Cystitis  UA reviewed: +epithelial cells taking from canister, not a clean catch; unable to obtain clear sample.   However pt reporting increased shakiness and some lightheadedness; no urinary sx.   Pt also reporting hx of frequent UTIs in the past  At this time will opt to treat w/ short course of Abx.   Plan:   UCx if possible to obtain clean sample, otherwise will empirically treat  Started pt on ceftriaxone 1gm q24h, switch to PO cefpodoxime 100mg BID to complete x3 day course w/ last day of Abx on 5/13    Right intertrochanteric fracture  S/P open reduction internal fixation hip right on 5/9  Ortho f/u  Pending rehab    D/c planning per primary team  D/w Dr. Sierra    Infectious Diseases will continue to follow. Please call with any questions.   Nolvia Jean M.D.  [facility], Division of Infectious Diseases [phone]   86F with hx of lung CA (s/p CELESTE resection) and breast CA (s/p bilateral mastectomies and tamoxifen), Escobar's esophagus, HTN, asthma and COPD, PAD who presents with right hip pain after a fall.   Found to have a right intertrochanteric fracture.      Possible Acute Cystitis  UA reviewed: +epithelial cells taking from canister, not a clean catch; unable to obtain clear sample.   However pt reporting increased shakiness and some lightheadedness; no urinary sx.   Pt also reporting hx of frequent UTIs in the past  At this time will opt to treat w/ short course of Abx.   Plan:   UCx if possible to obtain clean sample, otherwise will empirically treat  C/w ceftriaxone 1gm q24h, switch to PO cefpodoxime 100mg BID to complete x3 day course w/ last day of Abx on 5/13    Right intertrochanteric fracture  S/P open reduction internal fixation hip right on 5/9  Ortho f/u  Pending rehab    D/c planning per primary team  D/w Dr. Sierra    Infectious Diseases will continue to follow. Please call with any questions.   Nolvia Jean M.D.  [facility], Division of Infectious Diseases [phone]

## 2022-05-12 NOTE — PROGRESS NOTE ADULT - ASSESSMENT
86F with hx of lung CA (s/p CELESTE resection) and breast CA (s/p bilateral mastectomies and tamoxifen), Escobar's esophagus, HTN, asthma and COPD, PAD who presents with right hip pain after a fall.   Found to have a right intertrochanteric fracture.      lung ca  breast ca  GERD  FALL  Hip Fx  Asthma  COPD  PAD    [facility]  TTE grossly normal -   right Hip ORIF - POD 3  VS noted -   ID eval noted - on Rocephin     pt with asthma copd emphysema - hx of Lung Ca and lobectomy in the past -   pt follows with Dr ANDRÉS Morales in the office for Pulm Medicine -   cont Spiriva - Singulair - Asmanex - Proventil PRN  Imaging and Labs reviewed  old records reviewed  prior operation on Lung with Dr Aguilar at [facility]  I angel  pain assessment  assist with needs  outpatient records reviewed

## 2022-05-12 NOTE — PROGRESS NOTE ADULT - ASSESSMENT
86F with hx of lung CA (s/p CELESTE resection) and breast CA (s/p bilateral mastectomies and tamoxifen), Escobar's esophagus, HTN, asthma and COPD, PAD who presents with right hip pain after a fall.   Found to have a right intertrochanteric fracture.      Right intertrochanteric fracture   S/P open reduction internal fixation hip right on 5/9  -doing well post op-EKG NSR nonspecific changes  -pain control per ortho  -dvt ppx: lovenox 40mg daily x 14 days per ortho protocol post fracture    Abnormal Urinalysis: false positive with epithelial cells taking from canister, not a clean cath  -difficult to obtain straight cath give her fracture and no UTI symptoms so will hold off for now, reassess as needed  Leukocytosis: reactive to stress, fracture, surgery no s/s of infection  -UA as above, not clean catch and no UTI symptoms  -afebrile,   - ID consulted, started on Rocephin     HTN:   BP has been trending lower and did drop this morning, gentle hydration   - c/w metoprolol XL  - HOLD ARB, diuretic and Nifedipine. May need to adjust BP regimen outpatient if BP remains low  - restarted ASA and losartan post op per cards/ortho  - cont with atorvastatin    Lymphedema  - Cont diuretic     Asthma/COPD  - Qvar because shes allergic to other inhalers  - cont with Montelukast  - cont with albuterol inh  - cont with spiriva   -pulm following     Barrette's esophagus  - cont with pantoprazole    Preventive measures  lovenox    dispo: would observe for another day as patient is symptomatic with ambulation

## 2022-05-12 NOTE — PROGRESS NOTE ADULT - SUBJECTIVE AND OBJECTIVE BOX
Chief Complaint: Fall    Interval Events: No events overnight.    Review of Systems:  General: No fevers, chills, weight gain  Skin: No rashes, color changes  Cardiovascular: No chest pain, orthopnea  Respiratory: No shortness of breath, cough  Gastrointestinal: No nausea, abdominal pain  Genitourinary: No incontinence, pain with urination  Musculoskeletal: +pain, swelling, decreased range of motion  Neurological: No headache, weakness  Psychiatric: No depression, anxiety  Endocrine: No weight gain, increased thirst  All other systems are comprehensively negative.    Physical Exam:  Vital Signs Last 24 Hrs  T(C): 36.8 (12 May 2022 07:53), Max: 37.1 (11 May 2022 15:17)  T(F): 98.3 (12 May 2022 07:53), Max: 98.7 (11 May 2022 15:17)  HR: 78 (12 May 2022 07:53) (78 - 101)  BP: 144/79 (12 May 2022 07:53) (110/69 - 144/79)  BP(mean): --  RR: 18 (12 May 2022 07:53) (18 - 20)  SpO2: 95% (12 May 2022 07:53) (93% - 95%)  General: NAD  HEENT: MMM  Neck: No JVD, no carotid bruit  Lungs: CTAB  CV: RRR, nl S1/S2, no M/R/G  Abdomen: S/NT/ND, +BS  Extremities: No LE edema, no cyanosis  Neuro: AAOx3, non-focal  Skin: No rash    Labs:    05-12    139  |  104  |  18  ----------------------------<  96  4.3   |  30  |  0.66    Ca    9.2      12 May 2022 08:20                          11.7   10.31 )-----------( 595      ( 12 May 2022 08:20 )             36.3

## 2022-05-12 NOTE — PROGRESS NOTE ADULT - ASSESSMENT
The patient is an 86 year old female with a history of HTN, HL, chronic leg swelling, lung cancer s/p resection, COPD, vasovagal syncope who is admitted with a hip fracture.    Plan:  - ECG with no evidence of ischemia or infarction  - Echo with normal LV systolic function, no significant valve issues  - Continue metoprolol succinate 25 mg daily  - Continue atorvastatin 10 mg daily  - Continue aspirin 81 mg daily  - Orthostatics were significantly positive yesterday - nifedipine and losartan held. If orthostatics negative today, can resume nifedipine (as she uses it for Raynaud's), although would hold losartan.  - Ortho follow-up  - PT

## 2022-05-12 NOTE — DIETITIAN INITIAL EVALUATION ADULT - OTHER INFO
Per H&P, pt is a "86F with hx of lung CA (s/p CELESTE resection) and breast CA (s/p bilateral mastectomies and tamoxifen), Escobar's esophagus, HTN, asthma and COPD, PAD who presents with right hip pain after a fall.  Patient was in her usual state of health today and was at a wedding when she attempted to sit on a chair and fell.  Patient slid out of her chair and fell on her right side.  No LOC or head trauma.  No chest pain or SOB prior or after fall.  Patient came here for further evaluation where she was discovered to have a right intertrochanteric fracture.  Prior to fall, patient was in her usual state of health which included a chronic cough, chronic BLE edema (controlled on torsemide), chronic CORDERO when walking with a walker.  No new or acute symptoms."    Pt visited while sitting up in bed.  Pt on regular diet with reported good appetite and intake.  Meal preferences obtained daily to optimize po intake and tolerance.  Denies chewing or swallowing difficulties.  PTA pt reports she lives at home alone, has dtr who lives nearby.  Reports she doesnt add salt when cooking.  Discharge plan is to rehab, noted was pending d/c today, however, had low bp with ambulation.  Per pt, ht ~5'1.5", #, generally stable/no signficant wt changes PTA.  Per comprehensive chart review, pt's wt documented as 141# in November 2021, indicating 4.9% wt loss x 7 months (not clinically signficant); pt also with hx chronic BLE edema per h&p.  Pt denies nutrition related questions or concerns at time of visit.

## 2022-05-12 NOTE — PROGRESS NOTE ADULT - SUBJECTIVE AND OBJECTIVE BOX
Date/Time Patient Seen:  		  Referring MD:   Data Reviewed	       Patient is a 86y old  Female who presents with a chief complaint of fall -> right femoral fracture (11 May 2022 11:06)      Subjective/HPI     PAST MEDICAL & SURGICAL HISTORY:  Hypertension    Asthma    Breast cancer  H/o 10/1998    Vasovagal syndrome    Lymph edema  right - NO IV NO BLOOD DRAW, NO B/P RIGHT ARM    Barretts esophagus    COPD (chronic obstructive pulmonary disease)    Abnormal lung field    Cataract  Bilateral    Thyroid nodule    Lung cancer    S/P mastectomy, bilateral  in 1998    Deviated nasal septum  had surgery in 1994    S/P cataract surgery  bilateral in 2009    S/P wrist surgery  right in 2012    Cataract  2009 B/l    H/O arthroplasty  right knee replacement 2013    H/O arthroscopy of left knee  2013    S/P D&amp;C (status post dilation and curettage)  Endometrial bx 2012    History of lung surgery  Left lng wedge resection   7/6/15          Medication list         MEDICATIONS  (STANDING):  acetaminophen     Tablet .. 1000 milliGRAM(s) Oral every 8 hours  aspirin enteric coated 81 milliGRAM(s) Oral daily  atorvastatin 10 milliGRAM(s) Oral at bedtime  cefTRIAXone   IVPB      cefTRIAXone   IVPB 1000 milliGRAM(s) IV Intermittent every 24 hours  celecoxib 200 milliGRAM(s) Oral every 12 hours  enoxaparin Injectable 40 milliGRAM(s) SubCutaneous every 24 hours  fluticasone propionate 50 MICROgram(s)/spray Nasal Spray 1 Spray(s) Both Nostrils two times a day  metoprolol succinate ER 25 milliGRAM(s) Oral daily  mometasone 220 MICROgram(s) Inhaler 1 Puff(s) Inhalation daily  montelukast 10 milliGRAM(s) Oral daily  pantoprazole    Tablet 40 milliGRAM(s) Oral two times a day  polyethylene glycol 3350 17 Gram(s) Oral at bedtime  senna 2 Tablet(s) Oral at bedtime  sodium chloride 0.9%. 1000 milliLiter(s) (50 mL/Hr) IV Continuous <Continuous>  tiotropium 18 MICROgram(s) Capsule 1 Capsule(s) Inhalation daily  torsemide 5 milliGRAM(s) Oral daily    MEDICATIONS  (PRN):  ALBUTerol    90 MICROgram(s) HFA Inhaler 2 Puff(s) Inhalation every 6 hours PRN Shortness of Breath and/or Wheezing  aluminum hydroxide/magnesium hydroxide/simethicone Suspension 30 milliLiter(s) Oral every 4 hours PRN Dyspepsia  bisacodyl Suppository 10 milliGRAM(s) Rectal once PRN Constipation  hydrocodone/homatropine Syrup 5 milliLiter(s) Oral every 6 hours PRN for cough  magnesium hydroxide Suspension 30 milliLiter(s) Oral daily PRN Constipation  melatonin 3 milliGRAM(s) Oral at bedtime PRN Insomnia  Pepcid Complete 1 Tablet(s) 1 Tablet(s) Chew daily PRN Heartburn  traMADol 50 milliGRAM(s) Oral every 4 hours PRN Moderate Pain (4 - 6)  traMADol 100 milliGRAM(s) Oral every 6 hours PRN Severe Pain (7 - 10)         Vitals log        ICU Vital Signs Last 24 Hrs  T(C): 36.7 (11 May 2022 23:30), Max: 37.1 (11 May 2022 15:17)  T(F): 98 (11 May 2022 23:30), Max: 98.7 (11 May 2022 15:17)  HR: 101 (11 May 2022 23:30) (86 - 101)  BP: 124/76 (11 May 2022 23:30) (64/41 - 124/76)  BP(mean): --  ABP: --  ABP(mean): --  RR: 18 (11 May 2022 23:30) (18 - 20)  SpO2: 94% (11 May 2022 23:30) (92% - 95%)           Input and Output:  I&O's Detail    10 May 2022 07:01  -  11 May 2022 07:00  --------------------------------------------------------  IN:  Total IN: 0 mL    OUT:    Voided (mL): 200 mL  Total OUT: 200 mL    Total NET: -200 mL          Lab Data                        12.9   14.31 )-----------( 590      ( 11 May 2022 06:30 )             39.6     05-11    134<L>  |  100  |  21  ----------------------------<  94  4.2   |  26  |  0.88    Ca    9.2      11 May 2022 06:30              Review of Systems	      Objective     Physical Examination    heart s1s2  lung dec BS  abd soft  head nc      Pertinent Lab findings & Imaging      Cassandra:  NO   Adequate UO     I&O's Detail    10 May 2022 07:01  -  11 May 2022 07:00  --------------------------------------------------------  IN:  Total IN: 0 mL    OUT:    Voided (mL): 200 mL  Total OUT: 200 mL    Total NET: -200 mL               Discussed with:     Cultures:	        Radiology

## 2022-05-12 NOTE — PROGRESS NOTE ADULT - SUBJECTIVE AND OBJECTIVE BOX
Patient is a 86y old  Female who presents with a chief complaint of fall -> right femoral fracture (12 May 2022 11:38)      INTERVAL HPI/OVERNIGHT EVENTS: Patient seen and examined at bedside. No overnight events. BP dropped to 90's today with ambulation and patient c/o near syncope     MEDICATIONS  (STANDING):  acetaminophen     Tablet .. 1000 milliGRAM(s) Oral every 8 hours  aspirin enteric coated 81 milliGRAM(s) Oral daily  atorvastatin 10 milliGRAM(s) Oral at bedtime  cefTRIAXone   IVPB      cefTRIAXone   IVPB 1000 milliGRAM(s) IV Intermittent every 24 hours  celecoxib 200 milliGRAM(s) Oral every 12 hours  enoxaparin Injectable 40 milliGRAM(s) SubCutaneous every 24 hours  fluticasone propionate 50 MICROgram(s)/spray Nasal Spray 1 Spray(s) Both Nostrils two times a day  metoprolol succinate ER 25 milliGRAM(s) Oral daily  mometasone 220 MICROgram(s) Inhaler 1 Puff(s) Inhalation daily  montelukast 10 milliGRAM(s) Oral daily  pantoprazole    Tablet 40 milliGRAM(s) Oral two times a day  polyethylene glycol 3350 17 Gram(s) Oral at bedtime  senna 2 Tablet(s) Oral at bedtime  sodium chloride 0.9% Bolus 500 milliLiter(s) IV Bolus once  sodium chloride 0.9%. 1000 milliLiter(s) (50 mL/Hr) IV Continuous <Continuous>  tiotropium 18 MICROgram(s) Capsule 1 Capsule(s) Inhalation daily    MEDICATIONS  (PRN):  ALBUTerol    90 MICROgram(s) HFA Inhaler 2 Puff(s) Inhalation every 6 hours PRN Shortness of Breath and/or Wheezing  aluminum hydroxide/magnesium hydroxide/simethicone Suspension 30 milliLiter(s) Oral every 4 hours PRN Dyspepsia  bisacodyl Suppository 10 milliGRAM(s) Rectal once PRN Constipation  hydrocodone/homatropine Syrup 5 milliLiter(s) Oral every 6 hours PRN for cough  magnesium hydroxide Suspension 30 milliLiter(s) Oral daily PRN Constipation  melatonin 3 milliGRAM(s) Oral at bedtime PRN Insomnia  Pepcid Complete 1 Tablet(s) 1 Tablet(s) Chew daily PRN Heartburn  traMADol 50 milliGRAM(s) Oral every 4 hours PRN Moderate Pain (4 - 6)  traMADol 100 milliGRAM(s) Oral every 6 hours PRN Severe Pain (7 - 10)      Allergies    adhesives (Hives)  Advair Diskus (Rash)  Flovent (Rash)  Percocet 10/325 (Pruritus; Hives)  Pulmicort Flexhaler (Rash)    Intolerances        REVIEW OF SYSTEMS:  CONSTITUTIONAL: No fever or chills  HEENT:  No headache, no sore throat  RESPIRATORY: No cough, wheezing, or shortness of breath  CARDIOVASCULAR: No chest pain, palpitations, or leg swelling  GASTROINTESTINAL: No abd pain, nausea, vomiting, or diarrhea  GENITOURINARY: No dysuria, frequency, or hematuria  NEUROLOGICAL: no focal weakness or dizziness  MUSCULOSKELETAL: no myalgias     Vital Signs Last 24 Hrs  T(C): 36.8 (12 May 2022 07:53), Max: 37.1 (11 May 2022 15:17)  T(F): 98.3 (12 May 2022 07:53), Max: 98.7 (11 May 2022 15:17)  HR: 78 (12 May 2022 07:53) (78 - 101)  BP: 124/78 (12 May 2022 11:53) (110/69 - 144/79)  BP(mean): --  RR: 18 (12 May 2022 07:53) (18 - 20)  SpO2: 96% (12 May 2022 10:56) (93% - 96%)    PHYSICAL EXAM:  GENERAL: NAD  HEENT:  NCAT, moist mucous membranes  CHEST/LUNG:  CTA b/l, no rales, wheezes, or rhonchi  HEART:  RRR, S1, S2  ABDOMEN:  BS+, soft, nontender, nondistended  EXTREMITIES: no edema, cyanosis, or calf tenderness  NERVOUS SYSTEM: AA&Ox3, sensation intact    LABS:                        11.7   10.31 )-----------( 595      ( 12 May 2022 08:20 )             36.3     CBC Full  -  ( 12 May 2022 08:20 )  WBC Count : 10.31 K/uL  Hemoglobin : 11.7 g/dL  Hematocrit : 36.3 %  Platelet Count - Automated : 595 K/uL  Mean Cell Volume : 90.3 fl  Mean Cell Hemoglobin : 29.1 pg  Mean Cell Hemoglobin Concentration : 32.2 gm/dL  Auto Neutrophil # : x  Auto Lymphocyte # : x  Auto Monocyte # : x  Auto Eosinophil # : x  Auto Basophil # : x  Auto Neutrophil % : x  Auto Lymphocyte % : x  Auto Monocyte % : x  Auto Eosinophil % : x  Auto Basophil % : x    12 May 2022 08:20    139    |  104    |  18     ----------------------------<  96     4.3     |  30     |  0.66     Ca    9.2        12 May 2022 08:20          CAPILLARY BLOOD GLUCOSE              RADIOLOGY & ADDITIONAL TESTS:    Consultant(s) Notes Reviewed:  [x] YES  [ ] NO    Care Discussed with [x] Consultants  [x] Patient  [ ] Family  [ ]      [ x] Other; RN  DVT ppx

## 2022-05-12 NOTE — DIETITIAN INITIAL EVALUATION ADULT - PERTINENT LABORATORY DATA
05-12    139  |  104  |  18  ----------------------------<  96  4.3   |  30  |  0.66    Ca    9.2      12 May 2022 08:20

## 2022-05-12 NOTE — DIETITIAN INITIAL EVALUATION ADULT - PERTINENT MEDS FT
MEDICATIONS  (STANDING):  acetaminophen     Tablet .. 1000 milliGRAM(s) Oral every 8 hours  aspirin enteric coated 81 milliGRAM(s) Oral daily  atorvastatin 10 milliGRAM(s) Oral at bedtime  cefTRIAXone   IVPB      cefTRIAXone   IVPB 1000 milliGRAM(s) IV Intermittent every 24 hours  celecoxib 200 milliGRAM(s) Oral every 12 hours  enoxaparin Injectable 40 milliGRAM(s) SubCutaneous every 24 hours  fluticasone propionate 50 MICROgram(s)/spray Nasal Spray 1 Spray(s) Both Nostrils two times a day  metoprolol succinate ER 25 milliGRAM(s) Oral daily  mometasone 220 MICROgram(s) Inhaler 1 Puff(s) Inhalation daily  montelukast 10 milliGRAM(s) Oral daily  pantoprazole    Tablet 40 milliGRAM(s) Oral two times a day  polyethylene glycol 3350 17 Gram(s) Oral at bedtime  senna 2 Tablet(s) Oral at bedtime  sodium chloride 0.9%. 1000 milliLiter(s) (50 mL/Hr) IV Continuous <Continuous>  tiotropium 18 MICROgram(s) Capsule 1 Capsule(s) Inhalation daily    MEDICATIONS  (PRN):  ALBUTerol    90 MICROgram(s) HFA Inhaler 2 Puff(s) Inhalation every 6 hours PRN Shortness of Breath and/or Wheezing  aluminum hydroxide/magnesium hydroxide/simethicone Suspension 30 milliLiter(s) Oral every 4 hours PRN Dyspepsia  bisacodyl Suppository 10 milliGRAM(s) Rectal once PRN Constipation  hydrocodone/homatropine Syrup 5 milliLiter(s) Oral every 6 hours PRN for cough  magnesium hydroxide Suspension 30 milliLiter(s) Oral daily PRN Constipation  melatonin 3 milliGRAM(s) Oral at bedtime PRN Insomnia  Pepcid Complete 1 Tablet(s) 1 Tablet(s) Chew daily PRN Heartburn  traMADol 50 milliGRAM(s) Oral every 4 hours PRN Moderate Pain (4 - 6)  traMADol 100 milliGRAM(s) Oral every 6 hours PRN Severe Pain (7 - 10)

## 2022-05-12 NOTE — PROGRESS NOTE ADULT - SUBJECTIVE AND OBJECTIVE BOX
[facility], Division of Infectious Diseases  GERRY Ivan Y. Patel, S. Shah, G. [facility]  [phone]    Name: STEFANO COBOS  Age: 86y  Gender: Female  MRN: [id number]    Interval History:  Hypotension this AM  Pt sitting up in chair   Denies pain  Feeling lightheaded   Notes reviewed    Antibiotics:  cefTRIAXone   IVPB      cefTRIAXone   IVPB 1000 milliGRAM(s) IV Intermittent every 24 hours      Medications:  acetaminophen     Tablet .. 1000 milliGRAM(s) Oral every 8 hours  ALBUTerol    90 MICROgram(s) HFA Inhaler 2 Puff(s) Inhalation every 6 hours PRN  aluminum hydroxide/magnesium hydroxide/simethicone Suspension 30 milliLiter(s) Oral every 4 hours PRN  aspirin enteric coated 81 milliGRAM(s) Oral daily  atorvastatin 10 milliGRAM(s) Oral at bedtime  bisacodyl Suppository 10 milliGRAM(s) Rectal once PRN  cefTRIAXone   IVPB      cefTRIAXone   IVPB 1000 milliGRAM(s) IV Intermittent every 24 hours  celecoxib 200 milliGRAM(s) Oral every 12 hours  enoxaparin Injectable 40 milliGRAM(s) SubCutaneous every 24 hours  fluticasone propionate 50 MICROgram(s)/spray Nasal Spray 1 Spray(s) Both Nostrils two times a day  hydrocodone/homatropine Syrup 5 milliLiter(s) Oral every 6 hours PRN  magnesium hydroxide Suspension 30 milliLiter(s) Oral daily PRN  melatonin 3 milliGRAM(s) Oral at bedtime PRN  metoprolol succinate ER 25 milliGRAM(s) Oral daily  mometasone 220 MICROgram(s) Inhaler 1 Puff(s) Inhalation daily  montelukast 10 milliGRAM(s) Oral daily  pantoprazole    Tablet 40 milliGRAM(s) Oral two times a day  Pepcid Complete 1 Tablet(s) 1 Tablet(s) Chew daily PRN  polyethylene glycol 3350 17 Gram(s) Oral at bedtime  senna 2 Tablet(s) Oral at bedtime  sodium chloride 0.9%. 1000 milliLiter(s) IV Continuous <Continuous>  tiotropium 18 MICROgram(s) Capsule 1 Capsule(s) Inhalation daily  torsemide 5 milliGRAM(s) Oral daily  traMADol 50 milliGRAM(s) Oral every 4 hours PRN  traMADol 100 milliGRAM(s) Oral every 6 hours PRN      Review of Systems:  Review of systems otherwise negative except as previously noted.    Allergies: adhesives (Hives)  Advair Diskus (Rash)  Flovent (Rash)  Percocet 10/325 (Pruritus; Hives)  Pulmicort Flexhaler (Rash)    For details regarding the patient's past medical history, social history, family history, and other miscellaneous elements, please refer the initial infectious diseases consultation and/or the admitting history and physical examination for this admission.    Objective:  Vitals:   T(C): 36.8 (05-12-22 @ 07:53), Max: 37.1 (05-11-22 @ 15:17)  HR: 78 (05-12-22 @ 07:53) (78 - 101)  BP: 124/78 (05-12-22 @ 10:56) (110/69 - 144/79)  RR: 18 (05-12-22 @ 07:53) (18 - 20)  SpO2: 96% (05-12-22 @ 10:56) (93% - 96%)    Physical Examination:  General: no acute distress  HEENT: NC/AT, EOMI  Cardio: S1, S2 heard, RRR, no murmurs  Resp: breath sounds heard bilaterally, no rales, wheezes or rhonchi  Abd: soft, NT, N  Ext: no edema or cyanosis  Skin: warm, dry, no visible rash      Laboratory Studies:  CBC:                       11.7   10.31 )-----------( 595      ( 12 May 2022 08:20 )             36.3     CMP: 05-12    139  |  104  |  18  ----------------------------<  96  4.3   |  30  |  0.66    Ca    9.2      12 May 2022 08:20            Microbiology: reviewed      Radiology: reviewed

## 2022-05-13 VITALS
DIASTOLIC BLOOD PRESSURE: 82 MMHG | TEMPERATURE: 98 F | RESPIRATION RATE: 16 BRPM | HEART RATE: 98 BPM | SYSTOLIC BLOOD PRESSURE: 136 MMHG | OXYGEN SATURATION: 96 %

## 2022-05-13 LAB
ANION GAP SERPL CALC-SCNC: 7 MMOL/L — SIGNIFICANT CHANGE UP (ref 5–17)
BUN SERPL-MCNC: 16 MG/DL — SIGNIFICANT CHANGE UP (ref 7–23)
CALCIUM SERPL-MCNC: 9.2 MG/DL — SIGNIFICANT CHANGE UP (ref 8.4–10.5)
CHLORIDE SERPL-SCNC: 103 MMOL/L — SIGNIFICANT CHANGE UP (ref 96–108)
CO2 SERPL-SCNC: 30 MMOL/L — SIGNIFICANT CHANGE UP (ref 22–31)
CREAT SERPL-MCNC: 0.66 MG/DL — SIGNIFICANT CHANGE UP (ref 0.5–1.3)
EGFR: 85 ML/MIN/1.73M2 — SIGNIFICANT CHANGE UP
GLUCOSE SERPL-MCNC: 105 MG/DL — HIGH (ref 70–99)
HCT VFR BLD CALC: 35.8 % — SIGNIFICANT CHANGE UP (ref 34.5–45)
HGB BLD-MCNC: 11.5 G/DL — SIGNIFICANT CHANGE UP (ref 11.5–15.5)
MCHC RBC-ENTMCNC: 29.2 PG — SIGNIFICANT CHANGE UP (ref 27–34)
MCHC RBC-ENTMCNC: 32.1 GM/DL — SIGNIFICANT CHANGE UP (ref 32–36)
MCV RBC AUTO: 90.9 FL — SIGNIFICANT CHANGE UP (ref 80–100)
NRBC # BLD: 0 /100 WBCS — SIGNIFICANT CHANGE UP (ref 0–0)
PLATELET # BLD AUTO: 596 K/UL — HIGH (ref 150–400)
POTASSIUM SERPL-MCNC: 4.1 MMOL/L — SIGNIFICANT CHANGE UP (ref 3.5–5.3)
POTASSIUM SERPL-SCNC: 4.1 MMOL/L — SIGNIFICANT CHANGE UP (ref 3.5–5.3)
RBC # BLD: 3.94 M/UL — SIGNIFICANT CHANGE UP (ref 3.8–5.2)
RBC # FLD: 15.3 % — HIGH (ref 10.3–14.5)
SARS-COV-2 RNA SPEC QL NAA+PROBE: SIGNIFICANT CHANGE UP
SODIUM SERPL-SCNC: 140 MMOL/L — SIGNIFICANT CHANGE UP (ref 135–145)
WBC # BLD: 10.42 K/UL — SIGNIFICANT CHANGE UP (ref 3.8–10.5)
WBC # FLD AUTO: 10.42 K/UL — SIGNIFICANT CHANGE UP (ref 3.8–10.5)

## 2022-05-13 PROCEDURE — 36415 COLL VENOUS BLD VENIPUNCTURE: CPT

## 2022-05-13 PROCEDURE — 99239 HOSP IP/OBS DSCHRG MGMT >30: CPT

## 2022-05-13 PROCEDURE — 83735 ASSAY OF MAGNESIUM: CPT

## 2022-05-13 PROCEDURE — C1769: CPT

## 2022-05-13 PROCEDURE — 93005 ELECTROCARDIOGRAM TRACING: CPT

## 2022-05-13 PROCEDURE — 85027 COMPLETE CBC AUTOMATED: CPT

## 2022-05-13 PROCEDURE — 87635 SARS-COV-2 COVID-19 AMP PRB: CPT

## 2022-05-13 PROCEDURE — 97530 THERAPEUTIC ACTIVITIES: CPT

## 2022-05-13 PROCEDURE — 73502 X-RAY EXAM HIP UNI 2-3 VIEWS: CPT

## 2022-05-13 PROCEDURE — 86900 BLOOD TYPING SEROLOGIC ABO: CPT

## 2022-05-13 PROCEDURE — 97161 PT EVAL LOW COMPLEX 20 MIN: CPT

## 2022-05-13 PROCEDURE — 80048 BASIC METABOLIC PNL TOTAL CA: CPT

## 2022-05-13 PROCEDURE — 96374 THER/PROPH/DIAG INJ IV PUSH: CPT

## 2022-05-13 PROCEDURE — 99285 EMERGENCY DEPT VISIT HI MDM: CPT | Mod: 25

## 2022-05-13 PROCEDURE — 85610 PROTHROMBIN TIME: CPT

## 2022-05-13 PROCEDURE — 96376 TX/PRO/DX INJ SAME DRUG ADON: CPT

## 2022-05-13 PROCEDURE — C1889: CPT

## 2022-05-13 PROCEDURE — 76000 FLUOROSCOPY <1 HR PHYS/QHP: CPT

## 2022-05-13 PROCEDURE — 85730 THROMBOPLASTIN TIME PARTIAL: CPT

## 2022-05-13 PROCEDURE — 85025 COMPLETE CBC W/AUTO DIFF WBC: CPT

## 2022-05-13 PROCEDURE — C1713: CPT

## 2022-05-13 PROCEDURE — 93306 TTE W/DOPPLER COMPLETE: CPT

## 2022-05-13 PROCEDURE — 97110 THERAPEUTIC EXERCISES: CPT

## 2022-05-13 PROCEDURE — 71045 X-RAY EXAM CHEST 1 VIEW: CPT

## 2022-05-13 PROCEDURE — 81001 URINALYSIS AUTO W/SCOPE: CPT

## 2022-05-13 PROCEDURE — 86901 BLOOD TYPING SEROLOGIC RH(D): CPT

## 2022-05-13 PROCEDURE — 83880 ASSAY OF NATRIURETIC PEPTIDE: CPT

## 2022-05-13 PROCEDURE — 86850 RBC ANTIBODY SCREEN: CPT

## 2022-05-13 PROCEDURE — 97165 OT EVAL LOW COMPLEX 30 MIN: CPT

## 2022-05-13 PROCEDURE — 80053 COMPREHEN METABOLIC PANEL: CPT

## 2022-05-13 PROCEDURE — 94640 AIRWAY INHALATION TREATMENT: CPT

## 2022-05-13 PROCEDURE — 97116 GAIT TRAINING THERAPY: CPT

## 2022-05-13 PROCEDURE — 97535 SELF CARE MNGMENT TRAINING: CPT

## 2022-05-13 RX ORDER — CEFPODOXIME PROXETIL 100 MG
1 TABLET ORAL
Qty: 0 | Refills: 0 | DISCHARGE

## 2022-05-13 RX ORDER — METOPROLOL TARTRATE 50 MG
1 TABLET ORAL
Qty: 14 | Refills: 0
Start: 2022-05-13 | End: 2022-05-26

## 2022-05-13 RX ORDER — NIFEDIPINE 30 MG
1 TABLET, EXTENDED RELEASE 24 HR ORAL
Qty: 0 | Refills: 0 | DISCHARGE

## 2022-05-13 RX ORDER — LOSARTAN POTASSIUM 100 MG/1
1 TABLET, FILM COATED ORAL
Qty: 0 | Refills: 0 | DISCHARGE

## 2022-05-13 RX ORDER — CEFTRIAXONE 500 MG/1
1000 INJECTION, POWDER, FOR SOLUTION INTRAMUSCULAR; INTRAVENOUS EVERY 24 HOURS
Refills: 0 | Status: COMPLETED | OUTPATIENT
Start: 2022-05-13 | End: 2022-05-13

## 2022-05-13 RX ADMIN — CEFTRIAXONE 100 MILLIGRAM(S): 500 INJECTION, POWDER, FOR SOLUTION INTRAMUSCULAR; INTRAVENOUS at 15:54

## 2022-05-13 RX ADMIN — CELECOXIB 200 MILLIGRAM(S): 200 CAPSULE ORAL at 09:45

## 2022-05-13 RX ADMIN — CELECOXIB 200 MILLIGRAM(S): 200 CAPSULE ORAL at 09:44

## 2022-05-13 RX ADMIN — PANTOPRAZOLE SODIUM 40 MILLIGRAM(S): 20 TABLET, DELAYED RELEASE ORAL at 17:41

## 2022-05-13 RX ADMIN — TIOTROPIUM BROMIDE 1 CAPSULE(S): 18 CAPSULE ORAL; RESPIRATORY (INHALATION) at 05:40

## 2022-05-13 RX ADMIN — MONTELUKAST 10 MILLIGRAM(S): 4 TABLET, CHEWABLE ORAL at 09:45

## 2022-05-13 RX ADMIN — Medication 1000 MILLIGRAM(S): at 05:39

## 2022-05-13 RX ADMIN — Medication 81 MILLIGRAM(S): at 05:39

## 2022-05-13 RX ADMIN — Medication 1 SPRAY(S): at 05:51

## 2022-05-13 RX ADMIN — BECLOMETHASONE DIPROPIONATE 3 PUFF(S): 40 AEROSOL, METERED RESPIRATORY (INHALATION) at 09:48

## 2022-05-13 RX ADMIN — Medication 1 SPRAY(S): at 17:41

## 2022-05-13 RX ADMIN — Medication 1000 MILLIGRAM(S): at 13:10

## 2022-05-13 RX ADMIN — Medication 25 MILLIGRAM(S): at 06:01

## 2022-05-13 RX ADMIN — Medication 1000 MILLIGRAM(S): at 13:07

## 2022-05-13 RX ADMIN — ENOXAPARIN SODIUM 40 MILLIGRAM(S): 100 INJECTION SUBCUTANEOUS at 09:44

## 2022-05-13 RX ADMIN — PANTOPRAZOLE SODIUM 40 MILLIGRAM(S): 20 TABLET, DELAYED RELEASE ORAL at 05:39

## 2022-05-13 NOTE — PROGRESS NOTE ADULT - SUBJECTIVE AND OBJECTIVE BOX
Date/Time Patient Seen:  		  Referring MD:   Data Reviewed	       Patient is a 86y old  Female who presents with a chief complaint of Fracture of unspecified part of neck of right femur, initial encounter for closed fracture     (12 May 2022 16:00)      Subjective/HPI     PAST MEDICAL & SURGICAL HISTORY:  Hypertension    Asthma    Breast cancer  H/o 10/1998    Vasovagal syndrome    Lymph edema  right - NO IV NO BLOOD DRAW, NO B/P RIGHT ARM    Barretts esophagus    COPD (chronic obstructive pulmonary disease)    Abnormal lung field    Cataract  Bilateral    Thyroid nodule    Lung cancer    S/P mastectomy, bilateral  in 1998    Deviated nasal septum  had surgery in 1994    S/P cataract surgery  bilateral in 2009    S/P wrist surgery  right in 2012    Cataract  2009 B/l    H/O arthroplasty  right knee replacement 2013    H/O arthroscopy of left knee  2013    S/P D&amp;C (status post dilation and curettage)  Endometrial bx 2012    History of lung surgery  Left lng wedge resection   7/6/15          Medication list         MEDICATIONS  (STANDING):  acetaminophen     Tablet .. 1000 milliGRAM(s) Oral every 8 hours  aspirin enteric coated 81 milliGRAM(s) Oral daily  atorvastatin 10 milliGRAM(s) Oral at bedtime  beclomethasone  80 MICROgram(s) Inhaler 3 Puff(s) Inhalation every 12 hours  cefTRIAXone   IVPB      cefTRIAXone   IVPB 1000 milliGRAM(s) IV Intermittent every 24 hours  celecoxib 200 milliGRAM(s) Oral every 12 hours  enoxaparin Injectable 40 milliGRAM(s) SubCutaneous every 24 hours  fluticasone propionate 50 MICROgram(s)/spray Nasal Spray 1 Spray(s) Both Nostrils two times a day  metoprolol succinate ER 25 milliGRAM(s) Oral daily  montelukast 10 milliGRAM(s) Oral daily  pantoprazole    Tablet 40 milliGRAM(s) Oral two times a day  Pepcid Complete 1 Tablet(s) 1 Tablet(s) Chew two times a day  polyethylene glycol 3350 17 Gram(s) Oral at bedtime  senna 2 Tablet(s) Oral at bedtime  sodium chloride 0.9%. 1000 milliLiter(s) (50 mL/Hr) IV Continuous <Continuous>  tiotropium 18 MICROgram(s) Capsule 1 Capsule(s) Inhalation daily    MEDICATIONS  (PRN):  ALBUTerol    90 MICROgram(s) HFA Inhaler 2 Puff(s) Inhalation every 6 hours PRN Shortness of Breath and/or Wheezing  aluminum hydroxide/magnesium hydroxide/simethicone Suspension 30 milliLiter(s) Oral every 4 hours PRN Dyspepsia  bisacodyl Suppository 10 milliGRAM(s) Rectal once PRN Constipation  hydrocodone/homatropine Syrup 5 milliLiter(s) Oral every 6 hours PRN for cough  magnesium hydroxide Suspension 30 milliLiter(s) Oral daily PRN Constipation  melatonin 3 milliGRAM(s) Oral at bedtime PRN Insomnia  traMADol 50 milliGRAM(s) Oral every 4 hours PRN Moderate Pain (4 - 6)  traMADol 100 milliGRAM(s) Oral every 6 hours PRN Severe Pain (7 - 10)         Vitals log        ICU Vital Signs Last 24 Hrs  T(C): 36.5 (12 May 2022 23:30), Max: 36.8 (12 May 2022 07:53)  T(F): 97.7 (12 May 2022 23:30), Max: 98.3 (12 May 2022 07:53)  HR: 88 (13 May 2022 05:34) (78 - 89)  BP: 148/83 (13 May 2022 05:34) (124/78 - 148/83)  BP(mean): --  ABP: --  ABP(mean): --  RR: 18 (12 May 2022 23:30) (18 - 18)  SpO2: 97% (12 May 2022 23:30) (95% - 97%)           Input and Output:  I&O's Detail      Lab Data                        11.7   10.31 )-----------( 595      ( 12 May 2022 08:20 )             36.3     05-12    139  |  104  |  18  ----------------------------<  96  4.3   |  30  |  0.66    Ca    9.2      12 May 2022 08:20              Review of Systems	      Objective     Physical Examination    heart s1s2  lung dc BS  abd soft      Pertinent Lab findings & Imaging      Cassandra:  NO   Adequate UO     I&O's Detail           Discussed with:     Cultures:	        Radiology

## 2022-05-13 NOTE — PROGRESS NOTE ADULT - SUBJECTIVE AND OBJECTIVE BOX
Patient is a 86y old  Female who presents with a chief complaint of fall -> right femoral fracture (13 May 2022 09:36)      INTERVAL HPI/OVERNIGHT EVENTS: Patient seen and examined at bedside. No overnight events    MEDICATIONS  (STANDING):  acetaminophen     Tablet .. 1000 milliGRAM(s) Oral every 8 hours  aspirin enteric coated 81 milliGRAM(s) Oral daily  atorvastatin 10 milliGRAM(s) Oral at bedtime  beclomethasone  80 MICROgram(s) Inhaler 3 Puff(s) Inhalation every 12 hours  cefTRIAXone   IVPB      cefTRIAXone   IVPB 1000 milliGRAM(s) IV Intermittent every 24 hours  celecoxib 200 milliGRAM(s) Oral every 12 hours  enoxaparin Injectable 40 milliGRAM(s) SubCutaneous every 24 hours  fluticasone propionate 50 MICROgram(s)/spray Nasal Spray 1 Spray(s) Both Nostrils two times a day  metoprolol succinate ER 25 milliGRAM(s) Oral daily  montelukast 10 milliGRAM(s) Oral daily  pantoprazole    Tablet 40 milliGRAM(s) Oral two times a day  Pepcid Complete 1 Tablet(s) 1 Tablet(s) Chew two times a day  polyethylene glycol 3350 17 Gram(s) Oral at bedtime  senna 2 Tablet(s) Oral at bedtime  sodium chloride 0.9%. 1000 milliLiter(s) (50 mL/Hr) IV Continuous <Continuous>  tiotropium 18 MICROgram(s) Capsule 1 Capsule(s) Inhalation daily    MEDICATIONS  (PRN):  ALBUTerol    90 MICROgram(s) HFA Inhaler 2 Puff(s) Inhalation every 6 hours PRN Shortness of Breath and/or Wheezing  aluminum hydroxide/magnesium hydroxide/simethicone Suspension 30 milliLiter(s) Oral every 4 hours PRN Dyspepsia  bisacodyl Suppository 10 milliGRAM(s) Rectal once PRN Constipation  hydrocodone/homatropine Syrup 5 milliLiter(s) Oral every 6 hours PRN for cough  magnesium hydroxide Suspension 30 milliLiter(s) Oral daily PRN Constipation  melatonin 3 milliGRAM(s) Oral at bedtime PRN Insomnia  traMADol 50 milliGRAM(s) Oral every 4 hours PRN Moderate Pain (4 - 6)  traMADol 100 milliGRAM(s) Oral every 6 hours PRN Severe Pain (7 - 10)      Allergies    adhesives (Hives)  Advair Diskus (Rash)  Flovent (Rash)  Percocet 10/325 (Pruritus; Hives)  Pulmicort Flexhaler (Rash)    Intolerances        REVIEW OF SYSTEMS:  CONSTITUTIONAL: No fever or chills  HEENT:  No headache, no sore throat  RESPIRATORY: No cough, wheezing, or shortness of breath  CARDIOVASCULAR: No chest pain, palpitations, or leg swelling  GASTROINTESTINAL: No abd pain, nausea, vomiting, or diarrhea  GENITOURINARY: No dysuria, frequency, or hematuria  NEUROLOGICAL: no focal weakness or dizziness  MUSCULOSKELETAL: no myalgias     Vital Signs Last 24 Hrs  T(C): 36.5 (13 May 2022 07:55), Max: 36.8 (12 May 2022 15:15)  T(F): 97.7 (13 May 2022 07:55), Max: 98.2 (12 May 2022 15:15)  HR: 82 (13 May 2022 07:55) (82 - 89)  BP: 167/84 (13 May 2022 07:55) (136/71 - 167/84)  BP(mean): --  RR: 18 (13 May 2022 07:55) (18 - 18)  SpO2: 97% (13 May 2022 07:55) (96% - 97%)    PHYSICAL EXAM:  GENERAL: NAD, elderly   HEENT:  NCAT, moist mucous membranes  CHEST/LUNG:  CTA b/l, no rales, wheezes, or rhonchi  HEART:  RRR, S1, S2  ABDOMEN:  BS+, soft, nontender, nondistended  EXTREMITIES: no edema, cyanosis, or calf tenderness  NERVOUS SYSTEM: AA&Ox3, sensation intact    LABS:                        11.5   10.42 )-----------( 596      ( 13 May 2022 06:30 )             35.8     CBC Full  -  ( 13 May 2022 06:30 )  WBC Count : 10.42 K/uL  Hemoglobin : 11.5 g/dL  Hematocrit : 35.8 %  Platelet Count - Automated : 596 K/uL  Mean Cell Volume : 90.9 fl  Mean Cell Hemoglobin : 29.2 pg  Mean Cell Hemoglobin Concentration : 32.1 gm/dL  Auto Neutrophil # : x  Auto Lymphocyte # : x  Auto Monocyte # : x  Auto Eosinophil # : x  Auto Basophil # : x  Auto Neutrophil % : x  Auto Lymphocyte % : x  Auto Monocyte % : x  Auto Eosinophil % : x  Auto Basophil % : x    13 May 2022 06:30    140    |  103    |  16     ----------------------------<  105    4.1     |  30     |  0.66     Ca    9.2        13 May 2022 06:30          CAPILLARY BLOOD GLUCOSE              RADIOLOGY & ADDITIONAL TESTS:    Consultant(s) Notes Reviewed:  [x] YES  [ ] NO    Care Discussed with [x] Consultants  [x] Patient  [ ] Family  [ ]      [ x] Other; RN  DVT ppx

## 2022-05-13 NOTE — PROGRESS NOTE ADULT - ASSESSMENT
The patient is an 86 year old female with a history of HTN, HL, chronic leg swelling, lung cancer s/p resection, COPD, vasovagal syncope who is admitted with a hip fracture.    Plan:  - ECG with no evidence of ischemia or infarction  - Echo with normal LV systolic function, no significant valve issues  - Losartan, nifedipine, metoprolol, and torsemide on hold  - Continue atorvastatin 10 mg daily  - Continue aspirin 81 mg daily  - BP elevated this morning. Repeat orthostatics today. If negative, can resume nifedipine only. If positive, remain off of antihypertensives.  - Discharge planning when asymptomatic from an orthostatic standpoint

## 2022-05-13 NOTE — PROGRESS NOTE ADULT - ASSESSMENT
86F with hx of lung CA (s/p CELESTE resection) and breast CA (s/p bilateral mastectomies and tamoxifen), Escobar's esophagus, HTN, asthma and COPD, PAD who presents with right hip pain after a fall.   Found to have a right intertrochanteric fracture.      lung ca  breast ca  GERD  FALL  Hip Fx  Asthma  COPD  PAD    eval for Dizziness -   TTE grossly normal -   right Hip ORIF - POD 4  VS noted -   ID eval noted - on Rocephin     pt with asthma copd emphysema - hx of Lung Ca and lobectomy in the past -   pt follows with Dr ANDRÉS Morales in the office for Pulm Medicine -   cont Spiriva - Singulair - Asmanex - Proventil PRN  Imaging and Labs reviewed  old records reviewed  prior operation on Lung with Dr Aguilar at [facility]  I angel  pain assessment  assist with needs  outpatient records reviewed

## 2022-05-13 NOTE — PROGRESS NOTE ADULT - REASON FOR ADMISSION
fall -> right femoral fracture
fall -> right femoral fracture--for ORIF w/ gamma nail
fall -> right femoral fracture
fall -> right femoral fracture--for ORIF right femoral neck fx
fall -> right femoral fracture

## 2022-05-13 NOTE — PROGRESS NOTE ADULT - SUBJECTIVE AND OBJECTIVE BOX
Chief Complaint: Fall    Interval Events: No events overnight.    Review of Systems:  General: No fevers, chills, weight gain  Skin: No rashes, color changes  Cardiovascular: No chest pain, orthopnea  Respiratory: No shortness of breath, cough  Gastrointestinal: No nausea, abdominal pain  Genitourinary: No incontinence, pain with urination  Musculoskeletal: +pain, swelling, decreased range of motion  Neurological: No headache, weakness  Psychiatric: No depression, anxiety  Endocrine: No weight gain, increased thirst  All other systems are comprehensively negative.    Physical Exam:  Vital Signs Last 24 Hrs  T(C): 36.5 (13 May 2022 07:55), Max: 36.8 (12 May 2022 15:15)  T(F): 97.7 (13 May 2022 07:55), Max: 98.2 (12 May 2022 15:15)  HR: 82 (13 May 2022 07:55) (82 - 89)  BP: 167/84 (13 May 2022 07:55) (124/78 - 167/84)  BP(mean): --  RR: 18 (13 May 2022 07:55) (18 - 18)  SpO2: 97% (13 May 2022 07:55) (96% - 97%)  General: NAD  HEENT: MMM  Neck: No JVD, no carotid bruit  Lungs: CTAB  CV: RRR, nl S1/S2, no M/R/G  Abdomen: S/NT/ND, +BS  Extremities: No LE edema, no cyanosis  Neuro: AAOx3, non-focal  Skin: No rash    Labs:    05-13    140  |  103  |  16  ----------------------------<  105<H>  4.1   |  30  |  0.66    Ca    9.2      13 May 2022 06:30                          11.5   10.42 )-----------( 596      ( 13 May 2022 06:30 )             35.8

## 2022-05-13 NOTE — PROGRESS NOTE ADULT - SUBJECTIVE AND OBJECTIVE BOX
POST OPERATIVE DAY #: 4    86y Female  s/p Right Hip ORIF                 SUBJECTIVE: Patient seen and examined at bedside.     Pain:  well controlled       Pain scale:  2 /10  Denies CP, SOB, N/V/D, weakness, numbness   No new complains     OBJECTIVE:     Vital Signs Last 24 Hrs  T(C): 36.5 (13 May 2022 07:55), Max: 36.8 (12 May 2022 15:15)  T(F): 97.7 (13 May 2022 07:55), Max: 98.2 (12 May 2022 15:15)  HR: 82 (13 May 2022 07:55) (82 - 89)  BP: 167/84 (13 May 2022 07:55) (124/78 - 167/84)  BP(mean): --  RR: 18 (13 May 2022 07:55) (18 - 18)  SpO2: 97% (13 May 2022 07:55) (96% - 97%)    Affected extremity: RLE         Dressing: silverlon.clean/dry/intact                              Sensation: intact to light touch          Motor exam:  5 / 5 Tibialis Anterior/Gastrocnemius-Soleus, EHL/FHL    + 1x1 blister by superior dressing border          warm, well-perfused; capillary refill < 3 seconds         negative calf tenderness B/L LE      LABS:                        11.5   10.42 )-----------( 596      ( 13 May 2022 06:30 )             35.8     05-13    140  |  103  |  16  ----------------------------<  105<H>  4.1   |  30  |  0.66    Ca    9.2      13 May 2022 06:30        I&O's Detail      MEDICATIONS:  Infection prophylaxis:  cefTRIAXone   IVPB      cefTRIAXone   IVPB 1000 milliGRAM(s) IV Intermittent every 24 hours    Pain management:  acetaminophen     Tablet .. 1000 milliGRAM(s) Oral every 8 hours  celecoxib 200 milliGRAM(s) Oral every 12 hours  melatonin 3 milliGRAM(s) Oral at bedtime PRN  traMADol 50 milliGRAM(s) Oral every 4 hours PRN  traMADol 100 milliGRAM(s) Oral every 6 hours PRN    DVT prophylaxis:   aspirin enteric coated 81 milliGRAM(s) Oral daily  enoxaparin Injectable 40 milliGRAM(s) SubCutaneous every 24 hours      RADIOLOGY & ADDITIONAL STUDIES:    ASSESSMENT AND PLAN:     - Analgesic pain control  - DVT prophylaxis: ASA 81 mg daily   Lovenox 40mg daily    SCDs       - Pain Management: Celebrex 200mg twice a day x 21 days   - PT/OT: Weight Bearing Status:  Weight bearing as tolerated, OOBTC       -  Incentive spirometry  - Advance diet as tolerated  - Hospitalist is following  -  Follow up labs  -  Disposition:    Subacute Rehab

## 2022-05-13 NOTE — PROGRESS NOTE ADULT - ASSESSMENT
86F with hx of lung CA (s/p CELESTE resection) and breast CA (s/p bilateral mastectomies and tamoxifen), Escobar's esophagus, HTN, asthma and COPD, PAD who presents with right hip pain after a fall.   Found to have a right intertrochanteric fracture.      Possible Acute Cystitis  UA reviewed: +epithelial cells taking from canister, not a clean catch; unable to obtain clear sample.   However pt reporting increased shakiness and some lightheadedness; no urinary sx.   Pt also reporting hx of frequent UTIs in the past  At this time will opt to treat w/ short course of Abx.   Plan:   UCx if possible to obtain clean sample, otherwise will empirically treat  C/w ceftriaxone 1gm q24h, switch to PO cefpodoxime 100mg BID to complete x3 day course w/ last day of Abx on 5/13; last day today    Right intertrochanteric fracture  S/P open reduction internal fixation hip right on 5/9  Ortho f/u  Pending rehab    D/c planning per primary team  D/w Dr. Sierra    Infectious Diseases will continue to follow. Please call with any questions.   Nolvia Jean M.D.  [facility], Division of Infectious Diseases [phone]

## 2022-05-13 NOTE — PROGRESS NOTE ADULT - SUBJECTIVE AND OBJECTIVE BOX
[facility], Division of Infectious Diseases  GERRY Ivan Y. Patel, S. Shah, G. [facility]  [phone]    Name: STEFANO COBOS  Age: 86y  Gender: Female  MRN: [id number]    Interval History:  Patient seen and examined at bedside this morning  No acute overnight events. Afebrile  Feeling better  Notes reviewed    Antibiotics:  cefTRIAXone   IVPB      cefTRIAXone   IVPB 1000 milliGRAM(s) IV Intermittent every 24 hours      Medications:  acetaminophen     Tablet .. 1000 milliGRAM(s) Oral every 8 hours  ALBUTerol    90 MICROgram(s) HFA Inhaler 2 Puff(s) Inhalation every 6 hours PRN  aluminum hydroxide/magnesium hydroxide/simethicone Suspension 30 milliLiter(s) Oral every 4 hours PRN  aspirin enteric coated 81 milliGRAM(s) Oral daily  atorvastatin 10 milliGRAM(s) Oral at bedtime  beclomethasone  80 MICROgram(s) Inhaler 3 Puff(s) Inhalation every 12 hours  bisacodyl Suppository 10 milliGRAM(s) Rectal once PRN  cefTRIAXone   IVPB      cefTRIAXone   IVPB 1000 milliGRAM(s) IV Intermittent every 24 hours  celecoxib 200 milliGRAM(s) Oral every 12 hours  enoxaparin Injectable 40 milliGRAM(s) SubCutaneous every 24 hours  fluticasone propionate 50 MICROgram(s)/spray Nasal Spray 1 Spray(s) Both Nostrils two times a day  hydrocodone/homatropine Syrup 5 milliLiter(s) Oral every 6 hours PRN  magnesium hydroxide Suspension 30 milliLiter(s) Oral daily PRN  melatonin 3 milliGRAM(s) Oral at bedtime PRN  metoprolol succinate ER 25 milliGRAM(s) Oral daily  montelukast 10 milliGRAM(s) Oral daily  pantoprazole    Tablet 40 milliGRAM(s) Oral two times a day  Pepcid Complete 1 Tablet(s) 1 Tablet(s) Chew two times a day  polyethylene glycol 3350 17 Gram(s) Oral at bedtime  senna 2 Tablet(s) Oral at bedtime  tiotropium 18 MICROgram(s) Capsule 1 Capsule(s) Inhalation daily  traMADol 50 milliGRAM(s) Oral every 4 hours PRN  traMADol 100 milliGRAM(s) Oral every 6 hours PRN      Review of Systems:  Review of systems otherwise negative except as previously noted.    Allergies: adhesives (Hives)  Advair Diskus (Rash)  Flovent (Rash)  Percocet 10/325 (Pruritus; Hives)  Pulmicort Flexhaler (Rash)    For details regarding the patient's past medical history, social history, family history, and other miscellaneous elements, please refer the initial infectious diseases consultation and/or the admitting history and physical examination for this admission.    Objective:  Vitals:   T(C): 36.5 (05-13-22 @ 07:55), Max: 36.8 (05-12-22 @ 15:15)  HR: 82 (05-13-22 @ 07:55) (82 - 89)  BP: 167/84 (05-13-22 @ 07:55) (136/71 - 167/84)  RR: 18 (05-13-22 @ 07:55) (18 - 18)  SpO2: 97% (05-13-22 @ 07:55) (96% - 97%)    Physical Examination:  General: no acute distress  HEENT: NC/AT, EOMI  Cardio: S1, S2 heard, RRR, no murmurs  Resp: breath sounds heard bilaterally, no rales, wheezes or rhonchi  Abd: soft, NT, N  Ext: no edema or cyanosis  Skin: warm, dry, no visible rash    Laboratory Studies:  CBC:                       11.5   10.42 )-----------( 596      ( 13 May 2022 06:30 )             35.8     CMP: 05-13    140  |  103  |  16  ----------------------------<  105<H>  4.1   |  30  |  0.66    Ca    9.2      13 May 2022 06:30            Microbiology: reviewed      Radiology: reviewed

## 2022-05-13 NOTE — PROGRESS NOTE ADULT - PROVIDER SPECIALTY LIST ADULT
Cardiology
Pulmonology
Cardiology
Hospitalist
Hospitalist
Orthopedics
Cardiology
Hospitalist
Hospitalist
Infectious Disease
Infectious Disease
Orthopedics
Orthopedics
Pulmonology
Hospitalist

## 2022-05-13 NOTE — PROGRESS NOTE ADULT - ASSESSMENT
86F with hx of lung CA (s/p CELESTE resection) and breast CA (s/p bilateral mastectomies and tamoxifen), Escobar's esophagus, HTN, asthma and COPD, PAD who presents with right hip pain after a fall.   Found to have a right intertrochanteric fracture.      Right intertrochanteric fracture   S/P open reduction internal fixation hip right on 5/9  -doing well post op-EKG NSR nonspecific changes  -pain control per ortho  -dvt ppx: lovenox 40mg daily x 14 days per ortho protocol post fracture    Abnormal Urinalysis: false positive with epithelial cells taking from canister, not a clean cath  -difficult to obtain straight cath give her fracture and no UTI symptoms so will hold off for now, reassess as needed  Leukocytosis: reactive to stress, fracture, surgery no s/s of infection  -UA as above, not clean catch and no UTI symptoms  -ID consulted, started on Rocephin, completes abx today     HTN:   BP has been trending lower and did drop this morning, gentle hydration   - c/w metoprolol XL  - HOLD ARB, diuretic and Nifedipine. May need to adjust BP regimen outpatient if BP remains low  - restarted ASA and losartan post op per cards/ortho  - cont with atorvastatin    Lymphedema  - Cont diuretic     Asthma/COPD  - Qvar because shes allergic to other inhalers  - cont with Montelukast  - cont with albuterol inh  - cont with spiriva   -pulm following     Barrette's esophagus  - cont with pantoprazole    Preventive measures  lovenox    dispo: Symptoms improved, stable for dc to [facility] with close outpatient follow up 86F with hx of lung CA (s/p CELESTE resection) and breast CA (s/p bilateral mastectomies and tamoxifen), Escobar's esophagus, HTN, asthma and COPD, PAD who presents with right hip pain after a fall.   Found to have a right intertrochanteric fracture.      Right intertrochanteric fracture   S/P open reduction internal fixation hip right on 5/9  -doing well post op-EKG NSR nonspecific changes  -pain control per ortho  -dvt ppx: lovenox 40mg daily x 14 days per ortho protocol post fracture    Abnormal Urinalysis: false positive with epithelial cells taking from canister, not a clean cath  -difficult to obtain straight cath give her fracture and no UTI symptoms so will hold off for now, reassess as needed  Leukocytosis: reactive to stress, fracture, surgery no s/s of infection  -UA as above, not clean catch and no UTI symptoms  -ID consulted, started on Rocephin, completes abx today     HTN:   BP on higher side today  - c/w metoprolol XL  - HOLD ARB, diuretic and Nifedipine.  - Per cardio, CCB can be restarted if orthostatics neg  - cont with atorvastatin    Lymphedema  - Cont diuretic     Asthma/COPD  - Qvar because shes allergic to other inhalers  - cont with Montelukast  - cont with albuterol inh  - cont with spiriva   -pulm following     Barrette's esophagus  - cont with pantoprazole    Preventive measures  lovenox    dispo: Symptoms improved, stable for dc to [facility] with close outpatient follow up

## 2022-05-13 NOTE — PROGRESS NOTE ADULT - TIME BILLING
The total amount of time listed was spent reviewing the hospital notes, laboratory values, imaging findings, assessing/counseling the patient, discussing with consultant physicians, case management, social work, and nursing staff.

## 2022-05-16 ENCOUNTER — NON-APPOINTMENT (OUTPATIENT)
Age: 87
End: 2022-05-16

## 2022-05-26 ENCOUNTER — RX RENEWAL (OUTPATIENT)
Age: 87
End: 2022-05-26

## 2022-06-03 ENCOUNTER — APPOINTMENT (OUTPATIENT)
Dept: PULMONOLOGY | Facility: CLINIC | Age: 87
End: 2022-06-03
Payer: MEDICARE

## 2022-06-03 VITALS
WEIGHT: 137 LBS | OXYGEN SATURATION: 95 % | TEMPERATURE: 98.5 F | HEART RATE: 88 BPM | BODY MASS INDEX: 25.21 KG/M2 | DIASTOLIC BLOOD PRESSURE: 73 MMHG | SYSTOLIC BLOOD PRESSURE: 112 MMHG | HEIGHT: 62 IN

## 2022-06-03 PROCEDURE — 99214 OFFICE O/P EST MOD 30 MIN: CPT | Mod: 25

## 2022-06-03 PROCEDURE — 71046 X-RAY EXAM CHEST 2 VIEWS: CPT

## 2022-06-03 NOTE — ASSESSMENT
[FreeTextEntry1] : History of asthma increasing chronic cough\par History of hoarseness from conventional inhaled steroids.  Change Qvar to Alvesco hopefully will be covered

## 2022-06-03 NOTE — HISTORY OF PRESENT ILLNESS
[TextBox_4] : was in [facility] for fall, hip surgery\par \par Currently reporting increasing cough and congestion.\par Having some difficulty getting Qvar inhaler\par \par

## 2022-06-14 ENCOUNTER — APPOINTMENT (OUTPATIENT)
Dept: INTERNAL MEDICINE | Facility: CLINIC | Age: 87
End: 2022-06-14
Payer: MEDICARE

## 2022-06-14 VITALS
RESPIRATION RATE: 14 BRPM | WEIGHT: 132 LBS | TEMPERATURE: 98 F | HEART RATE: 89 BPM | DIASTOLIC BLOOD PRESSURE: 72 MMHG | BODY MASS INDEX: 24.29 KG/M2 | SYSTOLIC BLOOD PRESSURE: 122 MMHG | HEIGHT: 62 IN | OXYGEN SATURATION: 97 %

## 2022-06-14 LAB
APPEARANCE: CLEAR
BACTERIA: NEGATIVE
BILIRUBIN URINE: NEGATIVE
BLOOD URINE: NEGATIVE
COLOR: COLORLESS
GLUCOSE QUALITATIVE U: NEGATIVE
HYALINE CASTS: 0 /LPF
KETONES URINE: NEGATIVE
LEUKOCYTE ESTERASE URINE: NEGATIVE
MICROSCOPIC-UA: NORMAL
NITRITE URINE: NEGATIVE
PH URINE: 7
PROTEIN URINE: NEGATIVE
RED BLOOD CELLS URINE: 1 /HPF
SPECIFIC GRAVITY URINE: 1.01
SQUAMOUS EPITHELIAL CELLS: 0 /HPF
UROBILINOGEN URINE: NORMAL
WHITE BLOOD CELLS URINE: 0 /HPF

## 2022-06-14 PROCEDURE — 99214 OFFICE O/P EST MOD 30 MIN: CPT

## 2022-06-14 NOTE — ASSESSMENT
[FreeTextEntry1] : Osteoporosis- follow up with hematology\par UTI- check urine today\par Asthma. cough- follow up with pulmonary

## 2022-06-14 NOTE — HISTORY OF PRESENT ILLNESS
[de-identified] : Pt here today for follow up.\par Pt admitted with fall and fx hip. Pt brought to the OR for ORIF. \par Pt with UTI- given antibiotics\par Doing better.\par Now homo with help.\par Pt followed by hematology- pt to start the Prolia.

## 2022-06-15 ENCOUNTER — TRANSCRIPTION ENCOUNTER (OUTPATIENT)
Age: 87
End: 2022-06-15

## 2022-06-15 LAB — BACTERIA UR CULT: NORMAL

## 2022-06-22 ENCOUNTER — APPOINTMENT (OUTPATIENT)
Dept: PULMONOLOGY | Facility: CLINIC | Age: 87
End: 2022-06-22

## 2022-06-22 ENCOUNTER — APPOINTMENT (OUTPATIENT)
Dept: CARDIOLOGY | Facility: CLINIC | Age: 87
End: 2022-06-22
Payer: MEDICARE

## 2022-06-22 VITALS
HEIGHT: 62 IN | DIASTOLIC BLOOD PRESSURE: 71 MMHG | SYSTOLIC BLOOD PRESSURE: 114 MMHG | HEART RATE: 85 BPM | OXYGEN SATURATION: 95 % | BODY MASS INDEX: 24.29 KG/M2 | WEIGHT: 132 LBS

## 2022-06-22 DIAGNOSIS — R00.0 TACHYCARDIA, UNSPECIFIED: ICD-10-CM

## 2022-06-22 DIAGNOSIS — I89.0 LYMPHEDEMA, NOT ELSEWHERE CLASSIFIED: ICD-10-CM

## 2022-06-22 PROCEDURE — 99214 OFFICE O/P EST MOD 30 MIN: CPT

## 2022-06-22 NOTE — HISTORY OF PRESENT ILLNESS
[FreeTextEntry1] : I saw Meg Flanagan in the office today for cardiac follow-up.  She was seen in the emergency room 11/21 twice for sinus tachycardia and weakness.  It was thought that she may be dehydrated.  Lab work and ECG were unremarkable.  CTA of the chest was negative for PE or any acute pulmonary process.  proBNP was normal.  She was treated with hydration and started on low-dose beta-blocker.  She had an echocardiogram in our office  11/21that demonstrated ejection fraction of 66%.  There was mild to moderate MR and MS.  There was mild AI without any significant aortic stenosis.  There was mild to moderate TR with a PA pressure of 40.  The left ventricular outflow tract gradient of 49 mmHg with chordal S.A.M.  Stage I diastolic dysfunction.  She had a carotid Doppler 12/21 that showed mild plaque. ZOEY was normal.\par \par Has a history of lung CA treated with left lower lobe lobectomy, and a history of breast cancer.  No known history of heart disease.  Does have history of hypertension.  Eyes any history of smoking, or family history of heart disease.\par \par Last visit I reduced her losartan to 50 mg once a day and started torsemide 10 mg once a day.  In the emergency room her sodium had dropped to 130.  She is now taking the water pill every other day and is definitely helping her lymphedema.  Is having no symptoms of tachycardia or dizziness.  She does have a fractured vertebra has been taking Advil 400 mg several times a day.  Work 2/22 demonstrated a sodium of 140.  Normal CMP and TSH.  Cholesterol 153, triglycerides 94, HDL 65, and LDL 69.  A1c was 5.6.\par \par Has significant osteoporosis and has a compression fracture of the spine and pelvis.  She is following with Dr. Sagastume.  He may, 2022 the patient had an accidental fall and broke her right hip.  Still requiring pinning but not replacement.  She was in rehab and is now walking with a walker.\par \par Blood pressure is 110/70 and dropped to 94 systolic without symptoms.  She does not appear to be dehydrated.  No symptoms of lightheadedness.

## 2022-06-22 NOTE — CARDIOLOGY SUMMARY
[de-identified] : 11/21- RST, nonspecific repolarization [de-identified] : 11/21- EF 66^, mild-mod MR/MS, PG 19, mild A!, mild-mod TR, PAP=40, LVOT 49, Choddal CORNEL, I DD [de-identified] : Carotid Doppler 12/21- mild plaque\par ZOEY - Normal

## 2022-06-22 NOTE — DISCUSSION/SUMMARY
[FreeTextEntry1] : Clinically the patient is doing well.  She has no chest pain, shortness of breath, palpitation.  Her blood pressure is on the low side with mild orthostatic hypotension.  We have on the very low dose of torsemide because of peripheral edema and she does not appear dehydrated.  I am going to reduce her losartan from 50 to 25 mg once a day.  Other medication remain the same.  Did go over her blood work, and her cardiac testing, and I answered all of her questions.\par \par If all is well we will see her again in 3 months.

## 2022-07-12 ENCOUNTER — APPOINTMENT (OUTPATIENT)
Dept: CT IMAGING | Facility: CLINIC | Age: 87
End: 2022-07-12

## 2022-07-12 PROCEDURE — 71250 CT THORAX DX C-: CPT

## 2022-07-13 ENCOUNTER — APPOINTMENT (OUTPATIENT)
Dept: PULMONOLOGY | Facility: CLINIC | Age: 87
End: 2022-07-13

## 2022-07-13 DIAGNOSIS — K52.9 NONINFECTIVE GASTROENTERITIS AND COLITIS, UNSPECIFIED: ICD-10-CM

## 2022-07-13 PROCEDURE — 99214 OFFICE O/P EST MOD 30 MIN: CPT

## 2022-07-14 ENCOUNTER — TRANSCRIPTION ENCOUNTER (OUTPATIENT)
Age: 87
End: 2022-07-14

## 2022-07-14 LAB
RAPID RVP RESULT: NOT DETECTED
SARS-COV-2 RNA PNL RESP NAA+PROBE: NOT DETECTED

## 2022-07-14 NOTE — HISTORY OF PRESENT ILLNESS
[TextBox_4] : Follow-up for asthma chronic cough and pulmonary nodules.  At last visit because of cost concern change patient's inhaler from Qvar to Alvesco.  Did not feel well with his medication about 2 weeks ago developed a bad cough requesting change back to Qvar which was done yesterday and she feels better.  Did not report recent fever or chills but had a bad cough for 2 weeks.  She went for her follow-up chest CT for lung  cancer and pulmonary nodules yesterday.\par

## 2022-07-14 NOTE — PROCEDURE
[FreeTextEntry1] : CT chest reviewed official report not yet available.  To my review there appears to be increased peripheral markings consistent with inflammation possibly recent infection.

## 2022-07-14 NOTE — ASSESSMENT
[FreeTextEntry1] : Check official chest CT report but would not be surprised if a comment is made about infection.  RVP sent today continue Qvar.

## 2022-07-19 ENCOUNTER — APPOINTMENT (OUTPATIENT)
Dept: THORACIC SURGERY | Facility: CLINIC | Age: 87
End: 2022-07-19

## 2022-07-19 PROCEDURE — 99443: CPT

## 2022-07-19 NOTE — REASON FOR VISIT
[Follow-Up: _____] : a [unfilled] follow-up visit [Home] : at home, [unfilled] , at the time of the visit. [Verbal consent obtained from patient] : the patient, [unfilled] [FreeTextEntry4] : Marilu Lockett NP

## 2022-07-19 NOTE — CONSULT LETTER
[Courtesy Letter:] : I had the pleasure of seeing your patient, [unfilled], in my office today. [Please see my note below.] : Please see my note below. [Consult Closing:] : Thank you very much for allowing me to participate in the care of this patient.  If you have any questions, please do not hesitate to contact me. [Sincerely,] : Sincerely, [FreeTextEntry2] : Dr. Koko Peck (PCP)\par Dr. Waqar Sagastume (Onc)\par Dr. Fredrick Salinas (GI)\par Dr. Morales (Pulm)\par  [FreeTextEntry3] : Thom Aguilar MD, FACS \par Chief, Division of Thoracic Surgery \par Director, Minimally Invasive Thoracic Surgery \par Department of Cardiovascular and Thoracic Surgery \par [facility] \par , Cardiovascular and Thoracic Surgery\par \par \par

## 2022-07-19 NOTE — ASSESSMENT
[FreeTextEntry1] : 86 year old female S/P Left VATS, CELESTE wedge resection on 7/6/15 with intraoperative pathology negative for malignancy. Final pathology revealed 2 separate adenocarcinomas. First tumor measured 2.0 x 1.0 cm and was a T1a Nx Mx. Second tumor measured 1.1 x 1.0 cm and was a T1a Nx Mx. On 7/31/2015, patient underwent a redo Left VATS, completion LULobectomy, MLND for 2 separate adenocarcinomas, pathology negative for residual carcinoma. \par \par CT Chest on 7/12/2022:\par - New clustered centrilobular and branching linear densities in LLL\par - Stable mucoid impaction in the lateral RML. \par - Few stable small lung nodules including RLL 4 mm solid nodule\par - No endobronchial nodule, no pleural effusion, no thoracic adenopathy\par - Moderate-sized hiatus hernia\par \par I have independently reviewed the medical records and imaging at the time of this office consultation, and discussed the following interpretations and recommendations with the patient:\par - Follow up with Dr. Morales for cough\par - Return to office for follow up with CT Chest in 6-12 months\par \par Recommendations reviewed with patient during this office visit, and all questions answered; Patient instructed on the importance of follow up and verbalizes understanding.\par \par I personally performed the services described in the documentation, reviewed the documentation recorded by the scribe in my presence and it accurately and completely records my words and actions.\par \par I, Marilu Lockett, am scribing for and the presence of JESS White the following sections HISTORY OF PRESENT ILLNESSES, PAST MEDICAL/FAMILY/SOCIAL HISTORY; REVIEW OF SYSTEMS; VITAL SIGNS; PHYSICAL EXAM; DISPOSITION.

## 2022-07-19 NOTE — HISTORY OF PRESENT ILLNESS
[FreeTextEntry1] : Ms. Londono is an 86 year old female w/ PMHx significant for Breast cancer diagnosed in 1999 S/P Bilateral mastectomy and tamoxifen.\par \par She is S/P Left VATS, CELESTE wedge resection on 7/6/15 with intraoperative pathology negative for malignancy. Final pathology revealed 2 separate adenocarcinomas. First tumor measured 2.0 x 1.0 cm and was a T1a Nx Mx. Second tumor measured 1.1 x 1.0 cm and was a T1a Nx Mx. On 7/31/2015, patient underwent a redo Left VATS, completion of left upper lobectomy and mediastinal lymph node dissection for 2 separate adenocarcinomas. \par \par CXR on 12/2/19: \par - Right lung is clear; left lung volume loss with postsurgical change including chain sutures\par - No pneumothorax is seen, no pleural effusion \par - Subcentimeter right lower lobe and lingular nodules reported on the CT scan are not able to be seen\par \par CT chest on 6/16/2020\par - Unchanged three solid nodules measuring less than 0.4 cm are noted within the RML and both lower lobes\par - No pleural effusions are noted\par - Small to moderate size hiatal hernia is noted\par \par CT chest on 8/25/21:\par - Small lymph node in the left axillary region is unchanged when compared to previous exam\par - Stable, post op changes\par - Few less than 0.4 cm nodules in RML and both lower are noted, unchanged compared to previous exam\par \par CT Chest on 7/12/2022:\par - New clustered centrilobular and branching linear densities in LLL\par - Stable mucoid impaction in the lateral RML. \par - Few stable small lung nodules including RLL 4 mm solid nodule\par - No endobronchial nodule, no pleural effusion, no thoracic adenopathy\par - Moderate-sized hiatus hernia\par \par Patient presents for follow up via telephonic visit.  She reports a chronic dry cough has changed and has now become a wet, nonproductive cough x 7+ days.  She was seen by Dr. Morales on 7/13 and RVP was negative.  She reports the cough is unchanged since that time.  She denies any fever, chills, shortness of breath, chest pain, hemoptysis, or recent illness.

## 2022-07-25 ENCOUNTER — APPOINTMENT (OUTPATIENT)
Dept: UROGYNECOLOGY | Facility: CLINIC | Age: 87
End: 2022-07-25

## 2022-07-25 DIAGNOSIS — N81.9 FEMALE GENITAL PROLAPSE, UNSPECIFIED: ICD-10-CM

## 2022-07-25 PROCEDURE — 99213 OFFICE O/P EST LOW 20 MIN: CPT

## 2022-07-26 PROBLEM — N81.9 GENITAL PROLAPSE: Status: ACTIVE | Noted: 2018-06-04

## 2022-07-27 NOTE — DISCUSSION/SUMMARY
[FreeTextEntry1] : Pelvic prolapse:\par -Continue with Gellhorn LS # 3.\par -Precaution and instructions were reviewed.\par \par Vaginal atrophy:\par -Continue Estrace cream and Replens\par \par Follow up in 3 months or sooner if needed. \par Advised pt to call office with any questions or concerns, pt verbalized understanding.\par

## 2022-07-27 NOTE — HISTORY OF PRESENT ILLNESS
[FreeTextEntry1] : Meg, 85y/o presents to the office for follow up for her pelvic prolapse. Prolapse is being supported with a GH LS #3.  PT is doing well with it and happy with support.  She denies any vaginal bleeding, pelvic pain or discomfort. Denies any issues with urination or moving her bowels.  Pt also using Estradiol cream as Rx.\par \par \par

## 2022-07-27 NOTE — PHYSICAL EXAM
[No Acute Distress] : in no acute distress [Well developed] : well developed [Well Nourished] : ~L well nourished [Good Hygeine] : demonstrates good hygeine [Oriented x3] : oriented to person, place, and time [Normal Memory] : ~T memory was ~L unimpaired [Normal Mood/Affect] : mood and affect are normal [Warm and Dry] : was warm and dry to touch [No Lesions] : no lesions were seen on the external genitalia [Vulvar Atrophy] : vulvar atrophy [Labia Majora] : were normal [Labia Minora] : were normal [Normal] : was normal [Atrophy] : atrophy [Erythematous] : erythema [Cystocele] : a cystocele [Discharge] : a  ~M vaginal discharge was present [Scant] : scant [Ananth] : yellow [Thin] : thin [No Bleeding] : there was no active vaginal bleeding [Soft] :  the cervix was soft [Anxiety] : patient is not anxious [Tenderness] : ~T no ~M abdominal tenderness observed [Distended] : not distended [de-identified] : Gait steady with walker

## 2022-07-27 NOTE — PROCEDURE
[Good Fit] : fits well [Erythema] : erythema noted [Discharge] : there is vaginal discharge [Pessary Inserted] : inserted [Pessary Washed] : washed [None] : no bleeding [Medication Review] : Medicaiton Review: Patient verbalizes understanding of risks and benefits [Bowel Management] : Bowel Management: patient verbalizes understanding of daily dietary fiber intake [Refit] : refit is not needed [Erosion] : no evidence of erosion [Infection] : no evidence of infection [FreeTextEntry1] : Rohit WYATT #  3 [de-identified] : Erythema on posterior vaginal walls [de-identified] : Scant yellow discharge [FreeTextEntry3] : Continue estrogen use, Replens [FreeTextEntry4] : Advised avoid constipation/straining w/ daily fiber/fluid intake and stool softeners daily PRN [FreeTextEntry8] : Continue daily pericare.

## 2022-08-05 ENCOUNTER — RX RENEWAL (OUTPATIENT)
Age: 87
End: 2022-08-05

## 2022-08-24 ENCOUNTER — APPOINTMENT (OUTPATIENT)
Dept: GASTROENTEROLOGY | Facility: CLINIC | Age: 87
End: 2022-08-24

## 2022-08-24 VITALS
HEART RATE: 108 BPM | WEIGHT: 134 LBS | DIASTOLIC BLOOD PRESSURE: 86 MMHG | SYSTOLIC BLOOD PRESSURE: 144 MMHG | OXYGEN SATURATION: 95 % | BODY MASS INDEX: 24.66 KG/M2 | HEIGHT: 62 IN

## 2022-08-24 DIAGNOSIS — K44.9 DIAPHRAGMATIC HERNIA W/OUT OBSTRUCTION OR GANGRENE: ICD-10-CM

## 2022-08-24 DIAGNOSIS — I73.9 PERIPHERAL VASCULAR DISEASE, UNSPECIFIED: ICD-10-CM

## 2022-08-24 DIAGNOSIS — K57.30 DIVERTICULOSIS OF LARGE INTESTINE W/OUT PERFORATION OR ABSCESS W/OUT BLEEDING: ICD-10-CM

## 2022-08-24 DIAGNOSIS — D12.6 BENIGN NEOPLASM OF COLON, UNSPECIFIED: ICD-10-CM

## 2022-08-24 PROCEDURE — 99205 OFFICE O/P NEW HI 60 MIN: CPT

## 2022-08-24 NOTE — PHYSICAL EXAM
[General Appearance - Alert] : alert [General Appearance - In No Acute Distress] : in no acute distress [Sclera] : the sclera and conjunctiva were normal [Extraocular Movements] : extraocular movements were intact [Outer Ear] : the ears and nose were normal in appearance [Hearing Threshold Finger Rub Not Otter Tail] : hearing was normal [Neck Appearance] : the appearance of the neck was normal [Neck Cervical Mass (___cm)] : no neck mass was observed [Auscultation Breath Sounds / Voice Sounds] : lungs were clear to auscultation bilaterally [Heart Rate And Rhythm] : heart rate was normal and rhythm regular [Heart Sounds] : normal S1 and S2 [Heart Sounds Gallop] : no gallops [Murmurs] : no murmurs [Heart Sounds Pericardial Friction Rub] : no pericardial rub [Bowel Sounds] : normal bowel sounds [Abdomen Soft] : soft [Abdomen Tenderness] : non-tender [Abdomen Mass (___ Cm)] : no abdominal mass palpated [Cervical Lymph Nodes Enlarged Posterior Bilaterally] : posterior cervical [Cervical Lymph Nodes Enlarged Anterior Bilaterally] : anterior cervical [Supraclavicular Lymph Nodes Enlarged Bilaterally] : supraclavicular [Nail Clubbing] : no clubbing  or cyanosis of the fingernails [Skin Color & Pigmentation] : normal skin color and pigmentation [] : no rash [Oriented To Time, Place, And Person] : oriented to person, place, and time [Impaired Insight] : insight and judgment were intact [Affect] : the affect was normal [FreeTextEntry1] : uses a walker

## 2022-08-24 NOTE — HISTORY OF PRESENT ILLNESS
[de-identified] : 85 y/o woman with Hx of asthma, GERD, Escobar's esophagus, COPD, lung cancer s/p wedge resection, breast cancer s/p mastectomy, PAD, osteoporosis, HTN, HLD who presents for evaluation for abnormal CT.  \par \par CT chest on 7/2022:  Moderate size hiatal hernia.  New clustered small airways impaction in her  lower lob likely related to infection or aspiration.  Additional lung nodules measuring up to 4 mm stable.  Her pulmonologist  believes lung finding maybe due to reflux.  \par \par She has been taking Pantoprazole 40 mg twice a day - years since being diagnosed with Escobar's.  She also take Pepcid.  \par \par When she has reflux - she feels acid burning in back of throat to nostrils - Lime Jell-O and Pepcid will - She has difficult swallowing large pills. \par \par She tries to follow a low salt diet - she has lost weight ~12 lbs since cutting back on salt since a year. \par \par Years of constipation - better when eating apples and dates.  \par No melena and no hematochezia. \par \par Colonoscopy by Dr. Delvalle on 5/2017:  Diverticulosis throughout the colon.  2- 2.5 cm sessile polyp in rectosigmoid colon removed by piecemeal cautery.  Repeal flexible sigmoidoscopy within several months. \par \par EGD by Dr. Delvalle in 5/2015:  Escobar's esophagus, hiatal hernia. gastric polyps.\par \par Father had colon cancer - and had pancreatic cancer.  Patient denies family history of other GI cancers.\par \par All other review of systems are negative. \par

## 2022-08-24 NOTE — ASSESSMENT
[FreeTextEntry1] : IMPRESSION: \par #  Abnormal imaging of chest \par -  CT chest on 7/2022: Moderate size hiatal hernia. New clustered small airways impaction in her lower lob likely related to infection or aspiration. Additional lung nodules measuring up to 4 mm stable. Her pulmonologist believes lung finding maybe due to reflux. \par -  Pantoprazole 40 mg twice a day - years since being diagnosed with Escobar's. She also take Pepcid. \par -  When she has reflux - she feels acid burning in back of throat to nostrils - Lime Jell-O and Pepcid will \par - She has difficult swallowing large pills. \par -  she has lost weight ~12 lbs since cutting back on salt since a year. 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 Delvalle in 5/2015:  Escobar's esophagus (bx showed BE without Dysplasia), hiatal hernia. several small gastric polyps. On written report prior biopsies of BE suggested the possibility of dysplasia although immunohistochemical stains negative.  Does not recall having a follow up upper endoscopy after 2015. \par -  EGD by Dr. Delvalle on 2/2015:  esophagus bx indefinite for dysplasia.  Gastric bx negative for HP, Duodenal bx negative for celiac disease. \par -  EUS on 2/2013 by Dr. Antoni Cruz:  Long segment Escobar's esophagus from 25 cm to 33 cm - bx at 31 showed Escobar's cells with deep crypt dysplasia.  Large hiatal hernia. subepithelial lesion in antrum of stomach 7 mm x11 mm originating for layer 3 highly suspicious fo aberrant  pancreas.  Further EUS isn't necessary.  \par \par #  History of large colon polyp\par -  Colonoscopy by Dr. Delvalle on 5/2017:  Diverticulosis throughout the colon.  2- 2.5 cm sessile polyp in rectosigmoid colon removed by piecemeal cautery (tubular adenoma).  Repeal flexible sigmoidoscopy within several months.  Does not remember having follow up flexible sigmoidoscopy. \par \par #  Father had colon cancer. \par \par #  Comorbidities:  Asthma, COPD, lung cancer s/p wedge resection, breast cancer s/p mastectomy,  PAD, osteoporosis with fractures, HTN, HLD\par \par \par PLAN: \par Patient on PPI twice 40 mg twice a day - discussed correction of hiatal hernia - contact information. \par \par Discussed reevaluation of Long segment BE - with advanced GI - contact information.  Long term side effects of PPI reviewed. She will establish with endocrinologist for osteoporosis.   \par \par Discussed a colonoscopy to r/o colon polyps, colon cancer under anesthesia risks/benefits/alternatives reviewed - she would like to start off with a CT colonography. \par \par High fiber diet for diverticulosis.

## 2022-09-07 ENCOUNTER — APPOINTMENT (OUTPATIENT)
Dept: PULMONOLOGY | Facility: CLINIC | Age: 87
End: 2022-09-07

## 2022-09-13 ENCOUNTER — NON-APPOINTMENT (OUTPATIENT)
Age: 87
End: 2022-09-13

## 2022-09-15 ENCOUNTER — APPOINTMENT (OUTPATIENT)
Dept: INTERNAL MEDICINE | Facility: CLINIC | Age: 87
End: 2022-09-15

## 2022-09-15 VITALS
HEART RATE: 88 BPM | SYSTOLIC BLOOD PRESSURE: 144 MMHG | DIASTOLIC BLOOD PRESSURE: 88 MMHG | WEIGHT: 134 LBS | OXYGEN SATURATION: 96 % | BODY MASS INDEX: 26.66 KG/M2 | TEMPERATURE: 97.2 F | HEIGHT: 59.5 IN | RESPIRATION RATE: 16 BRPM

## 2022-09-15 DIAGNOSIS — Z23 ENCOUNTER FOR IMMUNIZATION: ICD-10-CM

## 2022-09-15 PROCEDURE — G0008: CPT

## 2022-09-15 PROCEDURE — 90662 IIV NO PRSV INCREASED AG IM: CPT

## 2022-09-15 PROCEDURE — 99214 OFFICE O/P EST MOD 30 MIN: CPT | Mod: 25

## 2022-09-15 NOTE — ASSESSMENT
[FreeTextEntry1] : HTN- continue Meds- followed by cards.\par Hyperlipidemia- check lipid at cards\par MGUS- to follow up with hem\par Osteoporosis- to see endo\par GERD- follow up with gi.

## 2022-09-15 NOTE — HISTORY OF PRESENT ILLNESS
[de-identified] : Pt followed by gi for reflux disease.  Pt taking Pantoprazole 40mg bid- osmel for advanced endoscopist and ordered for CT colonography\par Does not want to proceed as of now.\par Pt with history of osteoporosis-  osmel to see endo 11/22\par Pt with history of MGUS followed by  hematology

## 2022-09-21 ENCOUNTER — APPOINTMENT (OUTPATIENT)
Dept: CARDIOLOGY | Facility: CLINIC | Age: 87
End: 2022-09-21

## 2022-09-21 ENCOUNTER — NON-APPOINTMENT (OUTPATIENT)
Age: 87
End: 2022-09-21

## 2022-09-21 VITALS
HEIGHT: 59 IN | HEART RATE: 99 BPM | DIASTOLIC BLOOD PRESSURE: 82 MMHG | SYSTOLIC BLOOD PRESSURE: 129 MMHG | WEIGHT: 134 LBS | BODY MASS INDEX: 27.01 KG/M2 | OXYGEN SATURATION: 97 %

## 2022-09-21 DIAGNOSIS — R00.2 PALPITATIONS: ICD-10-CM

## 2022-09-21 PROCEDURE — 99214 OFFICE O/P EST MOD 30 MIN: CPT

## 2022-09-21 PROCEDURE — 93000 ELECTROCARDIOGRAM COMPLETE: CPT

## 2022-09-21 RX ORDER — LOSARTAN POTASSIUM 25 MG/1
25 TABLET, FILM COATED ORAL DAILY
Qty: 90 | Refills: 3 | Status: DISCONTINUED | COMMUNITY
End: 2022-09-21

## 2022-09-21 NOTE — DISCUSSION/SUMMARY
[FreeTextEntry1] : Clinically the patient is doing well.  She has no chest pain, shortness of breath, palpitation.  Her blood pressure has been better, and she is not significantly orthostatic today. Her HR is in the  range.\par \par She will remain on her low-dose of torsemide, and we will monitor for edema.  She did have a mild LVOT gradient/chordal Barak on an echocardiogram last year, and I am repeating the study this month.  She will stop her losartan, and I am increasing her Toprol to 50 mg daily.\par \par She will continue to stay hydrated, and try to be active.  She will be careful when changing position quickly, and avoid falls at all costs.  If no issues, I will see her again in 2 months. [EKG obtained to assist in diagnosis and management of assessed problem(s)] : EKG obtained to assist in diagnosis and management of assessed problem(s)

## 2022-09-21 NOTE — REASON FOR VISIT
[Hypertension] : hypertension [Other: ____] : [unfilled] [Symptom and Test Evaluation] : symptom and test evaluation [Family Member] : family member

## 2022-09-21 NOTE — HISTORY OF PRESENT ILLNESS
[FreeTextEntry1] : I saw Meg Flanagan in the office today for cardiac follow-up.  \par \par She was seen in the emergency room 11/21 twice for sinus tachycardia and weakness.  It was thought that she may be dehydrated.  Lab work and ECG were unremarkable.  CTA of the chest was negative for PE or any acute pulmonary process.  proBNP was normal.  She was treated with hydration and started on low-dose beta-blocker.  She had an echocardiogram in our office  11/21 that demonstrated ejection fraction of 66%.  There was mild to moderate MR and MS.  There was mild AI without any significant aortic stenosis.  There was mild to moderate TR with a PA pressure of 40.  The left ventricular outflow tract gradient of 49 mmHg with chordal S.A.M.  Stage I diastolic dysfunction.  She had a carotid Doppler 12/21 that showed mild plaque. ZOEY was normal.\par alan Has a history of lung CA treated with left lower lobe lobectomy, and a history of breast cancer.  No known history of heart disease.  Does have history of hypertension.  Eyes any history of smoking, or family history of heart disease.\par \par She was last seen by Dr. Hernandez in 6/22.\par In may, 2022 the patient had an accidental fall and broke her right hip.  Still requiring pinning but not replacement.  She was in rehab and is now walking with a walker.\par \par Today, she is feeling quite well.  She has mild dizziness with positional change, though has not passed out.  She has no chest pain or palpitations.  She has not noted any fast heart rates or elevated blood pressure.

## 2022-09-21 NOTE — CARDIOLOGY SUMMARY
[de-identified] : 11/21- RST, nonspecific repolarization [de-identified] : 11/21- EF 66^, mild-mod MR/MS, PG 19, mild A!, mild-mod TR, PAP=40, LVOT 49, Choddal CORNEL, I DD [de-identified] : Carotid Doppler 12/21- mild plaque\par ZOEY - Normal

## 2022-09-26 ENCOUNTER — APPOINTMENT (OUTPATIENT)
Dept: GASTROENTEROLOGY | Facility: CLINIC | Age: 87
End: 2022-09-26

## 2022-10-05 ENCOUNTER — NON-APPOINTMENT (OUTPATIENT)
Age: 87
End: 2022-10-05

## 2022-10-12 ENCOUNTER — APPOINTMENT (OUTPATIENT)
Dept: CARDIOLOGY | Facility: CLINIC | Age: 87
End: 2022-10-12

## 2022-10-12 PROCEDURE — 93306 TTE W/DOPPLER COMPLETE: CPT

## 2022-10-26 ENCOUNTER — APPOINTMENT (OUTPATIENT)
Dept: PULMONOLOGY | Facility: CLINIC | Age: 87
End: 2022-10-26

## 2022-10-26 ENCOUNTER — NON-APPOINTMENT (OUTPATIENT)
Age: 87
End: 2022-10-26

## 2022-10-26 VITALS
DIASTOLIC BLOOD PRESSURE: 72 MMHG | HEIGHT: 59 IN | HEART RATE: 86 BPM | OXYGEN SATURATION: 93 % | WEIGHT: 134 LBS | BODY MASS INDEX: 27.01 KG/M2 | SYSTOLIC BLOOD PRESSURE: 119 MMHG

## 2022-10-26 PROCEDURE — 99213 OFFICE O/P EST LOW 20 MIN: CPT | Mod: 25

## 2022-10-26 PROCEDURE — 94010 BREATHING CAPACITY TEST: CPT

## 2022-10-26 PROCEDURE — ZZZZZ: CPT

## 2022-10-26 RX ORDER — CICLESONIDE 160 UG/1
160 AEROSOL, METERED RESPIRATORY (INHALATION)
Qty: 12.2 | Refills: 5 | Status: DISCONTINUED | COMMUNITY
Start: 2022-06-03 | End: 2022-10-26

## 2022-10-26 NOTE — HISTORY OF PRESENT ILLNESS
[TextBox_4] : Follow-up for asthma chronic cough and pulmonary nodules.  Ongoing issues with chronic cough currently on Qvar.  Also reports nasal congestion and throat congestion.  Not short of breath.  History of lung cancer had nodules on most recent CT scan July 2022 these appear nonmalignant.  Also history of hiatal hernia

## 2022-10-26 NOTE — ASSESSMENT
[FreeTextEntry1] : Overall appears stable.  Is on multiple treatments for cough.  Will not change plan at this time

## 2022-11-22 ENCOUNTER — APPOINTMENT (OUTPATIENT)
Dept: CARDIOLOGY | Facility: CLINIC | Age: 87
End: 2022-11-22

## 2022-11-22 ENCOUNTER — NON-APPOINTMENT (OUTPATIENT)
Age: 87
End: 2022-11-22

## 2022-11-22 VITALS
HEIGHT: 59 IN | DIASTOLIC BLOOD PRESSURE: 77 MMHG | OXYGEN SATURATION: 96 % | SYSTOLIC BLOOD PRESSURE: 116 MMHG | BODY MASS INDEX: 27.42 KG/M2 | WEIGHT: 136 LBS | HEART RATE: 93 BPM

## 2022-11-22 PROCEDURE — 99214 OFFICE O/P EST MOD 30 MIN: CPT

## 2022-11-22 PROCEDURE — 93000 ELECTROCARDIOGRAM COMPLETE: CPT

## 2022-11-22 RX ORDER — METOPROLOL SUCCINATE 25 MG/1
25 TABLET, EXTENDED RELEASE ORAL
Qty: 30 | Refills: 0 | Status: DISCONTINUED | COMMUNITY
Start: 2022-09-10

## 2022-11-22 NOTE — REASON FOR VISIT
[Symptom and Test Evaluation] : symptom and test evaluation [Hypertension] : hypertension [Other: ____] : [unfilled] [Family Member] : family member

## 2022-11-22 NOTE — DISCUSSION/SUMMARY
[EKG obtained to assist in diagnosis and management of assessed problem(s)] : EKG obtained to assist in diagnosis and management of assessed problem(s) [FreeTextEntry1] : Clinically the patient is doing well.  She has no chest pain, shortness of breath, palpitation.  Her blood pressure has been better, and she is not significantly orthostatic today. Her HR is in the 80 range.\par \par She will remain on her low-dose of torsemide, and we will monitor for edema.  She did have a mild LVOT gradient/chordal Barak on an echocardiogram last year, confirmed more recently.  She will take her Toprol XL 50, which was increased at last visit. \par \par She will continue to stay hydrated, and try to be active.  She will be careful when changing position quickly, and avoid falls at all costs.  If no issues, I will see her again in 2 months.

## 2022-11-22 NOTE — HISTORY OF PRESENT ILLNESS
[FreeTextEntry1] : I saw Meg Flanagan in the office today for cardiac follow-up.  \par \par She was seen in the emergency room 11/21 twice for sinus tachycardia and weakness.  It was thought that she may be dehydrated.  Lab work and ECG were unremarkable.  CTA of the chest was negative for PE or any acute pulmonary process.  proBNP was normal.  She was treated with hydration and started on low-dose beta-blocker.  She had an echocardiogram in our office  11/21 that demonstrated ejection fraction of 66%.  There was mild to moderate MR and MS.  There was mild AI without any significant aortic stenosis.  There was mild to moderate TR with a PA pressure of 40.  The left ventricular outflow tract gradient of 49 mmHg with chordal S.A.M.  Stage I diastolic dysfunction.  She had a carotid Doppler 12/21 that showed mild plaque. ZOEY was normal.\par alan Has a history of lung CA treated with left lower lobe lobectomy, and a history of breast cancer.  No known history of heart disease.  Does have history of hypertension.  Eyes any history of smoking, or family history of heart disease.\par \par I last saw her in 9/22.\par In may, 2022 the patient had an accidental fall and broke her right hip.  Still requiring pinning but not replacement.  She was in rehab and is now walking with a walker.\par \par Today, she is feeling quite well.  She has mild dizziness with positional change, though has not passed out.  She has no chest pain or palpitations.  She has not noted any fast heart rates or elevated blood pressure. At last visit, I increased her Toprol. \par \par I repeated her echocardiogram in 10/2022 which demonstrated moderate MR and moderate MS, with a mean gradient of 5, mild AS, moderate AI, normal left ventricular systolic function, a mild LVOT obstruction and mild diastolic dysfunction.

## 2022-11-22 NOTE — CARDIOLOGY SUMMARY
[de-identified] : 11/21- RST, nonspecific repolarization [de-identified] : 11/21- EF 66^, mild-mod MR/MS, PG 19, mild A!, mild-mod TR, PAP=40, LVOT 49, Choddal CORNEL, I DD [de-identified] : Carotid Doppler 12/21- mild plaque\par ZOEY - Normal

## 2022-12-12 LAB
25(OH)D3 SERPL-MCNC: 163 NG/ML
ALBUMIN SERPL ELPH-MCNC: 5 G/DL
ALP BLD-CCNC: 96 U/L
ALT SERPL-CCNC: 41 U/L
ANION GAP SERPL CALC-SCNC: 16 MMOL/L
APPEARANCE: ABNORMAL
AST SERPL-CCNC: 32 U/L
BACTERIA: NEGATIVE
BASOPHILS # BLD AUTO: 0.06 K/UL
BASOPHILS NFR BLD AUTO: 0.6 %
BILIRUB SERPL-MCNC: 0.4 MG/DL
BILIRUBIN URINE: NEGATIVE
BLOOD URINE: NEGATIVE
BUN SERPL-MCNC: 23 MG/DL
CALCIUM SERPL-MCNC: 10.4 MG/DL
CHLORIDE SERPL-SCNC: 99 MMOL/L
CHOLEST SERPL-MCNC: 170 MG/DL
CO2 SERPL-SCNC: 26 MMOL/L
COLOR: NORMAL
CREAT SERPL-MCNC: 0.82 MG/DL
EGFR: 69 ML/MIN/1.73M2
EOSINOPHIL # BLD AUTO: 0.21 K/UL
EOSINOPHIL NFR BLD AUTO: 2.1 %
ESTIMATED AVERAGE GLUCOSE: 111 MG/DL
FOLATE SERPL-MCNC: >20 NG/ML
GLUCOSE QUALITATIVE U: NEGATIVE
GLUCOSE SERPL-MCNC: 53 MG/DL
HBA1C MFR BLD HPLC: 5.5 %
HCT VFR BLD CALC: 46.6 %
HDLC SERPL-MCNC: 75 MG/DL
HGB BLD-MCNC: 14.8 G/DL
HYALINE CASTS: 1 /LPF
IMM GRANULOCYTES NFR BLD AUTO: 0.7 %
KETONES URINE: NEGATIVE
LDLC SERPL CALC-MCNC: 75 MG/DL
LEUKOCYTE ESTERASE URINE: NEGATIVE
LYMPHOCYTES # BLD AUTO: 3.11 K/UL
LYMPHOCYTES NFR BLD AUTO: 30.8 %
MAN DIFF?: NORMAL
MCHC RBC-ENTMCNC: 29.9 PG
MCHC RBC-ENTMCNC: 31.8 GM/DL
MCV RBC AUTO: 94.1 FL
MICROSCOPIC-UA: NORMAL
MONOCYTES # BLD AUTO: 0.91 K/UL
MONOCYTES NFR BLD AUTO: 9 %
NEUTROPHILS # BLD AUTO: 5.73 K/UL
NEUTROPHILS NFR BLD AUTO: 56.8 %
NITRITE URINE: NEGATIVE
NONHDLC SERPL-MCNC: 95 MG/DL
PH URINE: 8
PLATELET # BLD AUTO: 599 K/UL
POTASSIUM SERPL-SCNC: 5.4 MMOL/L
PROT SERPL-MCNC: 7 G/DL
PROTEIN URINE: NEGATIVE
RBC # BLD: 4.95 M/UL
RBC # FLD: 17.5 %
RED BLOOD CELLS URINE: 1 /HPF
SODIUM SERPL-SCNC: 141 MMOL/L
SPECIFIC GRAVITY URINE: 1.01
SQUAMOUS EPITHELIAL CELLS: 1 /HPF
TRIGL SERPL-MCNC: 102 MG/DL
TSH SERPL-ACNC: 2.79 UIU/ML
UROBILINOGEN URINE: NORMAL
VIT B12 SERPL-MCNC: 297 PG/ML
WBC # FLD AUTO: 10.09 K/UL
WHITE BLOOD CELLS URINE: 0 /HPF

## 2022-12-15 ENCOUNTER — APPOINTMENT (OUTPATIENT)
Dept: INTERNAL MEDICINE | Facility: CLINIC | Age: 87
End: 2022-12-15

## 2022-12-15 VITALS
DIASTOLIC BLOOD PRESSURE: 78 MMHG | RESPIRATION RATE: 14 BRPM | TEMPERATURE: 97.2 F | SYSTOLIC BLOOD PRESSURE: 122 MMHG | HEIGHT: 59 IN | HEART RATE: 76 BPM | BODY MASS INDEX: 26.61 KG/M2 | WEIGHT: 132 LBS | OXYGEN SATURATION: 97 %

## 2022-12-15 DIAGNOSIS — K21.9 GASTRO-ESOPHAGEAL REFLUX DISEASE W/OUT ESOPHAGITIS: ICD-10-CM

## 2022-12-15 PROCEDURE — 99214 OFFICE O/P EST MOD 30 MIN: CPT

## 2022-12-15 NOTE — ASSESSMENT
[FreeTextEntry1] : HTN and valvular heart - follow up with cards- check echo\par MGUS- follow up with hematology ( 2 months)\par Osteoporosis- follow up with end\par Asthma/ COPD- follow  up pulmonary

## 2022-12-15 NOTE — HISTORY OF PRESENT ILLNESS
[de-identified] : Pt GERD symptoms are doing well.\par Pt with osteoporosis- did not see endo yet. \par Pt with history of MGUS followed by hematology\par

## 2023-01-04 ENCOUNTER — RX RENEWAL (OUTPATIENT)
Age: 88
End: 2023-01-04

## 2023-01-15 ENCOUNTER — RX RENEWAL (OUTPATIENT)
Age: 88
End: 2023-01-15

## 2023-01-30 ENCOUNTER — APPOINTMENT (OUTPATIENT)
Dept: UROGYNECOLOGY | Facility: CLINIC | Age: 88
End: 2023-01-30
Payer: MEDICARE

## 2023-01-30 PROCEDURE — 99213 OFFICE O/P EST LOW 20 MIN: CPT

## 2023-01-31 NOTE — HISTORY OF PRESENT ILLNESS
[FreeTextEntry1] : Meg, 88 y/o presents to the office for follow up for her pelvic prolapse. Prolapse is being supported with a GH LS #3.  PT is doing well with it and happy with support.  She denies any vaginal bleeding, pelvic pain or discomfort. Denies any issues with urination or moving her bowels.  Pt also using Estradiol cream as Rx.\par \par \par

## 2023-01-31 NOTE — PROCEDURE
[Good Fit] : fits well [Discharge] : there is vaginal discharge [Pessary Inserted] : inserted [Pessary Washed] : washed [None] : no bleeding [Medication Review] : Medicaiton Review: Patient verbalizes understanding of risks and benefits [Bowel Management] : Bowel Management: patient verbalizes understanding of daily dietary fiber intake [Refit] : refit is not needed [Erosion] : no evidence of erosion [Erythema] : no erythema [Infection] : no evidence of infection [FreeTextEntry1] : Rohit WYATT #  3 [de-identified] : Erythema on posterior vaginal walls [de-identified] : Scant yellow discharge [FreeTextEntry3] : Continue estrogen use, Replens [FreeTextEntry4] : Advised avoid constipation/straining w/ daily fiber/fluid intake and stool softeners daily PRN [FreeTextEntry8] : Continue daily pericare.

## 2023-01-31 NOTE — DISCUSSION/SUMMARY
[FreeTextEntry1] : Pelvic prolapse:\par -Continue with Gellhorn LS # 3.\par -Precautions and instructions were reviewed.\par \par Vaginal atrophy:\par -Continue Estrace cream and Replens\par \par Follow up in 3 months or sooner if needed. \par Advised pt to call office with any questions or concerns, pt verbalized understanding.\par

## 2023-01-31 NOTE — PHYSICAL EXAM
[No Acute Distress] : in no acute distress [Well developed] : well developed [Well Nourished] : ~L well nourished [Good Hygeine] : demonstrates good hygeine [Oriented x3] : oriented to person, place, and time [Normal Memory] : ~T memory was ~L unimpaired [Normal Mood/Affect] : mood and affect are normal [Warm and Dry] : was warm and dry to touch [No Lesions] : no lesions were seen on the external genitalia [Vulvar Atrophy] : vulvar atrophy [Labia Majora] : were normal [Labia Minora] : were normal [Normal] : was normal [Atrophy] : atrophy [Erythematous] : erythema [Cystocele] : a cystocele [Discharge] : a  ~M vaginal discharge was present [Scant] : scant [Ananth] : yellow [Thin] : thin [No Bleeding] : there was no active vaginal bleeding [Soft] :  the cervix was soft [Anxiety] : patient is not anxious [Tenderness] : ~T no ~M abdominal tenderness observed [Distended] : not distended [de-identified] : Gait steady with walker

## 2023-02-01 ENCOUNTER — NON-APPOINTMENT (OUTPATIENT)
Age: 88
End: 2023-02-01

## 2023-02-15 ENCOUNTER — APPOINTMENT (OUTPATIENT)
Dept: PULMONOLOGY | Facility: CLINIC | Age: 88
End: 2023-02-15
Payer: MEDICARE

## 2023-02-15 VITALS
BODY MASS INDEX: 27.82 KG/M2 | HEART RATE: 105 BPM | WEIGHT: 138 LBS | DIASTOLIC BLOOD PRESSURE: 84 MMHG | SYSTOLIC BLOOD PRESSURE: 142 MMHG | OXYGEN SATURATION: 95 % | HEIGHT: 59 IN

## 2023-02-15 PROCEDURE — ZZZZZ: CPT

## 2023-02-15 PROCEDURE — 94010 BREATHING CAPACITY TEST: CPT

## 2023-02-15 PROCEDURE — 95012 NITRIC OXIDE EXP GAS DETER: CPT

## 2023-02-15 PROCEDURE — 99213 OFFICE O/P EST LOW 20 MIN: CPT | Mod: 25

## 2023-02-15 NOTE — ASSESSMENT
[FreeTextEntry1] : Overall appears stable.  Continue current inhalers.  Interval follow-up chest CT July 2023

## 2023-02-15 NOTE — HISTORY OF PRESENT ILLNESS
[Never] : never [TextBox_4] : Follow-up for asthma chronic cough.\par \par Notes some degree of shortness of breath\par \par Denies chest pain or palpitations

## 2023-02-28 ENCOUNTER — NON-APPOINTMENT (OUTPATIENT)
Age: 88
End: 2023-02-28

## 2023-02-28 ENCOUNTER — APPOINTMENT (OUTPATIENT)
Dept: CARDIOLOGY | Facility: CLINIC | Age: 88
End: 2023-02-28
Payer: MEDICARE

## 2023-02-28 VITALS
BODY MASS INDEX: 27.62 KG/M2 | HEART RATE: 87 BPM | OXYGEN SATURATION: 97 % | SYSTOLIC BLOOD PRESSURE: 144 MMHG | HEIGHT: 59 IN | WEIGHT: 137 LBS | DIASTOLIC BLOOD PRESSURE: 85 MMHG

## 2023-02-28 PROCEDURE — 93000 ELECTROCARDIOGRAM COMPLETE: CPT

## 2023-02-28 PROCEDURE — 99214 OFFICE O/P EST MOD 30 MIN: CPT

## 2023-02-28 NOTE — DISCUSSION/SUMMARY
[FreeTextEntry1] : Clinically the patient is doing well, other than some fatigue and dyspnea. She has no chest pain or palpitation.  Her blood pressure has been better overall. Her HR is in the 80 range.\par \par She will remain on her low-dose of torsemide, and we will monitor for edema.  She did have a mild LVOT gradient/chordal Barak on an echocardiogram last year, confirmed in 2022.  She will take her Toprol XL 50, and I am going to repeat her echocardiogram to evaluate her AV and MV.\par \par She will continue to stay hydrated, and try to be active.  She will be careful when changing position quickly, and avoid falls at all costs.  If no issues, I will see her again in 3 months. [EKG obtained to assist in diagnosis and management of assessed problem(s)] : EKG obtained to assist in diagnosis and management of assessed problem(s)

## 2023-02-28 NOTE — HISTORY OF PRESENT ILLNESS
[FreeTextEntry1] : I saw Meg Flanagan in the office today for cardiac follow-up.  \par \par She was seen in the emergency room 11/21 twice for sinus tachycardia and weakness.  It was thought that she may be dehydrated.  Lab work and ECG were unremarkable.  CTA of the chest was negative for PE or any acute pulmonary process.  proBNP was normal.  She was treated with hydration and started on low-dose beta-blocker.  She had an echocardiogram in our office  11/21 that demonstrated ejection fraction of 66%.  There was mild to moderate MR and MS.  There was mild AI without any significant aortic stenosis.  There was mild to moderate TR with a PA pressure of 40.  The left ventricular outflow tract gradient of 49 mmHg with chordal S.A.M.  Stage I diastolic dysfunction.  She had a carotid Doppler 12/21 that showed mild plaque. ZOEY was normal.\par alan Has a history of lung CA treated with left lower lobe lobectomy, and a history of breast cancer.  No known history of heart disease.  Does have history of hypertension.  Eyes any history of smoking, or family history of heart disease.\par \par I last saw her in 11/22.\par In may, 2022 the patient had an accidental fall and broke her right hip.  Still requiring pinning but not replacement.  She was in rehab and is now walking with a walker.\par \par Today, she is feeling quite well.  She has mild dizziness with positional change, though this has improved.  She has no chest pain or palpitations.  She has not noted any fast heart rates or elevated blood pressure. \par She does feel tired and had some dyspnea on exertion.\par \par I repeated her echocardiogram in 10/2022 which demonstrated moderate MR and moderate MS, with a mean gradient of 5, mild AS, moderate AI, normal left ventricular systolic function, a mild LVOT obstruction and mild diastolic dysfunction.

## 2023-02-28 NOTE — PHYSICAL EXAM
[Well Developed] : well developed [Well Nourished] : well nourished [No Acute Distress] : no acute distress [Normal Conjunctiva] : normal conjunctiva [Normal Venous Pressure] : normal venous pressure [No Carotid Bruit] : no carotid bruit [Normal S1, S2] : normal S1, S2 [No Murmur] : no murmur [No Rub] : no rub [No Gallop] : no gallop [Not Palpable] : not palpable [Normal Rate] : normal [Normal S1] : normal S1 [Normal S2] : normal S2 [S3] : no S3 [S4] : no S4 [II] : a grade 2 [___ +] : bilateral [unfilled]U+ pretibial pitting edema [Right Carotid Bruit] : no bruit heard over the right carotid [Left Carotid Bruit] : no bruit heard over the left carotid [Right Femoral Bruit] : no bruit heard over the right femoral artery [Left Femoral Bruit] : no bruit heard over the left femoral artery [2+] : left 2+ [No Abnormalities] : the abdominal aorta was not enlarged and no bruit was heard [Clear Lung Fields] : clear lung fields [Good Air Entry] : good air entry [No Respiratory Distress] : no respiratory distress  [Soft] : abdomen soft [Non Tender] : non-tender [No Masses/organomegaly] : no masses/organomegaly [Normal Bowel Sounds] : normal bowel sounds [Normal Gait] : normal gait [No Edema] : no edema [No Cyanosis] : no cyanosis [No Clubbing] : no clubbing [No Varicosities] : no varicosities [No Rash] : no rash [No Skin Lesions] : no skin lesions [Moves all extremities] : moves all extremities [No Focal Deficits] : no focal deficits [Normal Speech] : normal speech [Normal] : alert and oriented, normal memory [Alert and Oriented] : alert and oriented [Normal memory] : normal memory

## 2023-02-28 NOTE — CARDIOLOGY SUMMARY
[de-identified] : 11/21- RST, nonspecific repolarization [de-identified] : 11/21- EF 66^, mild-mod MR/MS, PG 19, mild A!, mild-mod TR, PAP=40, LVOT 49, Choddal CORNEL, I DD [de-identified] : Carotid Doppler 12/21- mild plaque\par ZOEY - Normal

## 2023-02-28 NOTE — REVIEW OF SYSTEMS
[Fever] : no fever [Headache] : no headache [Weight Gain (___ Lbs)] : no recent weight gain [Chills] : no chills [Feeling Fatigued] : feeling fatigued [Weight Loss (___ Lbs)] : no recent weight loss [Blurry Vision] : no blurred vision [Seeing Double (Diplopia)] : no diplopia [Lower Ext Edema] : lower extremity edema [Cough] : cough [Wheezing] : no wheezing [Heartburn] : heartburn [Joint Pain] : joint pain [Hip Pain] : hip pain [Rash] : no rash [Dizziness] : dizziness [Depression] : no depression [Anxiety] : no anxiety [Easy Bleeding] : no tendency for easy bleeding [Easy Bruising] : a tendency for easy bruising [Negative] : Genitourinary

## 2023-03-07 ENCOUNTER — APPOINTMENT (OUTPATIENT)
Dept: CARDIOLOGY | Facility: CLINIC | Age: 88
End: 2023-03-07
Payer: MEDICARE

## 2023-03-07 PROCEDURE — 93306 TTE W/DOPPLER COMPLETE: CPT

## 2023-03-17 ENCOUNTER — APPOINTMENT (OUTPATIENT)
Dept: ENDOCRINOLOGY | Facility: CLINIC | Age: 88
End: 2023-03-17
Payer: MEDICARE

## 2023-03-17 VITALS
BODY MASS INDEX: 27.85 KG/M2 | WEIGHT: 140 LBS | HEART RATE: 82 BPM | DIASTOLIC BLOOD PRESSURE: 84 MMHG | HEIGHT: 59.5 IN | OXYGEN SATURATION: 97 % | SYSTOLIC BLOOD PRESSURE: 132 MMHG

## 2023-03-17 DIAGNOSIS — M81.0 AGE-RELATED OSTEOPOROSIS W/OUT CURRENT PATHOLOGICAL FRACTURE: ICD-10-CM

## 2023-03-17 PROCEDURE — 99205 OFFICE O/P NEW HI 60 MIN: CPT

## 2023-03-19 NOTE — ASSESSMENT
[FreeTextEntry1] : 87 year old female with past medical history of Breast Cancer s/p b/l mastectomy, Osteoporosis, HLD,  who presents for management of her osteoporosis\par \par 1. Osteoporosis\par Patient warrants treatment for her OP given that she is at high risk for fractures. \par Diet, weight bearing and muscle strengthening exercise and fall prevention were discussed. Smoking cessation and alcohol consumption (no more then an average 2 drinks/day) was discussed. \par I counseled the patient regarding calcium and vitamin D intake. Calcium 1200 mg daily from diet and supplements (to be taken in divided doses as no more than 500-600 mg can be absorbed at one time)\par s/p Reclast\par Will repeat DEXA 8/2023\par \par

## 2023-03-19 NOTE — PHYSICAL EXAM
[Alert] : alert [Well Nourished] : well nourished [No Acute Distress] : no acute distress [Normal Sclera/Conjunctiva] : normal sclera/conjunctiva [Well Developed] : well developed [EOMI] : extra ocular movement intact [No Proptosis] : no proptosis [Normal Oropharynx] : the oropharynx was normal [Thyroid Not Enlarged] : the thyroid was not enlarged [No Thyroid Nodules] : no palpable thyroid nodules [No Respiratory Distress] : no respiratory distress [No Accessory Muscle Use] : no accessory muscle use [Clear to Auscultation] : lungs were clear to auscultation bilaterally [Normal S1, S2] : normal S1 and S2 [Regular Rhythm] : with a regular rhythm [Normal Rate] : heart rate was normal [No Edema] : no peripheral edema [Pedal Pulses Normal] : the pedal pulses are present [Normal Bowel Sounds] : normal bowel sounds [Not Tender] : non-tender [Soft] : abdomen soft [Not Distended] : not distended [Normal Anterior Cervical Nodes] : no anterior cervical lymphadenopathy [Normal Posterior Cervical Nodes] : no posterior cervical lymphadenopathy [No Spinal Tenderness] : no spinal tenderness [Spine Straight] : spine straight [No Stigmata of Cushings Syndrome] : no stigmata of Cushings Syndrome [Normal Gait] : normal gait [Normal Strength/Tone] : muscle strength and tone were normal [No Rash] : no rash [Acanthosis Nigricans] : no acanthosis nigricans [Normal Reflexes] : deep tendon reflexes were 2+ and symmetric [No Tremors] : no tremors [Oriented x3] : oriented to person, place, and time

## 2023-03-19 NOTE — HISTORY OF PRESENT ILLNESS
[FreeTextEntry1] : Ms. STEFANO COBOS  is a 87 year old female with past medical history of Breast Cancer s/p b/l mastectomy, Osteoporosis, HLD,  who presents for management of her osteoporosis.\par \par Bone History:\par Menopause: Last menstrual period age 54\par Osteoporosis diagnosed in 8/2022\par Fracture history: R femur fracture (5/2022), right wrist\par Family history: No parental history of osteoporosis\par Treatment: Reclast (Aug 2022)\par \par Falls: Yes\par Height loss: Yes\par Kidney stones: No\par Dental health: Regular appointments, Has implants, no upcoming procedures planned\par Exercise: none\par Dairy intake: none\par Calcium supplements: Citracal\par Multivitamin: None\par Vitamin D supplements: Vitamin D3 (stopped for high levels)\par \par Osteoporosis risk factors include: Postmenopausal status,  race, prior fracture, falls, height loss, small thin bones\par

## 2023-04-08 ENCOUNTER — RX RENEWAL (OUTPATIENT)
Age: 88
End: 2023-04-08

## 2023-05-08 ENCOUNTER — APPOINTMENT (OUTPATIENT)
Dept: UROGYNECOLOGY | Facility: CLINIC | Age: 88
End: 2023-05-08
Payer: MEDICARE

## 2023-05-08 DIAGNOSIS — Z87.898 PERSONAL HISTORY OF OTHER SPECIFIED CONDITIONS: ICD-10-CM

## 2023-05-08 DIAGNOSIS — R30.0 DYSURIA: ICD-10-CM

## 2023-05-08 LAB
BILIRUB UR QL STRIP: NEGATIVE
CLARITY UR: NORMAL
COLLECTION METHOD: NORMAL
GLUCOSE UR-MCNC: NEGATIVE
HCG UR QL: 0.2 EU/DL
HGB UR QL STRIP.AUTO: NORMAL
KETONES UR-MCNC: NEGATIVE
LEUKOCYTE ESTERASE UR QL STRIP: NORMAL
NITRITE UR QL STRIP: NEGATIVE
PH UR STRIP: 6.5
PROT UR STRIP-MCNC: NEGATIVE
SP GR UR STRIP: 1.01

## 2023-05-08 PROCEDURE — 51701 INSERT BLADDER CATHETER: CPT

## 2023-05-08 PROCEDURE — 99214 OFFICE O/P EST MOD 30 MIN: CPT | Mod: 25

## 2023-05-08 PROCEDURE — 81003 URINALYSIS AUTO W/O SCOPE: CPT | Mod: QW

## 2023-05-08 NOTE — DISCUSSION/SUMMARY
[FreeTextEntry1] : Acute UTI:\par Udip shows trace blood and small leukocytes\par Formal UA, CS sent. Tx started with Nitrofurantoin x 5 days.\par ADvised pt to call the office if symptoms should persist or worsen\par \par Pelvic prolapse:\par -Continue with Gellhorn LS # 3.\par -Precautions and instructions were reviewed.\par \par Vaginal atrophy:\par -Continue Estrace cream and Replens\par \par Follow up in 3 months or sooner if needed. \par Advised pt to call office with any questions or concerns, pt verbalized understanding.\par

## 2023-05-08 NOTE — PHYSICAL EXAM
[No Acute Distress] : in no acute distress [Well developed] : well developed [Well Nourished] : ~L well nourished [Good Hygeine] : demonstrates good hygeine [Oriented x3] : oriented to person, place, and time [Normal Memory] : ~T memory was ~L unimpaired [Normal Mood/Affect] : mood and affect are normal [Anxiety] : patient is not anxious [Tenderness] : ~T no ~M abdominal tenderness observed [Distended] : not distended [Warm and Dry] : was warm and dry to touch [Vulvar Atrophy] : vulvar atrophy [Labia Majora] : were normal [Labia Minora] : were normal [Normal] : was normal [Atrophy] : atrophy [Erythematous] : erythema [Cystocele] : a cystocele [Discharge] : a  ~M vaginal discharge was present [Scant] : scant [Ananth] : yellow [Thin] : thin [No Bleeding] : there was no active vaginal bleeding [Soft] :  the cervix was soft [Post Void Residual ____ml] : post void residual was [unfilled] ml [de-identified] : Gait steady with walker

## 2023-05-08 NOTE — PHYSICAL EXAM
[No Acute Distress] : in no acute distress [Well developed] : well developed [Well Nourished] : ~L well nourished [Good Hygeine] : demonstrates good hygeine [Oriented x3] : oriented to person, place, and time [Normal Memory] : ~T memory was ~L unimpaired [Normal Mood/Affect] : mood and affect are normal [Anxiety] : patient is not anxious [Tenderness] : ~T no ~M abdominal tenderness observed [Distended] : not distended [Warm and Dry] : was warm and dry to touch [Vulvar Atrophy] : vulvar atrophy [Labia Majora] : were normal [Labia Minora] : were normal [Normal] : was normal [Atrophy] : atrophy [Erythematous] : erythema [Cystocele] : a cystocele [Discharge] : a  ~M vaginal discharge was present [Scant] : scant [Ananth] : yellow [Thin] : thin [No Bleeding] : there was no active vaginal bleeding [Soft] :  the cervix was soft [Post Void Residual ____ml] : post void residual was [unfilled] ml [de-identified] : Gait steady with walker

## 2023-05-08 NOTE — HISTORY OF PRESENT ILLNESS
[FreeTextEntry1] : Meg, 86 y/o presents to the office for follow up for her pelvic prolapse. Prolapse is being supported with a GH LS #3.  PT is doing well with it and happy with support.  She denies any vaginal bleeding, pelvic pain or discomfort. Denies any issues with  moving her bowels.  Pt also using Estradiol cream as Rx.\par Today she reports dysuria x 3 days. She denies any fever, chills or back pain.\par \par \par

## 2023-05-08 NOTE — HISTORY OF PRESENT ILLNESS
[FreeTextEntry1] : Meg, 88 y/o presents to the office for follow up for her pelvic prolapse. Prolapse is being supported with a GH LS #3.  PT is doing well with it and happy with support.  She denies any vaginal bleeding, pelvic pain or discomfort. Denies any issues with  moving her bowels.  Pt also using Estradiol cream as Rx.\par Today she reports dysuria x 3 days. She denies any fever, chills or back pain.\par \par \par

## 2023-05-08 NOTE — PROCEDURE
[Straight Catheterization] : insertion of a straight catheter [Urinary Tract Infection] : a urinary tract infection [Patient] : the patient [Allergies Reviewed] : Allergies reviewed [___ Fr Straight Tip] : a [unfilled] in [place] straight tip catheter [Cloudy] : cloudy [Culture] : culture [Urinalysis] : urinalysis [No Complications] : no complications [Tolerated Well] : the patient tolerated the procedure well [Post procedure instructions and information given] : Post procedure instructions and information were given and reviewed with patient. [Good Fit] : fits well [Refit] : refit is not needed [Erosion] : no evidence of erosion [Erythema] : no erythema [Discharge] : there is vaginal discharge [Infection] : no evidence of infection [Pessary Inserted] : inserted [Pessary Washed] : washed [None] : no bleeding [Medication Review] : Medicaiton Review: Patient verbalizes understanding of risks and benefits [Bowel Management] : Bowel Management: patient verbalizes understanding of daily dietary fiber intake [FreeTextEntry9] : Urine obtained via straight cath due to vaginal atrophy [FreeTextEntry1] : Rohit WYATT #  3 [de-identified] : Erythema on posterior vaginal walls [de-identified] : Scant yellow discharge [FreeTextEntry3] : Continue estrogen use, Replens [FreeTextEntry4] : Advised avoid constipation/straining w/ daily fiber/fluid intake and stool softeners daily PRN [FreeTextEntry8] : Continue daily pericare.

## 2023-05-08 NOTE — PROCEDURE
[Straight Catheterization] : insertion of a straight catheter [Urinary Tract Infection] : a urinary tract infection [Patient] : the patient [Allergies Reviewed] : Allergies reviewed [___ Fr Straight Tip] : a [unfilled] in [place] straight tip catheter [Cloudy] : cloudy [Culture] : culture [Urinalysis] : urinalysis [No Complications] : no complications [Tolerated Well] : the patient tolerated the procedure well [Post procedure instructions and information given] : Post procedure instructions and information were given and reviewed with patient. [Good Fit] : fits well [Refit] : refit is not needed [Erosion] : no evidence of erosion [Erythema] : no erythema [Discharge] : there is vaginal discharge [Infection] : no evidence of infection [Pessary Inserted] : inserted [Pessary Washed] : washed [None] : no bleeding [Medication Review] : Medicaiton Review: Patient verbalizes understanding of risks and benefits [Bowel Management] : Bowel Management: patient verbalizes understanding of daily dietary fiber intake [FreeTextEntry9] : Urine obtained via straight cath due to vaginal atrophy [FreeTextEntry1] : Rohit WYATT #  3 [de-identified] : Erythema on posterior vaginal walls [de-identified] : Scant yellow discharge [FreeTextEntry3] : Continue estrogen use, Replens [FreeTextEntry4] : Advised avoid constipation/straining w/ daily fiber/fluid intake and stool softeners daily PRN [FreeTextEntry8] : Continue daily pericare.

## 2023-05-12 LAB
APPEARANCE: CLEAR
BACTERIA: NEGATIVE /HPF
BILIRUBIN URINE: NEGATIVE
BLOOD URINE: NEGATIVE
CAST: 3 /LPF
COLOR: YELLOW
EPITHELIAL CELLS: 1 /HPF
GLUCOSE QUALITATIVE U: NEGATIVE MG/DL
KETONES URINE: NEGATIVE MG/DL
LEUKOCYTE ESTERASE URINE: ABNORMAL
MICROSCOPIC-UA: NORMAL
NITRITE URINE: NEGATIVE
PH URINE: 6.5
PROTEIN URINE: NEGATIVE MG/DL
RED BLOOD CELLS URINE: 0 /HPF
REVIEW: NORMAL
SPECIFIC GRAVITY URINE: 1.01
UROBILINOGEN URINE: 0.2 MG/DL
WHITE BLOOD CELLS URINE: 27 /HPF

## 2023-05-17 ENCOUNTER — APPOINTMENT (OUTPATIENT)
Dept: PULMONOLOGY | Facility: CLINIC | Age: 88
End: 2023-05-17
Payer: MEDICARE

## 2023-05-17 VITALS
RESPIRATION RATE: 16 BRPM | DIASTOLIC BLOOD PRESSURE: 76 MMHG | HEIGHT: 59.5 IN | SYSTOLIC BLOOD PRESSURE: 114 MMHG | WEIGHT: 138 LBS | BODY MASS INDEX: 27.45 KG/M2 | OXYGEN SATURATION: 94 % | HEART RATE: 68 BPM

## 2023-05-17 DIAGNOSIS — K22.70 BARRETT'S ESOPHAGUS W/OUT DYSPLASIA: ICD-10-CM

## 2023-05-17 PROCEDURE — 99213 OFFICE O/P EST LOW 20 MIN: CPT | Mod: 25

## 2023-05-17 PROCEDURE — 95012 NITRIC OXIDE EXP GAS DETER: CPT

## 2023-05-17 RX ORDER — NITROFURANTOIN (MONOHYDRATE/MACROCRYSTALS) 25; 75 MG/1; MG/1
100 CAPSULE ORAL TWICE DAILY
Qty: 10 | Refills: 0 | Status: DISCONTINUED | COMMUNITY
Start: 2023-05-08 | End: 2023-05-17

## 2023-05-17 NOTE — ASSESSMENT
[FreeTextEntry1] : Clinically pulmonary status is better probably would benefit from higher dose of inhaled steroid.  Unwilling to take a higher dose of Qvar asked her to check if [place] is covered option.

## 2023-05-17 NOTE — HISTORY OF PRESENT ILLNESS
[TextBox_4] : Aloe up for chronic cough and asthma history of pulmonary nodules history of lung cancer scheduled for follow-up imaging via thoracic surgery.  Had episode of worsening cough and reflux after being prescribed nitrofurantoin now back to baseline currently on Qvar 3 puffs twice daily.  Cannot take any higher dose because of cost considerations

## 2023-05-21 ENCOUNTER — RX RENEWAL (OUTPATIENT)
Age: 88
End: 2023-05-21

## 2023-06-02 ENCOUNTER — NON-APPOINTMENT (OUTPATIENT)
Age: 88
End: 2023-06-02

## 2023-06-02 ENCOUNTER — APPOINTMENT (OUTPATIENT)
Dept: CARDIOLOGY | Facility: CLINIC | Age: 88
End: 2023-06-02
Payer: MEDICARE

## 2023-06-02 VITALS
BODY MASS INDEX: 28.63 KG/M2 | SYSTOLIC BLOOD PRESSURE: 113 MMHG | HEIGHT: 59 IN | HEART RATE: 91 BPM | DIASTOLIC BLOOD PRESSURE: 74 MMHG | OXYGEN SATURATION: 94 % | WEIGHT: 142 LBS

## 2023-06-02 DIAGNOSIS — I38 ENDOCARDITIS, VALVE UNSPECIFIED: ICD-10-CM

## 2023-06-02 DIAGNOSIS — R06.00 DYSPNEA, UNSPECIFIED: ICD-10-CM

## 2023-06-02 PROCEDURE — 93000 ELECTROCARDIOGRAM COMPLETE: CPT

## 2023-06-02 PROCEDURE — 99214 OFFICE O/P EST MOD 30 MIN: CPT

## 2023-06-02 NOTE — PHYSICAL EXAM
[Well Developed] : well developed [Well Nourished] : well nourished [No Acute Distress] : no acute distress [Normal Conjunctiva] : normal conjunctiva [Normal Venous Pressure] : normal venous pressure [No Carotid Bruit] : no carotid bruit [Normal S1, S2] : normal S1, S2 [No Murmur] : no murmur [No Rub] : no rub [No Gallop] : no gallop [Not Palpable] : not palpable [Normal Rate] : normal [Normal S1] : normal S1 [Normal S2] : normal S2 [II] : a grade 2 [___ +] : bilateral [unfilled]U+ pretibial pitting edema [2+] : left 2+ [No Abnormalities] : the abdominal aorta was not enlarged and no bruit was heard [Clear Lung Fields] : clear lung fields [Good Air Entry] : good air entry [No Respiratory Distress] : no respiratory distress  [Soft] : abdomen soft [Non Tender] : non-tender [No Masses/organomegaly] : no masses/organomegaly [Normal Bowel Sounds] : normal bowel sounds [Normal Gait] : normal gait [No Cyanosis] : no cyanosis [No Clubbing] : no clubbing [No Varicosities] : no varicosities [No Rash] : no rash [No Skin Lesions] : no skin lesions [Moves all extremities] : moves all extremities [No Focal Deficits] : no focal deficits [Normal Speech] : normal speech [Normal] : alert and oriented, normal memory [Alert and Oriented] : alert and oriented [Normal memory] : normal memory [S3] : no S3 [S4] : no S4 [Right Carotid Bruit] : no bruit heard over the right carotid [Left Carotid Bruit] : no bruit heard over the left carotid [Right Femoral Bruit] : no bruit heard over the right femoral artery [Left Femoral Bruit] : no bruit heard over the left femoral artery [de-identified] : + left ankle edema

## 2023-06-02 NOTE — CARDIOLOGY SUMMARY
[de-identified] : 11/21- RST, nonspecific repolarization [de-identified] : 11/21- EF 66^, mild-mod MR/MS, PG 19, mild A!, mild-mod TR, PAP=40, LVOT 49, Choddal CORNEL, I DD [de-identified] : Carotid Doppler 12/21- mild plaque\par ZOEY - Normal

## 2023-06-02 NOTE — DISCUSSION/SUMMARY
[FreeTextEntry1] : Clinically the patient is doing well, other than some fatigue and chronic dyspnea. She has no chest pain or palpitation.  Her blood pressure has been better overall. Her HR is in the 80 range.\par \par She will remain on her low-dose of torsemide, and we will monitor for edema.  She did have a mild LVOT gradient/chordal Barak on an echocardiogram last year, confirmed in 2023.  I am increasing her toprol to 75 mg daily (she will take 50 qhs and 25 in the am).\par \par She will continue to stay hydrated, and try to be active.  She will be careful when changing position quickly, and avoid falls at all costs.  If no issues, I will see her again in 3-4 months. [EKG obtained to assist in diagnosis and management of assessed problem(s)] : EKG obtained to assist in diagnosis and management of assessed problem(s)

## 2023-06-02 NOTE — HISTORY OF PRESENT ILLNESS
[FreeTextEntry1] : I saw Meg Flanagan in the office today for cardiac follow-up.  \par \par She was seen in the emergency room 11/21 twice for sinus tachycardia and weakness.  It was thought that she may be dehydrated.  Lab work and ECG were unremarkable.  CTA of the chest was negative for PE or any acute pulmonary process.  proBNP was normal.  She was treated with hydration and started on low-dose beta-blocker.  She had an echocardiogram in our office  11/21 that demonstrated ejection fraction of 66%.  There was mild to moderate MR and MS.  There was mild AI without any significant aortic stenosis.  There was mild to moderate TR with a PA pressure of 40.  The left ventricular outflow tract gradient of 49 mmHg with chordal S.A.M.  Stage I diastolic dysfunction.  She had a carotid Doppler 12/21 that showed mild plaque. ZOEY was normal.\par \par Has a history of lung CA treated with left lower lobe lobectomy, and a history of breast cancer.  No known history of heart disease. \par \par I last saw her in 2/23\par In may, 2022 the patient had an accidental fall and broke her right hip, requiring pinning but not replacement.  She was in rehab and is now walking with a walker.\par \par Today, she is feeling quite well.  She has mild dizziness with positional change, though this has improved.  She has no chest pain or palpitations.  She has not noted any fast heart rates or elevated blood pressure. \par She does feel tired and had some dyspnea on exertion.\par She has very mild ankle edema.\par \par I repeated her echocardiogram in 10/2022 which demonstrated moderate MR and moderate MS, with a mean gradient of 5, mild AS, moderate AI, normal left ventricular systolic function, a mild LVOT obstruction and mild diastolic dysfunction.  A repeat study in March 2023 demonstrated similar findings, with mild MS, moderate AR, mild AAS, and moderate AR.  The LV function was normal with an EF of 64%.  There was again a mild LVOT obstruction noted.

## 2023-06-05 ENCOUNTER — APPOINTMENT (OUTPATIENT)
Dept: UROGYNECOLOGY | Facility: CLINIC | Age: 88
End: 2023-06-05
Payer: MEDICARE

## 2023-06-05 DIAGNOSIS — Z87.440 PERSONAL HISTORY OF URINARY (TRACT) INFECTIONS: ICD-10-CM

## 2023-06-05 LAB
BILIRUB UR QL STRIP: NORMAL
CLARITY UR: CLEAR
COLLECTION METHOD: NORMAL
GLUCOSE UR-MCNC: NORMAL
HCG UR QL: 0.2 EU/DL
HGB UR QL STRIP.AUTO: NORMAL
KETONES UR-MCNC: NORMAL
LEUKOCYTE ESTERASE UR QL STRIP: NORMAL
NITRITE UR QL STRIP: NORMAL
PH UR STRIP: 6.5
PROT UR STRIP-MCNC: NORMAL
SP GR UR STRIP: 1.01

## 2023-06-05 PROCEDURE — 99214 OFFICE O/P EST MOD 30 MIN: CPT | Mod: 25

## 2023-06-05 PROCEDURE — 51701 INSERT BLADDER CATHETER: CPT

## 2023-06-05 PROCEDURE — 81003 URINALYSIS AUTO W/O SCOPE: CPT | Mod: QW

## 2023-06-09 LAB
APPEARANCE: ABNORMAL
BACTERIA: NEGATIVE /HPF
BILIRUBIN URINE: NEGATIVE
BLOOD URINE: ABNORMAL
CAST: 1 /LPF
COLOR: YELLOW
EPITHELIAL CELLS: 0 /HPF
GLUCOSE QUALITATIVE U: NEGATIVE MG/DL
KETONES URINE: NEGATIVE MG/DL
LEUKOCYTE ESTERASE URINE: ABNORMAL
MICROSCOPIC-UA: NORMAL
NITRITE URINE: NEGATIVE
PH URINE: 7
PROTEIN URINE: NEGATIVE MG/DL
RED BLOOD CELLS URINE: 0 /HPF
SPECIFIC GRAVITY URINE: 1.01
UROBILINOGEN URINE: 0.2 MG/DL
WHITE BLOOD CELLS URINE: 238 /HPF

## 2023-06-15 ENCOUNTER — APPOINTMENT (OUTPATIENT)
Dept: INTERNAL MEDICINE | Facility: CLINIC | Age: 88
End: 2023-06-15
Payer: MEDICARE

## 2023-06-15 VITALS
DIASTOLIC BLOOD PRESSURE: 83 MMHG | HEIGHT: 59 IN | RESPIRATION RATE: 15 BRPM | OXYGEN SATURATION: 95 % | BODY MASS INDEX: 28.43 KG/M2 | TEMPERATURE: 98 F | SYSTOLIC BLOOD PRESSURE: 125 MMHG | HEART RATE: 104 BPM | WEIGHT: 141 LBS

## 2023-06-15 PROCEDURE — 99214 OFFICE O/P EST MOD 30 MIN: CPT

## 2023-06-15 RX ORDER — CEPHALEXIN 500 MG/1
500 CAPSULE ORAL TWICE DAILY
Qty: 14 | Refills: 0 | Status: DISCONTINUED | COMMUNITY
Start: 2023-06-06 | End: 2023-06-15

## 2023-06-15 RX ORDER — SULFAMETHOXAZOLE AND TRIMETHOPRIM 800; 160 MG/1; MG/1
800-160 TABLET ORAL
Qty: 6 | Refills: 0 | Status: DISCONTINUED | COMMUNITY
Start: 2023-06-05 | End: 2023-06-15

## 2023-06-15 NOTE — ASSESSMENT
[FreeTextEntry1] : Asthma- follow up with pulmonary  ALVesco bid\par GERD- Protonix 40mg daily\par HTN- continue metoprolol 50mg and 25 in pm and nifedipine 30mg and Torsemide 5mg daily\par MGUS- follow up with hem\par osteoporosis- follow up with endo\par Edema- recommend teds and continue torsemide 5mg daily

## 2023-06-15 NOTE — HISTORY OF PRESENT ILLNESS
[de-identified] : Pt here today for Follow up \par History of GERD- stable on Meds.\par History of MGUS- followed by hematology- every 3 month\par History of osteoporosis- SC for repeat 8/23

## 2023-06-15 NOTE — PHYSICAL EXAM
[Normal] : no joint swelling and grossly normal strength and tone [de-identified] : plus 1 edema pre tibial

## 2023-06-19 ENCOUNTER — RX RENEWAL (OUTPATIENT)
Age: 88
End: 2023-06-19

## 2023-06-20 ENCOUNTER — NON-APPOINTMENT (OUTPATIENT)
Age: 88
End: 2023-06-20

## 2023-06-20 ENCOUNTER — APPOINTMENT (OUTPATIENT)
Dept: CARDIOLOGY | Facility: CLINIC | Age: 88
End: 2023-06-20
Payer: MEDICARE

## 2023-06-20 VITALS
BODY MASS INDEX: 29.03 KG/M2 | OXYGEN SATURATION: 95 % | SYSTOLIC BLOOD PRESSURE: 120 MMHG | HEART RATE: 94 BPM | WEIGHT: 144 LBS | HEIGHT: 59 IN | DIASTOLIC BLOOD PRESSURE: 75 MMHG

## 2023-06-20 DIAGNOSIS — R60.0 LOCALIZED EDEMA: ICD-10-CM

## 2023-06-20 DIAGNOSIS — I35.1 NONRHEUMATIC AORTIC (VALVE) INSUFFICIENCY: ICD-10-CM

## 2023-06-20 DIAGNOSIS — I34.0 NONRHEUMATIC MITRAL (VALVE) INSUFFICIENCY: ICD-10-CM

## 2023-06-20 PROCEDURE — 93000 ELECTROCARDIOGRAM COMPLETE: CPT

## 2023-06-20 PROCEDURE — 99214 OFFICE O/P EST MOD 30 MIN: CPT

## 2023-06-20 NOTE — HISTORY OF PRESENT ILLNESS
[FreeTextEntry1] : I saw Meg Flanagan in the office today for cardiac follow-up.  She was last seen by Dr Redd on 6/2/23\par \par She was seen in the emergency room 11/21 twice for sinus tachycardia and weakness.  It was thought that she may be dehydrated.  Lab work and ECG were unremarkable.  CTA of the chest was negative for PE or any acute pulmonary process.  proBNP was normal.  She was treated with hydration and started on low-dose beta-blocker.  She had an echocardiogram in our office  11/21 that demonstrated ejection fraction of 66%.  There was mild to moderate MR and MS.  There was mild AI without any significant aortic stenosis.  There was mild to moderate TR with a PA pressure of 40.  The left ventricular outflow tract gradient of 49 mmHg with chordal S.A.M.  Stage I diastolic dysfunction.  She had a carotid Doppler 12/21 that showed mild plaque. ZOEY was normal.\par \par Has a history of lung CA treated with left lower lobe lobectomy, and a history of breast cancer.  No known history of heart disease. \par \par Dr Redd last saw her in 2/23\par In may, 2022 the patient had an accidental fall and broke her right hip, requiring pinning but not replacement.  She was in rehab and is now walking with a walker or a cane.\par \par Today, she is feeling quite well. At her last office visit,  she has mild dizziness with positional change, though this has improved.  She has no chest pain or palpitations.  She has not noted any fast heart rates or elevated blood pressures. \par She does feel tired and had some dyspnea on exertion.\par She has very mild ankle edema. Her blood pressure today was 120/75 with a heart rate of 94.\par \par I repeated her echocardiogram in 10/2022 which demonstrated moderate MR and moderate MS, with a mean gradient of 5, mild AS, moderate AI, normal left ventricular systolic function, a mild LVOT obstruction and mild diastolic dysfunction.  A repeat study in March 2023 demonstrated similar findings, with mild MS, moderate AR, mild AAS, and moderate AR.  The LV function was normal with an EF of 64%.  There was again a mild LVOT obstruction noted.

## 2023-06-20 NOTE — REVIEW OF SYSTEMS
[Feeling Fatigued] : feeling fatigued [Lower Ext Edema] : lower extremity edema [Heartburn] : heartburn [Joint Pain] : joint pain [Hip Pain] : hip pain [Easy Bruising] : a tendency for easy bruising [Fever] : no fever [Headache] : no headache [Weight Gain (___ Lbs)] : no recent weight gain [Chills] : no chills [Weight Loss (___ Lbs)] : no recent weight loss [Blurry Vision] : no blurred vision [Seeing Double (Diplopia)] : no diplopia [Cough] : no cough [Wheezing] : no wheezing [Rash] : no rash [Dizziness] : no dizziness [Depression] : no depression [Anxiety] : no anxiety [Easy Bleeding] : no tendency for easy bleeding [Negative] : Psychiatric

## 2023-06-20 NOTE — PHYSICAL EXAM
[Well Developed] : well developed [Well Nourished] : well nourished [No Acute Distress] : no acute distress [Normal Conjunctiva] : normal conjunctiva [Normal Venous Pressure] : normal venous pressure [No Carotid Bruit] : no carotid bruit [Normal S1, S2] : normal S1, S2 [No Murmur] : no murmur [No Rub] : no rub [No Gallop] : no gallop [Not Palpable] : not palpable [Normal Rate] : normal [Normal S1] : normal S1 [Normal S2] : normal S2 [II] : a grade 2 [___ +] : bilateral [unfilled]U+ pretibial pitting edema [2+] : left 2+ [No Abnormalities] : the abdominal aorta was not enlarged and no bruit was heard [Clear Lung Fields] : clear lung fields [Good Air Entry] : good air entry [No Respiratory Distress] : no respiratory distress  [Soft] : abdomen soft [Non Tender] : non-tender [No Masses/organomegaly] : no masses/organomegaly [Normal Bowel Sounds] : normal bowel sounds [Normal Gait] : normal gait [No Cyanosis] : no cyanosis [No Clubbing] : no clubbing [No Varicosities] : no varicosities [No Rash] : no rash [No Skin Lesions] : no skin lesions [Moves all extremities] : moves all extremities [No Focal Deficits] : no focal deficits [Normal Speech] : normal speech [Normal] : alert and oriented, normal memory [Alert and Oriented] : alert and oriented [Normal memory] : normal memory [S3] : no S3 [S4] : no S4 [Right Carotid Bruit] : no bruit heard over the right carotid [Left Carotid Bruit] : no bruit heard over the left carotid [Right Femoral Bruit] : no bruit heard over the right femoral artery [Left Femoral Bruit] : no bruit heard over the left femoral artery [de-identified] : + left ankle edema

## 2023-06-20 NOTE — CARDIOLOGY SUMMARY
[de-identified] : 11/21- RST, nonspecific repolarization\par 6/20/23 sinus @ a rate of 92 [de-identified] : 11/21- EF 66^, mild-mod MR/MS, PG 19, mild A!, mild-mod TR, PAP=40, LVOT 49, Choddal CORNEL, I DD [de-identified] : Carotid Doppler 12/21- mild plaque\par ZOEY - Normal

## 2023-06-20 NOTE — DISCUSSION/SUMMARY
[FreeTextEntry1] : Clinically the patient is doing well, other than some fatigue and chronic dyspnea. She has no chest pain or palpitation.  Her blood pressure has been better overall. Her HR is in the 80 - 90 range.She does need a cane or walker for ambulation.\par \par She will remain on her low-dose of torsemide, and we will monitor for edema.  She did have a mild LVOT gradient/chordal Barak on an echocardiogram last year, confirmed in 2023.  At her last office visit, her toprol was increased  to 75 mg daily (she will take 50 qhs and 25 in the am).\par \par She will continue to stay hydrated, and try to be active.  She will be careful when changing position quickly, and avoid falls at all costs.  If no issues, she will follow up with Dr Redd  again in 3-4 months.  Further recommendations will be based on her clinical course. [EKG obtained to assist in diagnosis and management of assessed problem(s)] : EKG obtained to assist in diagnosis and management of assessed problem(s)

## 2023-06-22 ENCOUNTER — NON-APPOINTMENT (OUTPATIENT)
Age: 88
End: 2023-06-22

## 2023-06-22 NOTE — PROCEDURE
[Straight Catheterization] : insertion of a straight catheter [Urinary Tract Infection] : a urinary tract infection [Patient] : the patient [Allergies Reviewed] : Allergies reviewed [___ Fr Straight Tip] : a [unfilled] in [place] straight tip catheter [None] : there were no complications with the catheter insertion [Cloudy] : cloudy [Culture] : culture [Urinalysis] : urinalysis [No Complications] : no complications [Tolerated Well] : the patient tolerated the procedure well [Post procedure instructions and information given] : Post procedure instructions and information were given and reviewed with patient. [FreeTextEntry9] : Urine obtained via straight cath due to vaginal atrophy

## 2023-06-22 NOTE — HISTORY OF PRESENT ILLNESS
[FreeTextEntry1] : Meg, 86 y/o presents to the office for UCA. She has h/o pelvic  prolapse that is supported with  LS #3. \par Today she reports dysuria x 3 days. She denies any fever, chills or back pain.\par  \par E.coli 5/8/23 tx with Nitrofurantoin\par \par \par

## 2023-06-22 NOTE — DISCUSSION/SUMMARY
[FreeTextEntry1] : Acute cystitis:\par -Udip shows trace blood and small leuks\par -Formal UA, CS sent\par -Empirically treated with Bactrim X 3 days\par -Advised pt to call the office if symptoms worsen or persist.\par \par Will f/u 8/28/23 for pessary care

## 2023-06-22 NOTE — PHYSICAL EXAM
[No Acute Distress] : in no acute distress [Well developed] : well developed [Well Nourished] : ~L well nourished [Good Hygeine] : demonstrates good hygeine [Oriented x3] : oriented to person, place, and time [Normal Memory] : ~T memory was ~L unimpaired [Normal Mood/Affect] : mood and affect are normal [Warm and Dry] : was warm and dry to touch [Vulvar Atrophy] : vulvar atrophy [Labia Majora] : were normal [Labia Minora] : were normal [Normal] : was normal [Atrophy] : atrophy [No Bleeding] : there was no active vaginal bleeding [Soft] :  the cervix was soft [Post Void Residual ____ml] : post void residual was [unfilled] ml [Anxiety] : patient is not anxious [Tenderness] : ~T no ~M abdominal tenderness observed [Distended] : not distended [de-identified] : Walks with cane

## 2023-06-26 ENCOUNTER — APPOINTMENT (OUTPATIENT)
Dept: UROGYNECOLOGY | Facility: CLINIC | Age: 88
End: 2023-06-26
Payer: MEDICARE

## 2023-06-26 ENCOUNTER — RESULT CHARGE (OUTPATIENT)
Age: 88
End: 2023-06-26

## 2023-06-26 DIAGNOSIS — N30.00 ACUTE CYSTITIS W/OUT HEMATURIA: ICD-10-CM

## 2023-06-26 LAB
BILIRUB UR QL STRIP: NORMAL
CLARITY UR: CLEAR
COLLECTION METHOD: NORMAL
GLUCOSE UR-MCNC: NORMAL
HCG UR QL: 1 EU/DL
HGB UR QL STRIP.AUTO: NORMAL
KETONES UR-MCNC: NORMAL
LEUKOCYTE ESTERASE UR QL STRIP: NORMAL
NITRITE UR QL STRIP: NORMAL
PH UR STRIP: 7
PROT UR STRIP-MCNC: NORMAL
SP GR UR STRIP: 1.03

## 2023-06-26 PROCEDURE — 99214 OFFICE O/P EST MOD 30 MIN: CPT | Mod: 25

## 2023-06-26 PROCEDURE — 51701 INSERT BLADDER CATHETER: CPT

## 2023-06-26 PROCEDURE — 81003 URINALYSIS AUTO W/O SCOPE: CPT | Mod: QW

## 2023-06-29 RX ORDER — TORSEMIDE 10 MG/1
10 TABLET ORAL
Qty: 90 | Refills: 2 | Status: ACTIVE | COMMUNITY

## 2023-07-02 LAB
APPEARANCE: CLEAR
BACTERIA: ABNORMAL /HPF
BILIRUBIN URINE: NEGATIVE
BLOOD URINE: NEGATIVE
CAST: 2 /LPF
COLOR: YELLOW
EPITHELIAL CELLS: 1 /HPF
GLUCOSE QUALITATIVE U: NEGATIVE MG/DL
KETONES URINE: NEGATIVE MG/DL
LEUKOCYTE ESTERASE URINE: ABNORMAL
MICROSCOPIC-UA: NORMAL
NITRITE URINE: NEGATIVE
PH URINE: 6
PROTEIN URINE: NEGATIVE MG/DL
RED BLOOD CELLS URINE: 0 /HPF
SPECIFIC GRAVITY URINE: 1.01
UROBILINOGEN URINE: 0.2 MG/DL
WHITE BLOOD CELLS URINE: 102 /HPF

## 2023-07-05 ENCOUNTER — TRANSCRIPTION ENCOUNTER (OUTPATIENT)
Age: 88
End: 2023-07-05

## 2023-07-12 NOTE — DISCUSSION/SUMMARY
[FreeTextEntry1] : Acute cystitis:\par -Udip shows trace blood and small leuks\par -Formal UA, CS sent\par -Empirically treated with Macrobid x 5 days\par -Advised pt to call the office if symptoms worsen or persist or development of fever, chills, flank pain\par -She was advised that she would benefit from suppressive therapy \par -f/u in 3 weeks to reassess and start treatment

## 2023-07-12 NOTE — PROCEDURE
[Straight Catheterization] : insertion of a straight catheter [Urinary Tract Infection] : a urinary tract infection [Patient] : the patient [Allergies Reviewed] : Allergies reviewed [___ Fr Straight Tip] : a [unfilled] in [place] straight tip catheter [None] : there were no complications with the catheter insertion [Cloudy] : cloudy [Culture] : culture [Urinalysis] : urinalysis [No Complications] : no complications [Tolerated Well] : the patient tolerated the procedure well [Post procedure instructions and information given] : Post procedure instructions and information were given and reviewed with patient. [FreeTextEntry9] : Urine obtained via straight cath due to vaginal atrophy

## 2023-07-12 NOTE — PHYSICAL EXAM
[No Acute Distress] : in no acute distress [Well developed] : well developed [Well Nourished] : ~L well nourished [Good Hygeine] : demonstrates good hygeine [Oriented x3] : oriented to person, place, and time [Normal Memory] : ~T memory was ~L unimpaired [Normal Mood/Affect] : mood and affect are normal [Warm and Dry] : was warm and dry to touch [Vulvar Atrophy] : vulvar atrophy [Labia Majora] : were normal [Labia Minora] : were normal [Normal] : was normal [Atrophy] : atrophy [No Bleeding] : there was no active vaginal bleeding [Soft] :  the cervix was soft [Post Void Residual ____ml] : post void residual was [unfilled] ml [Anxiety] : patient is not anxious [Tenderness] : ~T no ~M abdominal tenderness observed [Distended] : not distended [de-identified] : Walks with cane

## 2023-07-12 NOTE — HISTORY OF PRESENT ILLNESS
[FreeTextEntry1] : Meg is an 88 y/o female who presents to the office for UCA. She has h/o pelvic prolapse that is supported with  LS #3.  She was last seen 6/5/23 for UTI treated with Keflex.  She reports continued dysuria and urinary frequency.  She denies any fever, chills or back pain.\par  \par \par \par

## 2023-07-19 ENCOUNTER — APPOINTMENT (OUTPATIENT)
Dept: CT IMAGING | Facility: CLINIC | Age: 88
End: 2023-07-19
Payer: MEDICARE

## 2023-07-19 PROCEDURE — 71250 CT THORAX DX C-: CPT

## 2023-07-25 ENCOUNTER — APPOINTMENT (OUTPATIENT)
Dept: THORACIC SURGERY | Facility: CLINIC | Age: 88
End: 2023-07-25
Payer: MEDICARE

## 2023-08-01 ENCOUNTER — APPOINTMENT (OUTPATIENT)
Dept: THORACIC SURGERY | Facility: CLINIC | Age: 88
End: 2023-08-01
Payer: MEDICARE

## 2023-08-01 DIAGNOSIS — R91.8 OTHER NONSPECIFIC ABNORMAL FINDING OF LUNG FIELD: ICD-10-CM

## 2023-08-01 DIAGNOSIS — C34.90 MALIGNANT NEOPLASM OF UNSPECIFIED PART OF UNSPECIFIED BRONCHUS OR LUNG: ICD-10-CM

## 2023-08-01 PROCEDURE — 99443: CPT

## 2023-08-01 NOTE — HISTORY OF PRESENT ILLNESS
[FreeTextEntry1] : Ms. Londono is an 87 year old female w/ PMHx significant for Breast cancer diagnosed in 1999 S/P Bilateral mastectomy and tamoxifen, GERD, Escobar's esophagus.   She is S/P Left VATS, CELESTE wedge resection on 7/6/15 with intraoperative pathology negative for malignancy. Final pathology revealed 2 separate adenocarcinomas. First tumor measured 2.0 x 1.0 cm and was a T1a Nx Mx. Second tumor measured 1.1 x 1.0 cm and was a T1a Nx Mx. On 7/31/2015, patient underwent a redo Left VATS, completion of left upper lobectomy and mediastinal lymph node dissection for 2 separate adenocarcinomas.   CXR on 12/2/19:  - Right lung is clear; left lung volume loss with postsurgical change including chain sutures - No pneumothorax is seen, no pleural effusion  - Subcentimeter right lower lobe and lingular nodules reported on the CT scan are not able to be seen  CT chest on 6/16/2020 - Unchanged three solid nodules measuring less than 0.4 cm are noted within the RML and both lower lobes - No pleural effusions are noted - Small to moderate size hiatal hernia is noted  CT chest on 8/25/21: - Small lymph node in the left axillary region is unchanged when compared to previous exam - Stable, post op changes - Few less than 0.4 cm nodules in RML and both lower are noted, unchanged compared to previous exam  CT Chest on 7/12/2022: - New clustered centrilobular and branching linear densities in LLL - Stable mucoid impaction in the lateral RML.  - Few stable small lung nodules including RLL 4 mm solid nodule - No endobronchial nodule, no pleural effusion, no thoracic adenopathy - Moderate-sized hiatus hernia  CT Chest on 7/19/23:  s/p Left upper lobectomy with stable postoperative changes -  Few solid right lung pulmonary nodules unchanged; reference 0.5 cm RLL nodule abutting the major fissure (image 84, series 301) - Moderate size hiatal hernia. - Bilateral renal hypodense lesions, likely cysts.  Patient presents today for follow up via tele. Overall, she reports to be feeling well. Denies any chest pain, shortness of breath, cough, or hemoptysis.

## 2023-08-01 NOTE — ASSESSMENT
[FreeTextEntry1] : 87 year old female S/P Left VATS, CELESTE wedge resection on 7/6/15 with intraoperative pathology negative for malignancy. Final pathology revealed 2 separate adenocarcinomas. First tumor measured 2.0 x 1.0 cm and was a T1a Nx Mx. Second tumor measured 1.1 x 1.0 cm and was a T1a Nx Mx. On 7/31/2015, patient underwent a redo Left VATS, completion LULobectomy, MLND for 2 separate adenocarcinomas, pathology negative for residual carcinoma.   Here today with follow up imaging   I have independently reviewed the medical records and imaging at the time of this office consultation, and discussed the following interpretations and recommendations with the patient: - CT Chest reviewed with patient, revealing moderate size hiatal hernia. She reports no symptoms and follows up with GI. Otherwise, scan is stable. I recommend she returns to clinic in 1 year with repeat CT Chest without contrast. - Continue follow up with pulm, GI, PCP.   Recommendations reviewed with patient during this office visit, and all questions answered; Patient instructed on the importance of follow up and verbalizes understanding.   I, Dr. BELCHER [facility], personally performed the evaluation and management (E/M) services for this established patient. That E/M includes conducting the examination, assessing all new/exacerbated conditions, and establishing a new plan of care. Today, my ACP, Damon Mandujano NP, was here to observe my evaluation and management services for this new problem/exacerbated condition to be followed going forward.

## 2023-08-01 NOTE — CONSULT LETTER
[FreeTextEntry2] : Dr. Koko Peck (PCP)\par Dr. Waqar Sagastume (Onc)\par Dr. Fredrick Salinas (GI)\par Dr. Morales (Pulm)\par  [FreeTextEntry3] : Thom Aguilar MD, FACS \par Chief, Division of Thoracic Surgery \par Director, Minimally Invasive Thoracic Surgery \par Department of Cardiovascular and Thoracic Surgery \par [facility] \par , Cardiovascular and Thoracic Surgery\par \par \par

## 2023-08-01 NOTE — DATA REVIEWED
[FreeTextEntry1] : Independently reviewed the following imaging:\par - CT Chest on 7/12/2022\par - CT Chest on 7/19/23

## 2023-08-14 ENCOUNTER — APPOINTMENT (OUTPATIENT)
Dept: UROGYNECOLOGY | Facility: CLINIC | Age: 88
End: 2023-08-14
Payer: MEDICARE

## 2023-08-14 VITALS
HEART RATE: 98 BPM | DIASTOLIC BLOOD PRESSURE: 64 MMHG | BODY MASS INDEX: 28.65 KG/M2 | WEIGHT: 144 LBS | SYSTOLIC BLOOD PRESSURE: 100 MMHG | HEIGHT: 59.5 IN

## 2023-08-14 PROCEDURE — 99213 OFFICE O/P EST LOW 20 MIN: CPT

## 2023-08-14 NOTE — PROCEDURE
[Good Fit] : fits well [Refit] : refit is not needed [Erosion] : no evidence of erosion [Erythema] : no erythema [Discharge] : there is vaginal discharge [Infection] : no evidence of infection [Pessary Inserted] : inserted [Pessary Washed] : washed [None] : no bleeding [Medication Review] : Medicaiton Review: Patient verbalizes understanding of risks and benefits [Bowel Management] : Bowel Management: patient verbalizes understanding of daily dietary fiber intake [FreeTextEntry1] : Rohit WYATT #  3 [de-identified] : Erythema on posterior vaginal walls [de-identified] : Scant yellow discharge [FreeTextEntry3] : Continue estrogen use, Replens [FreeTextEntry4] : Advised avoid constipation/straining w/ daily fiber/fluid intake and stool softeners daily PRN [FreeTextEntry8] : Continue daily pericare.

## 2023-08-14 NOTE — DISCUSSION/SUMMARY
[FreeTextEntry1] : Pelvic prolapse: -Continue with Gellhorn LS # 3. -Precautions and instructions were reviewed.  Vaginal atrophy: -Continue Estrace cream and Replens  Follow up in 3 months or sooner if needed.  Advised pt to call office with any questions or concerns, pt verbalized understanding.

## 2023-08-14 NOTE — PHYSICAL EXAM
[No Acute Distress] : in no acute distress [Well developed] : well developed [Well Nourished] : ~L well nourished [Good Hygeine] : demonstrates good hygeine [Oriented x3] : oriented to person, place, and time [Normal Memory] : ~T memory was ~L unimpaired [Normal Mood/Affect] : mood and affect are normal [Anxiety] : patient is not anxious [Tenderness] : ~T no ~M abdominal tenderness observed [Distended] : not distended [Warm and Dry] : was warm and dry to touch [No Lesions] : no lesions were seen on the external genitalia [Vulvar Atrophy] : vulvar atrophy [Labia Majora] : were normal [Labia Minora] : were normal [Normal] : was normal [Atrophy] : atrophy [Erythematous] : erythema [Cystocele] : a cystocele [Discharge] : a  ~M vaginal discharge was present [Scant] : scant [Ananth] : yellow [Thin] : thin [No Bleeding] : there was no active vaginal bleeding [Soft] :  the cervix was soft [de-identified] : Gait steady with walker

## 2023-08-15 RX ORDER — BECLOMETHASONE DIPROPIONATE HFA 80 UG/1
80 AEROSOL, METERED RESPIRATORY (INHALATION)
Qty: 21.2 | Refills: 5 | Status: DISCONTINUED | COMMUNITY
Start: 2018-10-10 | End: 2023-08-15

## 2023-08-30 ENCOUNTER — APPOINTMENT (OUTPATIENT)
Dept: PULMONOLOGY | Facility: CLINIC | Age: 88
End: 2023-08-30
Payer: MEDICARE

## 2023-08-30 VITALS
BODY MASS INDEX: 29.24 KG/M2 | HEART RATE: 80 BPM | HEIGHT: 59.5 IN | SYSTOLIC BLOOD PRESSURE: 108 MMHG | OXYGEN SATURATION: 94 % | DIASTOLIC BLOOD PRESSURE: 69 MMHG | WEIGHT: 147 LBS

## 2023-08-30 DIAGNOSIS — Z23 ENCOUNTER FOR IMMUNIZATION: ICD-10-CM

## 2023-08-30 PROCEDURE — 95012 NITRIC OXIDE EXP GAS DETER: CPT

## 2023-08-30 PROCEDURE — 90662 IIV NO PRSV INCREASED AG IM: CPT

## 2023-08-30 PROCEDURE — 90677 PCV20 VACCINE IM: CPT

## 2023-08-30 PROCEDURE — 94060 EVALUATION OF WHEEZING: CPT

## 2023-08-30 PROCEDURE — G0008: CPT

## 2023-08-30 PROCEDURE — G0009: CPT

## 2023-08-30 PROCEDURE — 99213 OFFICE O/P EST LOW 20 MIN: CPT | Mod: 25

## 2023-08-30 NOTE — HISTORY OF PRESENT ILLNESS
[TextBox_4] : Follow-up for asthma lung cancer chronic cough.  Changed from Qvar to Alvesco with good control of symptoms and cost issues mitigated somewhat. She is requesting influenza vaccination and needs an updated pneumonia shot. She is requesting that vaccinations be administered In the gluteal muscles rather than the arm

## 2023-08-30 NOTE — PROCEDURE
[FreeTextEntry1] : Spirometry normal  Influenza and pneumococcal vaccinations administered in the gluteal muscles

## 2023-09-05 ENCOUNTER — APPOINTMENT (OUTPATIENT)
Dept: INTERNAL MEDICINE | Facility: CLINIC | Age: 88
End: 2023-09-05
Payer: MEDICARE

## 2023-09-05 VITALS
HEIGHT: 59.5 IN | TEMPERATURE: 98 F | DIASTOLIC BLOOD PRESSURE: 68 MMHG | OXYGEN SATURATION: 94 % | SYSTOLIC BLOOD PRESSURE: 120 MMHG | WEIGHT: 146 LBS | BODY MASS INDEX: 29.04 KG/M2 | HEART RATE: 90 BPM | RESPIRATION RATE: 15 BRPM

## 2023-09-05 DIAGNOSIS — N39.0 URINARY TRACT INFECTION, SITE NOT SPECIFIED: ICD-10-CM

## 2023-09-05 DIAGNOSIS — C50.919 MALIGNANT NEOPLASM OF UNSPECIFIED SITE OF UNSPECIFIED FEMALE BREAST: ICD-10-CM

## 2023-09-05 PROCEDURE — 99214 OFFICE O/P EST MOD 30 MIN: CPT

## 2023-09-05 NOTE — ASSESSMENT
[FreeTextEntry1] : UTI- start Ceftin 250mg bid for 7 days. Raynold's- nifedipine er 30mg daily- stable  HTN- continue metoprolol ER 50mg daily.- good control LUNG ca- followed by ct surgeon. COPD- followed by pulmonary.  Osteoporosis - followed by endo. May needs to start Prolia 60mg q 6 months. (cannot tolerate reclast)

## 2023-09-06 LAB
APPEARANCE: CLEAR
BACTERIA: NEGATIVE /HPF
BILIRUBIN URINE: NEGATIVE
BLOOD URINE: NEGATIVE
CAST: 0 /LPF
COLOR: YELLOW
EPITHELIAL CELLS: 2 /HPF
GLUCOSE QUALITATIVE U: NEGATIVE MG/DL
KETONES URINE: NEGATIVE MG/DL
LEUKOCYTE ESTERASE URINE: ABNORMAL
MICROSCOPIC-UA: NORMAL
NITRITE URINE: NEGATIVE
PH URINE: 7
PROTEIN URINE: NEGATIVE MG/DL
RED BLOOD CELLS URINE: 0 /HPF
SPECIFIC GRAVITY URINE: 1.01
UROBILINOGEN URINE: 0.2 MG/DL
WHITE BLOOD CELLS URINE: 43 /HPF

## 2023-09-07 LAB — BACTERIA UR CULT: NORMAL

## 2023-09-09 ENCOUNTER — RX RENEWAL (OUTPATIENT)
Age: 88
End: 2023-09-09

## 2023-09-28 ENCOUNTER — APPOINTMENT (OUTPATIENT)
Dept: CARDIOLOGY | Facility: CLINIC | Age: 88
End: 2023-09-28

## 2023-10-16 ENCOUNTER — APPOINTMENT (OUTPATIENT)
Dept: CARDIOLOGY | Facility: CLINIC | Age: 88
End: 2023-10-16

## 2023-11-15 ENCOUNTER — APPOINTMENT (OUTPATIENT)
Dept: PULMONOLOGY | Facility: CLINIC | Age: 88
End: 2023-11-15
Payer: MEDICARE

## 2023-11-15 VITALS
DIASTOLIC BLOOD PRESSURE: 83 MMHG | BODY MASS INDEX: 28.05 KG/M2 | RESPIRATION RATE: 18 BRPM | HEIGHT: 59.5 IN | HEART RATE: 98 BPM | OXYGEN SATURATION: 97 % | WEIGHT: 141 LBS | SYSTOLIC BLOOD PRESSURE: 130 MMHG

## 2023-11-15 PROCEDURE — 95012 NITRIC OXIDE EXP GAS DETER: CPT

## 2023-11-15 PROCEDURE — 99213 OFFICE O/P EST LOW 20 MIN: CPT | Mod: 25

## 2023-12-04 DIAGNOSIS — Z00.00 ENCOUNTER FOR GENERAL ADULT MEDICAL EXAMINATION W/OUT ABNORMAL FINDINGS: ICD-10-CM

## 2023-12-07 LAB
25(OH)D3 SERPL-MCNC: 40 NG/ML
ALBUMIN SERPL ELPH-MCNC: 4.8 G/DL
ALP BLD-CCNC: 89 U/L
ALT SERPL-CCNC: 14 U/L
ANION GAP SERPL CALC-SCNC: 15 MMOL/L
APPEARANCE: CLEAR
AST SERPL-CCNC: 14 U/L
BACTERIA: NEGATIVE /HPF
BASOPHILS # BLD AUTO: 0.09 K/UL
BASOPHILS NFR BLD AUTO: 0.9 %
BILIRUB SERPL-MCNC: 0.4 MG/DL
BILIRUBIN URINE: NEGATIVE
BLOOD URINE: NEGATIVE
BUN SERPL-MCNC: 12 MG/DL
CALCIUM SERPL-MCNC: 10.5 MG/DL
CAST: 0 /LPF
CHLORIDE SERPL-SCNC: 100 MMOL/L
CHOLEST SERPL-MCNC: 137 MG/DL
CO2 SERPL-SCNC: 26 MMOL/L
COLOR: YELLOW
CREAT SERPL-MCNC: 0.62 MG/DL
EGFR: 86 ML/MIN/1.73M2
EOSINOPHIL # BLD AUTO: 0.24 K/UL
EOSINOPHIL NFR BLD AUTO: 2.4 %
EPITHELIAL CELLS: 2 /HPF
ESTIMATED AVERAGE GLUCOSE: 114 MG/DL
FOLATE SERPL-MCNC: 13.4 NG/ML
GLUCOSE QUALITATIVE U: NEGATIVE MG/DL
GLUCOSE SERPL-MCNC: 105 MG/DL
HBA1C MFR BLD HPLC: 5.6 %
HCT VFR BLD CALC: 33.3 %
HDLC SERPL-MCNC: 62 MG/DL
HGB BLD-MCNC: 10.1 G/DL
IMM GRANULOCYTES NFR BLD AUTO: 0.4 %
KETONES URINE: NEGATIVE MG/DL
LDLC SERPL CALC-MCNC: 54 MG/DL
LEUKOCYTE ESTERASE URINE: NEGATIVE
LYMPHOCYTES # BLD AUTO: 2.63 K/UL
LYMPHOCYTES NFR BLD AUTO: 26.5 %
MAN DIFF?: NORMAL
MCHC RBC-ENTMCNC: 23.5 PG
MCHC RBC-ENTMCNC: 30.3 GM/DL
MCV RBC AUTO: 77.6 FL
MICROSCOPIC-UA: NORMAL
MONOCYTES # BLD AUTO: 1.04 K/UL
MONOCYTES NFR BLD AUTO: 10.5 %
NEUTROPHILS # BLD AUTO: 5.87 K/UL
NEUTROPHILS NFR BLD AUTO: 59.3 %
NITRITE URINE: NEGATIVE
NONHDLC SERPL-MCNC: 75 MG/DL
PH URINE: 7.5
PLATELET # BLD AUTO: 940 K/UL
POTASSIUM SERPL-SCNC: 5.3 MMOL/L
PROT SERPL-MCNC: 6.9 G/DL
PROTEIN URINE: NEGATIVE MG/DL
RBC # BLD: 4.29 M/UL
RBC # FLD: 18.8 %
RED BLOOD CELLS URINE: 1 /HPF
SODIUM SERPL-SCNC: 141 MMOL/L
SPECIFIC GRAVITY URINE: 1.01
TRIGL SERPL-MCNC: 119 MG/DL
TSH SERPL-ACNC: 3.8 UIU/ML
UROBILINOGEN URINE: 0.2 MG/DL
VIT B12 SERPL-MCNC: 263 PG/ML
WBC # FLD AUTO: 9.91 K/UL
WHITE BLOOD CELLS URINE: 0 /HPF

## 2023-12-14 ENCOUNTER — APPOINTMENT (OUTPATIENT)
Dept: INTERNAL MEDICINE | Facility: CLINIC | Age: 88
End: 2023-12-14
Payer: MEDICARE

## 2023-12-14 VITALS
RESPIRATION RATE: 18 BRPM | WEIGHT: 143 LBS | SYSTOLIC BLOOD PRESSURE: 110 MMHG | HEIGHT: 59.5 IN | OXYGEN SATURATION: 96 % | TEMPERATURE: 98 F | BODY MASS INDEX: 28.45 KG/M2 | HEART RATE: 102 BPM | DIASTOLIC BLOOD PRESSURE: 80 MMHG

## 2023-12-14 DIAGNOSIS — E78.5 HYPERLIPIDEMIA, UNSPECIFIED: ICD-10-CM

## 2023-12-14 DIAGNOSIS — D69.3 IMMUNE THROMBOCYTOPENIC PURPURA: ICD-10-CM

## 2023-12-14 DIAGNOSIS — I10 ESSENTIAL (PRIMARY) HYPERTENSION: ICD-10-CM

## 2023-12-14 PROCEDURE — 99214 OFFICE O/P EST MOD 30 MIN: CPT

## 2023-12-14 RX ORDER — NITROFURANTOIN (MONOHYDRATE/MACROCRYSTALS) 25; 75 MG/1; MG/1
100 CAPSULE ORAL
Qty: 10 | Refills: 0 | Status: DISCONTINUED | COMMUNITY
Start: 2023-06-26 | End: 2023-12-14

## 2023-12-14 RX ORDER — CEFUROXIME AXETIL 250 MG/1
250 TABLET ORAL
Qty: 14 | Refills: 0 | Status: DISCONTINUED | COMMUNITY
Start: 2023-09-05 | End: 2023-12-14

## 2023-12-14 RX ORDER — METOPROLOL SUCCINATE 25 MG/1
25 TABLET, EXTENDED RELEASE ORAL DAILY
Qty: 30 | Refills: 5 | Status: DISCONTINUED | COMMUNITY
Start: 2023-06-02 | End: 2023-12-14

## 2023-12-14 RX ORDER — NIFEDIPINE 30 MG/1
30 TABLET, EXTENDED RELEASE ORAL DAILY
Qty: 90 | Refills: 2 | Status: DISCONTINUED | COMMUNITY
Start: 2023-01-04 | End: 2023-12-14

## 2023-12-14 RX ORDER — AMLODIPINE BESYLATE 5 MG/1
5 TABLET ORAL
Refills: 0 | Status: ACTIVE | COMMUNITY

## 2023-12-14 RX ORDER — METOPROLOL SUCCINATE 50 MG/1
50 TABLET, EXTENDED RELEASE ORAL DAILY
Qty: 90 | Refills: 3 | Status: DISCONTINUED | COMMUNITY
End: 2023-12-14

## 2023-12-14 NOTE — ASSESSMENT
[FreeTextEntry1] : HTN- continue amlodipine 5mg daily COPD- Continue Alvesco  2x per day, Singular 10m daily and Spiriva daily. ET- continue  asa 81mg daily GERD- continue pantoprazole 40mg daily.

## 2023-12-14 NOTE — HEALTH RISK ASSESSMENT
[No falls in past year] : Patient reported no falls in the past year [0] : 2) Feeling down, depressed, or hopeless: Not at all (0) [PHQ-2 Negative - No further assessment needed] : PHQ-2 Negative - No further assessment needed [[id number]] : 0

## 2023-12-14 NOTE — HISTORY OF PRESENT ILLNESS
[de-identified] : here today for follow up. History of ET- has follow up with hem Alberto 3, 2024 History of COPD- followed by pulmonary  History of HTN- followed by cardiology. ( dr. Quijano) Overall feels well

## 2023-12-18 ENCOUNTER — APPOINTMENT (OUTPATIENT)
Dept: UROGYNECOLOGY | Facility: CLINIC | Age: 88
End: 2023-12-18

## 2024-01-03 NOTE — REVIEW OF SYSTEMS
"Today starting feeling "off"  so started checking BP throughout day & concerned ab high readings. +dizzy earlier when standing at times. Feels a little uneasy, like  anxious he believes due to heart. Feels "bloating feeling, like pressure or tightness" in upper abd, below breast bone on & off today. Usually takes a gas ex pill & it goes away, helped a little today.     Ranges: Systolic 135-150 /  diastolic 105-110. HR remains 100-115's all day. Stopped  BP med awhile back, but took one today after readings.     Current reading 144/97 . -sob. -palpitations. -dizziness/weakness.     Advised per protocol-ER now for eval. Pt vu.       Reason for Disposition   Pain lasting > 10 minutes and over 35 years old and at least one cardiac risk factor (e.g., diabetes, high cholesterol, hypertension, obesity, smoker or strong family history of heart disease)    Additional Information   Negative: Passed out (i.e., lost consciousness, collapsed and was not responding)   Negative: Shock suspected (e.g., cold/pale/clammy skin, too weak to stand, low BP, rapid pulse)   Negative: Difficult to awaken or acting confused (e.g., disoriented, slurred speech)   Negative: Visible sweat on face or sweat dripping down face   Negative: Unable to walk, or can only walk with assistance (e.g., requires support)   Negative: Received SHOCK from implantable cardiac defibrillator and has persisting symptoms (i.e., palpitations, lightheadedness)   Negative: Dizziness, lightheadedness, or weakness and heart beating very rapidly (e.g., > 140 / minute)   Negative: Dizziness, lightheadedness, or weakness and heart beating very slowly (e.g., < 50 / minute)   Negative: Sounds like a life-threatening emergency to the triager   Negative: Difficulty breathing   Negative: Dizziness, lightheadedness, or weakness   Negative: Heart beating very rapidly (e.g., > 140 / minute) and present now  (Exception: During exercise.)   Negative: Heart beating very slowly " (e.g., < 50 / minute)  (Exception: Athlete and heart rate normal for caller.)   Negative: New or worsened shortness of breath with activity (dyspnea on exertion)   Negative: Patient sounds very sick or weak to the triager   Negative: Wearing a 'Holter monitor' or 'cardiac event monitor'   Negative: Received SHOCK from implantable cardiac defibrillator (and now feels well)   Negative: SEVERE difficulty breathing (e.g., struggling for each breath, speaks in single words)   Negative: Shock suspected (e.g., cold/pale/clammy skin, too weak to stand, low BP, rapid pulse)   Negative: Difficult to awaken or acting confused (e.g., disoriented, slurred speech)   Negative: Passed out (i.e., lost consciousness, collapsed and was not responding)   Negative: Visible sweat on face or sweat is dripping down   Negative: Sounds like a life-threatening emergency to the triager   Negative: SEVERE abdominal pain (e.g., excruciating)   Negative: Pain lasting > 10 minutes and over 50 years old   Negative: Pain lasting > 10 minutes and over 40 years old and associated chest, arm, neck, upper back, or jaw pain    Protocols used: Abdominal Pain - Upper-A-OH, Heart Rate and Heartbeat [id number]     [As Noted in HPI] : as noted in HPI [Negative] : Heme/Lymph

## 2024-01-10 ENCOUNTER — APPOINTMENT (OUTPATIENT)
Dept: PULMONOLOGY | Facility: CLINIC | Age: 89
End: 2024-01-10
Payer: MEDICARE

## 2024-01-10 ENCOUNTER — NON-APPOINTMENT (OUTPATIENT)
Age: 89
End: 2024-01-10

## 2024-01-10 VITALS
DIASTOLIC BLOOD PRESSURE: 82 MMHG | HEART RATE: 104 BPM | OXYGEN SATURATION: 96 % | BODY MASS INDEX: 28.83 KG/M2 | HEIGHT: 59 IN | SYSTOLIC BLOOD PRESSURE: 139 MMHG | WEIGHT: 143 LBS

## 2024-01-10 DIAGNOSIS — J45.909 UNSPECIFIED ASTHMA, UNCOMPLICATED: ICD-10-CM

## 2024-01-10 PROCEDURE — 94010 BREATHING CAPACITY TEST: CPT

## 2024-01-10 PROCEDURE — 99213 OFFICE O/P EST LOW 20 MIN: CPT | Mod: 25

## 2024-01-10 PROCEDURE — 95012 NITRIC OXIDE EXP GAS DETER: CPT

## 2024-01-10 RX ORDER — CICLESONIDE 160 UG/1
160 AEROSOL, METERED RESPIRATORY (INHALATION)
Qty: 3 | Refills: 3 | Status: ACTIVE | COMMUNITY
Start: 2023-06-01 | End: 1900-01-01

## 2024-01-10 NOTE — ASSESSMENT
[FreeTextEntry1] : Stable from asthma perspective.  Minimal asymptomatic decline in FEV1.  Would not change medication at this time.  Renewed Alvesco.

## 2024-01-10 NOTE — HISTORY OF PRESENT ILLNESS
[Never] : never [TextBox_4] : Follow-up for asthma no pulmonary complaint at this time minimal cough not short of breath.  Recently found to be anemic and possibly getting iron infusions.  Camera study scheduled.

## 2024-02-01 ENCOUNTER — RX RENEWAL (OUTPATIENT)
Age: 89
End: 2024-02-01

## 2024-02-01 RX ORDER — FLUTICASONE PROPIONATE 50 UG/1
50 SPRAY, METERED NASAL
Qty: 48 | Refills: 3 | Status: ACTIVE | COMMUNITY
Start: 2018-08-08 | End: 1900-01-01

## 2024-02-26 ENCOUNTER — APPOINTMENT (OUTPATIENT)
Dept: UROGYNECOLOGY | Facility: CLINIC | Age: 89
End: 2024-02-26
Payer: MEDICARE

## 2024-02-26 DIAGNOSIS — N95.2 POSTMENOPAUSAL ATROPHIC VAGINITIS: ICD-10-CM

## 2024-02-26 DIAGNOSIS — N81.9 FEMALE GENITAL PROLAPSE, UNSPECIFIED: ICD-10-CM

## 2024-02-26 PROCEDURE — 99213 OFFICE O/P EST LOW 20 MIN: CPT

## 2024-02-26 NOTE — PHYSICAL EXAM
[No Acute Distress] : in no acute distress [Well developed] : well developed [Well Nourished] : ~L well nourished [Good Hygeine] : demonstrates good hygeine [Oriented x3] : oriented to person, place, and time [Normal Memory] : ~T memory was ~L unimpaired [Normal Mood/Affect] : mood and affect are normal [Anxiety] : patient is not anxious [Tenderness] : ~T no ~M abdominal tenderness observed [Distended] : not distended [Warm and Dry] : was warm and dry to touch [No Lesions] : no lesions were seen on the external genitalia [Vulvar Atrophy] : vulvar atrophy [Labia Majora] : were normal [Labia Minora] : were normal [Normal] : was normal [Atrophy] : atrophy [Erythematous] : erythema [Cystocele] : a cystocele [Discharge] : a  ~M vaginal discharge was present [Scant] : scant [Ananth] : yellow [Thin] : thin [No Bleeding] : there was no active vaginal bleeding [Soft] :  the cervix was soft [de-identified] : Gait steady with walker

## 2024-02-26 NOTE — PROCEDURE
[Good Fit] : fits well [Refit] : refit is not needed [Erosion] : no evidence of erosion [Erythema] : no erythema [Discharge] : there is vaginal discharge [Infection] : no evidence of infection [Pessary Inserted] : inserted [Pessary Washed] : washed [None] : no bleeding [Medication Review] : Medicaiton Review: Patient verbalizes understanding of risks and benefits [Bowel Management] : Bowel Management: patient verbalizes understanding of daily dietary fiber intake [FreeTextEntry1] : Rohit WYATT #  3 [de-identified] : Erythema on posterior vaginal walls [de-identified] : Scant yellow discharge [FreeTextEntry3] : Continue estrogen use, Replens [FreeTextEntry4] : Advised avoid constipation/straining w/ daily fiber/fluid intake and stool softeners daily PRN [FreeTextEntry8] : Continue daily pericare.

## 2024-02-26 NOTE — HISTORY OF PRESENT ILLNESS
[FreeTextEntry1] : Meg, 87 y/o presents to the office for follow up for her pelvic prolapse. Prolapse is being supported with a  LS #3.  PT is doing well with it and happy with support.  She denies any vaginal bleeding, pelvic pain or discomfort. Denies any issues with urination or moving her bowels.  Pt also using Estradiol cream as Rx.

## 2024-02-28 RX ORDER — ESTRADIOL 0.1 MG/G
0.1 CREAM VAGINAL
Qty: 42.5 | Refills: 6 | Status: ACTIVE | COMMUNITY
Start: 2018-04-23 | End: 1900-01-01

## 2024-03-05 ENCOUNTER — APPOINTMENT (OUTPATIENT)
Dept: ORTHOPEDIC SURGERY | Facility: CLINIC | Age: 89
End: 2024-03-05
Payer: MEDICARE

## 2024-03-05 VITALS — WEIGHT: 140 LBS | BODY MASS INDEX: 27.85 KG/M2 | HEIGHT: 59.5 IN

## 2024-03-05 DIAGNOSIS — M19.012 PRIMARY OSTEOARTHRITIS, LEFT SHOULDER: ICD-10-CM

## 2024-03-05 DIAGNOSIS — N81.10 CYSTOCELE, UNSPECIFIED: ICD-10-CM

## 2024-03-05 DIAGNOSIS — Z85.118 PERSONAL HISTORY OF OTHER MALIGNANT NEOPLASM OF BRONCHUS AND LUNG: ICD-10-CM

## 2024-03-05 DIAGNOSIS — Z85.3 PERSONAL HISTORY OF MALIGNANT NEOPLASM OF BREAST: ICD-10-CM

## 2024-03-05 DIAGNOSIS — M25.512 PAIN IN LEFT SHOULDER: ICD-10-CM

## 2024-03-05 PROCEDURE — 73030 X-RAY EXAM OF SHOULDER: CPT | Mod: LT

## 2024-03-05 PROCEDURE — 73562 X-RAY EXAM OF KNEE 3: CPT | Mod: LT

## 2024-03-05 PROCEDURE — 99214 OFFICE O/P EST MOD 30 MIN: CPT | Mod: 25

## 2024-03-05 PROCEDURE — 20610 DRAIN/INJ JOINT/BURSA W/O US: CPT | Mod: LT

## 2024-03-05 RX ORDER — ALBUTEROL 90 MCG
AEROSOL (GRAM) INHALATION
Refills: 0 | Status: ACTIVE | COMMUNITY

## 2024-03-05 NOTE — PLAN
[TextEntry] : The patient was advised of the diagnosis. The natural history of the pathology was explained in full to the patient in layman's terms. All questions were answered. The risks and benefits of surgical and non-surgical treatment alternatives were explained in full to the patient.   Cortisone injection given today left knee and left shoulder GH.  Medication was injected into the above treated area. After verbal consent using sterile preparation and technique. The risks, benefits, and alternatives to cortisone injection were explained in full to the patient. Risks outlined include but are not limited to infection, sepsis, bleeding, scarring, skin discoloration, temporary increase in pain, syncopal episode, failure to resolve symptoms, allergic reaction, symptom recurrence, and elevation of blood sugar in diabetics. Patient understood the risks. All questions were answered. After discussion of options, patient requested an injection. Oral informed consent was obtained and sterile prep was done of the injection site. Sterile technique was utilized for the procedure including the preparation of the solutions used for the injection. Patient tolerated the procedure well. Advised to ice the injection site this evening. Prep with Betadine locally to site. Sterile technique used. Patient tolerated procedure well. Post Procedure Instructions: Patient was advised to call if redness, pain, or fever occur and apply ice for 15 min. out of every hour for the next 12-24 hours as tolerated.

## 2024-03-05 NOTE — PHYSICAL EXAM
[Normal Mood and Affect] : normal mood and affect [Able to Communicate] : able to communicate [Well Developed] : well developed [Well Nourished] : well nourished [NL (140)] : flexion 140 degrees [NL (0)] : extension 0 degrees [5___] : hamstring 5[unfilled]/5 [NL (0-180)] : full passive forward flexion 0-180 degrees [NL (0-90)] : full external rotation 0-90 degrees [AP] : anteroposterior [Lateral] : lateral [[place]] : skyline [Degenerative change] : Degenerative change [de-identified] : thin [Left] : left shoulder [Glenohumeral arthritis] : Glenohumeral arthritis [FreeTextEntry1] : Moderate asymmetric narrowing GH with a large bone spur. [] : no guarding during exam [FreeTextEntry9] : Moderate DJD.

## 2024-03-05 NOTE — HISTORY OF PRESENT ILLNESS
[8] : 8 [Sharp] : sharp [Nothing helps with pain getting better] : Nothing helps with pain getting better [Bending forward] : bending forward [] : This patient has had an injection before: yes [de-identified] : 03/05/24:  Return visit for a 88 year old female here today complaining of recurrent left knee pain that began about three weeks ago.  Has a long history of OA and has been treated in the past with Synvisc injections. Did have cortisone injection lt knee in 8/22.  Also complaining of left upper arm pain that began over a year ago after an infusion for osteoporosis.  Has been living with it.  Is able to raise her arm overhead, but having pain when reaching behind.    PMH: s/p IM rodding rt hip fx in 5/22            s/p RT TKR  7/16/18 returns today w/ recurring lt knee pain x last 6 weeks duration.last injection x 10 months ago.  9/29/17 Returns today c/o recurring rt hip and lt knee pain x last 2 weeks duration. had last injection lt knee x 4 months ago, rt hip x 7 months ago and did well.  5/25/17 She returns today for pain Lt knee for cortisone injection as discussed last visit. She continues to have locking. Had good response to Synvisc 75% better. Her Lt hip pain persists aggravated by damp weather. She is traveling to [place] next week.  4/20/17 Returns today Synvisc #3 lt knee. Overall she feels symptoms have improved. Mechanical symptoms persist with pain.  4/13/17 Returns today still c/o lt knee pain. Here for Synvisc injection #2 lt knee.  4/6/17 Returns today to start Synvisc series #1 left knee. No new complaints. Had temp relief with cortisone inj last visit. [FreeTextEntry1] : left knee [FreeTextEntry5] : previous left knee arthroscopy 10 years ago. pain worsening over the past few weeks. previous CSI one year ago. pain wakes her up at night. previous gel shots 5 years ago.

## 2024-03-05 NOTE — PROCEDURE
[Knee] : knee [Large Joint Injection] : Large joint injection [Glenohumeral Joint] : glenohumeral joint [Left] : of the left [Pain] : pain [Inflammation] : inflammation [X-ray evidence of Osteoarthritis on this or prior visit] : x-ray evidence of Osteoarthritis on this or prior visit [Betadine] : betadine [___ cc    1%] : Lidocaine ~Vcc of 1%  [Ethyl Chloride sprayed topically] : ethyl chloride sprayed topically [] : Patient tolerated procedure well [___ cc    40mg] : Methylprednisolone (Depomedrol) ~Vcc of 40 mg  [Call if redness, pain or fever occur] : call if redness, pain or fever occur [Apply ice for 15min out of every hour for the next 12-24 hours as tolerated] : apply ice for 15 minutes out of every hour for the next 12-24 hours as tolerated [Risks, benefits, alternatives discussed / Verbal consent obtained] : the risks benefits, and alternatives have been discussed, and verbal consent was obtained

## 2024-03-12 NOTE — PROCEDURE
3/12/2024 Mr. Cali returns for f/u of dysphagia.  He has a history of esophageal stenosis requiring dilation.  The last time he was dilated it was 8/2023.  He was dilated with a 51 French savory dilator.  He has done very well. He used levsin as needed and protonix for reflux. He has had two episodes with food slowing since his dilatation. This resovled with additional liquids and he had not taken the levsin prior.  He is planning on going on a cruise to [facility] in May 2024. RACHEL
[FreeTextEntry1] : PFT stable\par NIOX 29

## 2024-04-01 NOTE — ED ADULT TRIAGE NOTE - ESI TRIAGE ACUITY LEVEL, MLM
3
No. JOHN screening performed.  STOP BANG Legend: 0-2 = LOW Risk; 3-4 = INTERMEDIATE Risk; 5-8 = HIGH Risk

## 2024-04-10 ENCOUNTER — APPOINTMENT (OUTPATIENT)
Dept: PULMONOLOGY | Facility: CLINIC | Age: 89
End: 2024-04-10
Payer: MEDICARE

## 2024-04-10 VITALS
WEIGHT: 142 LBS | DIASTOLIC BLOOD PRESSURE: 84 MMHG | HEIGHT: 59.5 IN | HEART RATE: 101 BPM | OXYGEN SATURATION: 98 % | BODY MASS INDEX: 28.25 KG/M2 | SYSTOLIC BLOOD PRESSURE: 136 MMHG

## 2024-04-10 DIAGNOSIS — R05.3 CHRONIC COUGH: ICD-10-CM

## 2024-04-10 DIAGNOSIS — J45.991 COUGH VARIANT ASTHMA: ICD-10-CM

## 2024-04-10 PROCEDURE — 95012 NITRIC OXIDE EXP GAS DETER: CPT

## 2024-04-10 PROCEDURE — 99213 OFFICE O/P EST LOW 20 MIN: CPT | Mod: 25

## 2024-04-10 PROCEDURE — 94010 BREATHING CAPACITY TEST: CPT

## 2024-04-10 NOTE — ASSESSMENT
[FreeTextEntry1] : Pulmonary status essentially stable.  Continue to follow-up with thoracic surgery for lung nodules.  Continue inhalers follow-up with GI regarding anemia

## 2024-04-10 NOTE — HISTORY OF PRESENT ILLNESS
[TextBox_4] : Follow-up for asthma chronic cough.  Having ongoing issues with occult blood loss.  Anemia of varying degrees.  Continues on Alvesco 1 puff twice a day for asthma.

## 2024-04-14 NOTE — ED ADULT NURSE NOTE - NS ED NURSE DC INFO COMPLEXITY
Nephrology Attending Progress Note      SUBJECTIVE/24hr UPDATES:    Comfortable  Worried about drop in EF -> reported now 23% on repeat (up and down?)    OBJECTIVE:  Allergies:   ALLERGIES:  Codeine    Medications:  Current Facility-Administered Medications   Medication Dose Route Frequency Provider Last Rate Last Admin    albuterol inhaler 2 puff  2 puff Inhalation Q6H Resp PRN Mohsin, Sana, DO   2 puff at 04/13/24 1553    guaiFENesin-DM (ROBITUSSIN DM) 100-10 MG/5ML syrup 10 mL  10 mL Oral Q4H PRN Leonor Serrano MD   10 mL at 04/13/24 2112    doxycycline hyclate (VIBRAMYCIN) capsule 100 mg  100 mg Oral 2 times per day Leonor Serrano MD   100 mg at 04/14/24 0956    [Held by provider] clopidogrel (PLAVIX) tablet 75 mg  75 mg Oral Daily Leonor Serrano MD        furosemide (LASIX INJECT) injection 40 mg  40 mg Intravenous BID Mohsin, Sana, DO   40 mg at 04/14/24 0957    aspirin chewable 81 mg  81 mg Oral Daily Mohsin, Sana, DO   81 mg at 04/14/24 0958    carvedilol (COREG) tablet 12.5 mg  12.5 mg Oral 2 times per day Mohsin, Sana, DO   12.5 mg at 04/13/24 2105    famotidine (PEPCID) tablet 20 mg  20 mg Oral Daily Mohsin, Sana, DO   20 mg at 04/14/24 0956    febuxostat (ULORIC) tablet 80 mg  80 mg Oral Daily Mohsin, Sana, DO   80 mg at 04/14/24 0956    hydroxychloroquine (PLAQUENIL) tablet 200 mg  200 mg Oral BID WC Mohsin, Sana, DO   200 mg at 04/14/24 0956    isosorbide mononitrate (IMDUR) ER tablet 30 mg  30 mg Oral Daily Mohsin, Sana, DO   30 mg at 04/14/24 0956    rosuvastatin (CRESTOR) tablet 10 mg  10 mg Oral Daily Mohsin, Sana, DO   10 mg at 04/14/24 0956    fluticasone-umeclidin-vilanterol (TRELEGY ELLIPTA) 100-62.5-25 MCG/ACT inhaler 1 puff  1 puff Inhalation Daily Mohsin, Sana, DO   1 puff at 04/14/24 0959    sodium chloride 0.9 % flush bag 25 mL  25 mL Intravenous PRN Mohsin, Sana, DO        sodium chloride 0.9 % injection 2 mL  2 mL Intracatheter 2 times per day Mohsin, Sana, DO   2 mL at 04/14/24 0958    sodium  chloride (NORMAL SALINE) 0.9 % bolus 500 mL  500 mL Intravenous PRN Mohsin, Sana, DO        acetaminophen (TYLENOL) tablet 650 mg  650 mg Oral Q4H PRN Mohsin, Sana, DO        Or    acetaminophen (TYLENOL) suppository 650 mg  650 mg Rectal Q4H PRN Mohsin, Sana, DO        ondansetron (ZOFRAN ODT) disintegrating tablet 4 mg  4 mg Oral Q12H PRN Mohsin, Sana, DO        Or    ondansetron (ZOFRAN) injection 4 mg  4 mg Intravenous Q12H PRN Mohsin, Sana,         polyethylene glycol (MIRALAX) packet 17 g  17 g Oral Daily PRN Mohsin, Sana, DO        melatonin tablet 3 mg  3 mg Oral Nightly PRN Mohsin, Sana, DO   3 mg at 04/13/24 0002    Magnesium Standard Replacement Protocol   Does not apply See Admin Instructions Mohsin, Sana, DO        calcium carbonate (TUMS) chewable tablet 500 mg  500 mg Oral Q4H PRN Mohsin, Sana, DO        heparin (porcine) injection 5,000 Units  5,000 Units Subcutaneous 3 times per day Mohsin, Sana, DO   5,000 Units at 04/14/24 1352         Physical Examination:  Visit Vitals  /72 (BP Location: LUE - Left upper extremity, Patient Position: Supine)   Pulse (!) 43   Temp 98.6 °F (37 °C) (Oral)   Resp 18   Ht 5' 3\" (1.6 m)   Wt 76.6 kg (168 lb 14 oz)   SpO2 95%   BMI 29.91 kg/m²       I/O last 3 completed shifts:  In: 600 [P.O.:600]  Out: 1100 [Urine:1100]     Intake/Output Summary (Last 24 hours) at 4/14/2024 1445  Last data filed at 4/14/2024 1300  Gross per 24 hour   Intake 480 ml   Output 1750 ml   Net -1270 ml            General appearance:  alert and oriented, NAD  Heart: regular rate and rhythm  Lungs: clear to ausculation bilaterally  Abdomen: soft , nontender, nondistended  Extremities: no edema  Neurologic:  no focal deficits           Labs:   Recent Results (from the past 24 hour(s))   Occult Blood - gFOB (guaiac)    Collection Time: 04/13/24  7:01 PM   Result Value Ref Range    gFOB (guaiac) - Occult Blood Negative Negative   Partial Thromboplastin Time (PTT)    Collection Time:  04/14/24  7:29 AM   Result Value Ref Range    PTT 34 (H) 22 - 32 sec   Basic Metabolic Panel    Collection Time: 04/14/24  7:29 AM   Result Value Ref Range    Fasting Status      Sodium 141 135 - 145 mmol/L    Potassium 4.0 3.4 - 5.1 mmol/L    Chloride 108 97 - 110 mmol/L    Carbon Dioxide 30 21 - 32 mmol/L    Anion Gap 7 7 - 19 mmol/L    Glucose 91 70 - 99 mg/dL    BUN 20 6 - 20 mg/dL    Creatinine 1.73 (H) 0.51 - 0.95 mg/dL    Glomerular Filtration Rate 28 (L) >=60    BUN/Cr 12 7 - 25    Calcium 9.3 8.4 - 10.2 mg/dL   Iron And total Iron Binding Capacity    Collection Time: 04/14/24  7:29 AM   Result Value Ref Range    Iron 41 (L) 50 - 170 mcg/dL    Iron Binding Capacity 198 (L) 250 - 450 mcg/dL    Iron, Percent Saturation 21 15 - 45 %   CBC No Differential    Collection Time: 04/14/24  7:29 AM   Result Value Ref Range    WBC 4.6 4.2 - 11.0 K/mcL    RBC 3.62 (L) 4.00 - 5.20 mil/mcL    HGB 9.7 (L) 12.0 - 15.5 g/dL    HCT 30.8 (L) 36.0 - 46.5 %    MCV 85.1 78.0 - 100.0 fl    MCH 26.8 26.0 - 34.0 pg    MCHC 31.5 (L) 32.0 - 36.5 g/dL     140 - 450 K/mcL    RDW-CV 14.4 11.0 - 15.0 %    RDW-SD 44.8 39.0 - 50.0 fL    NRBC 0 <=0 /100 WBC         No results available in last 24 hours  Lab Results   Component Value Date    HGBA1C 5.8 (H) 05/14/2020    HGBA1C A1C%           eAG mg/dL 05/14/2020    HGBA1C 6.0            126 05/14/2020    HGBA1C 6.5            140 05/14/2020    HGBA1C 7.0            154 05/14/2020    HGBA1C 7.5            169 05/14/2020    HGBA1C 8.0            183 05/14/2020    HGBA1C 8.5            197 05/14/2020    HGBA1C 9.0            212 05/14/2020    HGBA1C 9.5            226 05/14/2020    HGBA1C 10.0           240 05/14/2020    HGBA1C ----DIABETIC SCREENING--- 05/14/2020    HGBA1C NON DIABETIC                 <5.7% 05/14/2020    HGBA1C INCREASED RISK                5.7-6.4% 05/14/2020    HGBA1C DIAGNOSTIC FOR DIABETES      >6.4% 05/14/2020    HGBA1C ----DIABETIC CONTROL--- 05/14/2020     Recent  Labs     04/12/24  1601 04/13/24  0548 04/14/24  0729   WBC 4.9 3.7* 4.6   RBC 4.01 3.58* 3.62*   HGB 10.9* 9.5* 9.7*   HCT 35.1* 30.5* 30.8*    203 229   MCV 87.5 85.2 85.1   MCH 27.2 26.5 26.8   MCHC 31.1* 31.1* 31.5*   NRBCRE 0 0 0      Imaging  Recent Labs     04/12/24  1601 04/12/24  1926 04/13/24  0548 04/14/24  0729   SODIUM 142  --  141 141   POTASSIUM 3.2*  --  3.3* 4.0   CO2 29  --  31 30   ANIONGAP 10  --  6* 7   GLUCOSE 85  --  84 91   BUN 25*  --  24* 20   CREATININE 1.86*  --  1.83* 1.73*   CALCIUM 10.2  --  9.5 9.3   MG  --  2.4 2.3  --    BILIRUBIN 0.5  --   --   --    AST 23  --   --   --    GPT 14  --   --   --    ALKPT 71  --   --   --    GLOB 3.4  --   --   --    AGR 0.9*  --   --   --      Strengths  Recent Labs     04/12/24  1844 04/13/24  0548 04/14/24  0729   INR 1.1  --   --    PTT 26 30 34*            Radiology:  No results found.       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  #Chronic systolic and diastolic CHF, EF 45% (4/2023) ->  55-60%  (10/2023) -> 23% (4/13/2024)  #History of chronic pericarditis, Plaquenil-dependent. (Flare off 2023).  remote window (2020)   #Rheumatoid arthritis with gout  #COPD  #Secondary hyperparathyroidism  #Iron deficiency anemia  #Mild cognitive deficit     Plan:     #Right chest wall pain, likely musc-skeletal  #Overload, mild.  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 (Flare off 2023).  remote window (2020)              -cont plaquenil        #Secondary hyperparathyroidism             -cont calcitriol 0.25mg daily and cholecalc 1000 daily        #Iron deficiency anemia             -screen iron -> adeq 21%        #Rheumatoid arthritis with gout             -cont  [place]      Eris Montero MD  4/14/2024, 2:45 PM      -Over 50min total time on this followup encounter, including face-to-face evaluation, independent interpretation of labs, high MDM assessment and management, coord of care, and charting of this acute and chronically complex patient.   Verbalized Understanding

## 2024-04-22 RX ORDER — MONTELUKAST 10 MG/1
10 TABLET, FILM COATED ORAL
Qty: 90 | Refills: 3 | Status: ACTIVE | COMMUNITY
Start: 2023-02-02 | End: 1900-01-01

## 2024-05-03 ENCOUNTER — RX RENEWAL (OUTPATIENT)
Age: 89
End: 2024-05-03

## 2024-05-12 ENCOUNTER — RX RENEWAL (OUTPATIENT)
Age: 89
End: 2024-05-12

## 2024-05-12 RX ORDER — ATORVASTATIN CALCIUM 10 MG/1
10 TABLET, FILM COATED ORAL
Qty: 90 | Refills: 3 | Status: ACTIVE | COMMUNITY
Start: 2017-06-21 | End: 1900-01-01

## 2024-05-14 RX ORDER — TIOTROPIUM BROMIDE INHALATION SPRAY 3.12 UG/1
2.5 SPRAY, METERED RESPIRATORY (INHALATION)
Qty: 3 | Refills: 4 | Status: ACTIVE | COMMUNITY
Start: 2018-11-27 | End: 1900-01-01

## 2024-05-21 ENCOUNTER — APPOINTMENT (OUTPATIENT)
Dept: ORTHOPEDIC SURGERY | Facility: CLINIC | Age: 89
End: 2024-05-21
Payer: MEDICARE

## 2024-05-21 DIAGNOSIS — M17.12 UNILATERAL PRIMARY OSTEOARTHRITIS, LEFT KNEE: ICD-10-CM

## 2024-05-21 DIAGNOSIS — Z96.651 PRESENCE OF RIGHT ARTIFICIAL KNEE JOINT: ICD-10-CM

## 2024-05-21 PROCEDURE — 20610 DRAIN/INJ JOINT/BURSA W/O US: CPT | Mod: LT

## 2024-05-21 PROCEDURE — 99213 OFFICE O/P EST LOW 20 MIN: CPT | Mod: 25

## 2024-05-21 NOTE — PHYSICAL EXAM
[Normal Mood and Affect] : normal mood and affect [Able to Communicate] : able to communicate [Well Developed] : well developed [Well Nourished] : well nourished [NL (0-180)] : full passive forward flexion 0-180 degrees [NL (0-90)] : full external rotation 0-90 degrees [Glenohumeral arthritis] : Glenohumeral arthritis [NL (140)] : flexion 140 degrees [NL (0)] : extension 0 degrees [5___] : hamstring 5[unfilled]/5 [Left] : left knee [AP] : anteroposterior [Lateral] : lateral [[place]] : skyline [Degenerative change] : Degenerative change [de-identified] : thin [FreeTextEntry1] : Moderate asymmetric narrowing GH with a large bone spur. [] : negative Lachmann [FreeTextEntry9] : Moderate DJD.

## 2024-05-21 NOTE — PROCEDURE
[Large Joint Injection] : Large joint injection [Left] : of the left [Knee] : knee [Pain] : pain [X-ray evidence of Osteoarthritis on this or prior visit] : x-ray evidence of Osteoarthritis on this or prior visit [Betadine] : betadine [Ethyl Chloride sprayed topically] : ethyl chloride sprayed topically [Gel-One (30mg)] : 30mg of Gel-One [] : Patient tolerated procedure well [Call if redness, pain or fever occur] : call if redness, pain or fever occur [Apply ice for 15min out of every hour for the next 12-24 hours as tolerated] : apply ice for 15 minutes out of every hour for the next 12-24 hours as tolerated [Risks, benefits, alternatives discussed / Verbal consent obtained] : the risks benefits, and alternatives have been discussed, and verbal consent was obtained

## 2024-05-21 NOTE — HISTORY OF PRESENT ILLNESS
[8] : 8 [Sharp] : sharp [Nothing helps with pain getting better] : Nothing helps with pain getting better [Bending forward] : bending forward [] : This patient has had an injection before: yes [de-identified] : 05/21/24:  Return visit for a 88 year old female here today complaining of recurrent left knee pain. Now 2 months s/p cortisone injection. Would like to try gel injections.  03/05/24:  Return visit for a 88 year old female here today complaining of recurrent left knee pain that began about three weeks ago.  Has a long history of OA and has been treated in the past with Synvisc injections. Did have cortisone injection lt knee in 8/22.  Also complaining of left upper arm pain that began over a year ago after an infusion for osteoporosis.  Has been living with it.  Is able to raise her arm overhead, but having pain when reaching behind.    PMH: s/p IM rodding rt hip fx in 5/22            s/p RT TKR  7/16/18 returns today w/ recurring lt knee pain x last 6 weeks duration.last injection x 10 months ago.  9/29/17 Returns today c/o recurring rt hip and lt knee pain x last 2 weeks duration. had last injection lt knee x 4 months ago, rt hip x 7 months ago and did well.  5/25/17 She returns today for pain Lt knee for cortisone injection as discussed last visit. She continues to have locking. Had good response to Synvisc 75% better. Her Lt hip pain persists aggravated by damp weather. She is traveling to [place] next week.  4/20/17 Returns today Synvisc #3 lt knee. Overall she feels symptoms have improved. Mechanical symptoms persist with pain.  4/13/17 Returns today still c/o lt knee pain. Here for Synvisc injection #2 lt knee.  4/6/17 Returns today to start Synvisc series #1 left knee. No new complaints. Had temp relief with cortisone inj last visit. [FreeTextEntry1] : left knee [FreeTextEntry5] : previous left knee arthroscopy 10 years ago. pain worsening over the past few weeks. previous CSI one year ago. pain wakes her up at night. previous gel shots 5 years ago.

## 2024-05-21 NOTE — PLAN
[TextEntry] : The natural progression of osteoarthritis was explained to the patient.  We discussed the possible treatment options from conservative to operative.  These included NSAIDs, Glucosamine and Chondroitin sulfate, and physical therapy as well different types of injections.  We also discussed that at some point they may progress to need a TKA.  Information and pamphlets were given.  Will try Gel One injection lt knee.

## 2024-05-31 ENCOUNTER — NON-APPOINTMENT (OUTPATIENT)
Age: 89
End: 2024-05-31

## 2024-05-31 ENCOUNTER — APPOINTMENT (OUTPATIENT)
Dept: OTOLARYNGOLOGY | Facility: CLINIC | Age: 89
End: 2024-05-31
Payer: MEDICARE

## 2024-05-31 VITALS
SYSTOLIC BLOOD PRESSURE: 123 MMHG | HEIGHT: 59 IN | BODY MASS INDEX: 28.43 KG/M2 | DIASTOLIC BLOOD PRESSURE: 81 MMHG | HEART RATE: 97 BPM | WEIGHT: 141 LBS

## 2024-05-31 DIAGNOSIS — H91.10 PRESBYCUSIS, UNSPECIFIED EAR: ICD-10-CM

## 2024-05-31 PROCEDURE — 92557 COMPREHENSIVE HEARING TEST: CPT

## 2024-05-31 PROCEDURE — 92567 TYMPANOMETRY: CPT

## 2024-05-31 PROCEDURE — 99203 OFFICE O/P NEW LOW 30 MIN: CPT

## 2024-06-03 ENCOUNTER — APPOINTMENT (OUTPATIENT)
Dept: UROGYNECOLOGY | Facility: CLINIC | Age: 89
End: 2024-06-03
Payer: MEDICARE

## 2024-06-03 DIAGNOSIS — N81.11 CYSTOCELE, MIDLINE: ICD-10-CM

## 2024-06-03 PROCEDURE — 99213 OFFICE O/P EST LOW 20 MIN: CPT | Mod: 25

## 2024-06-03 PROCEDURE — 51701 INSERT BLADDER CATHETER: CPT

## 2024-06-03 RX ORDER — NITROFURANTOIN (MONOHYDRATE/MACROCRYSTALS) 25; 75 MG/1; MG/1
100 CAPSULE ORAL
Qty: 10 | Refills: 0 | Status: ACTIVE | COMMUNITY
Start: 2024-06-03 | End: 1900-01-01

## 2024-06-05 ENCOUNTER — NON-APPOINTMENT (OUTPATIENT)
Age: 89
End: 2024-06-05

## 2024-06-05 RX ORDER — CEPHALEXIN 500 MG/1
500 CAPSULE ORAL
Qty: 14 | Refills: 0 | Status: ACTIVE | COMMUNITY
Start: 2024-06-05 | End: 1900-01-01

## 2024-06-07 LAB
APPEARANCE: ABNORMAL
BACTERIA: ABNORMAL /HPF
BILIRUBIN URINE: NEGATIVE
BLOOD URINE: NEGATIVE
CAST: 0 /LPF
COLOR: YELLOW
EPITHELIAL CELLS: 0 /HPF
GLUCOSE QUALITATIVE U: NEGATIVE MG/DL
KETONES URINE: NEGATIVE MG/DL
LEUKOCYTE ESTERASE URINE: ABNORMAL
MICROSCOPIC-UA: NORMAL
NITRITE URINE: POSITIVE
PH URINE: 7
PROTEIN URINE: NEGATIVE MG/DL
RED BLOOD CELLS URINE: 1 /HPF
SPECIFIC GRAVITY URINE: 1.01
UROBILINOGEN URINE: 0.2 MG/DL
WHITE BLOOD CELLS URINE: 203 /HPF

## 2024-06-11 NOTE — PHYSICAL EXAM
[No Acute Distress] : in no acute distress [Well developed] : well developed [Well Nourished] : ~L well nourished [Good Hygeine] : demonstrates good hygeine [Oriented x3] : oriented to person, place, and time [Normal Memory] : ~T memory was ~L unimpaired [Normal Mood/Affect] : mood and affect are normal [Warm and Dry] : was warm and dry to touch [No Lesions] : no lesions were seen on the external genitalia [Vulvar Atrophy] : vulvar atrophy [Labia Majora] : were normal [Labia Minora] : were normal [Normal] : was normal [Atrophy] : atrophy [Erythematous] : erythema [Cystocele] : a cystocele [Discharge] : a  ~M vaginal discharge was present [Scant] : scant [Ananth] : yellow [Thin] : thin [No Bleeding] : there was no active vaginal bleeding [Soft] :  the cervix was soft [Anxiety] : patient is not anxious [Tenderness] : ~T no ~M abdominal tenderness observed [Distended] : not distended [de-identified] : Gait steady with walker

## 2024-06-11 NOTE — HISTORY OF PRESENT ILLNESS
[FreeTextEntry1] : Meg, 89 y/o presents to the office for follow up for her pelvic prolapse. Prolapse is being supported with a  LS #3.  PT is doing well with it and happy with support.  She denies any vaginal bleeding, pelvic pain or discomfort. Denies any issues with urination or moving her bowels.  Pt also using Estradiol cream as Rx.

## 2024-06-11 NOTE — DISCUSSION/SUMMARY
[FreeTextEntry1] : R/O UTI -POCT w/ leuks, blood -UA, Cx sent  -Macrobid Rx sent to her pharmacy  Pelvic prolapse: -Continue with Gellhorn LS # 3. -Precautions and instructions were reviewed.  Vaginal atrophy: -Continue Estrace cream and Replens  Follow up in 3 months or sooner if needed.  Advised pt to call office with any questions or concerns, pt verbalized understanding.

## 2024-06-11 NOTE — PROCEDURE
[Straight Catheterization] : insertion of a straight catheter [Urinary Tract Infection] : a urinary tract infection [Good Fit] : fits well [Discharge] : there is vaginal discharge [Pessary Inserted] : inserted [Pessary Washed] : washed [None] : no bleeding [Medication Review] : Medicaiton Review: Patient verbalizes understanding of risks and benefits [Bowel Management] : Bowel Management: patient verbalizes understanding of daily dietary fiber intake [Refit] : refit is not needed [Erosion] : no evidence of erosion [Erythema] : no erythema [Infection] : no evidence of infection [FreeTextEntry1] : Rohit WYATT #  3 [de-identified] : Erythema on posterior vaginal walls [de-identified] : Scant yellow discharge [FreeTextEntry3] : Continue estrogen use, Replens [FreeTextEntry4] : Advised avoid constipation/straining w/ daily fiber/fluid intake and stool softeners daily PRN [FreeTextEntry8] : Continue daily pericare.

## 2024-06-14 ENCOUNTER — APPOINTMENT (OUTPATIENT)
Dept: ORTHOPEDIC SURGERY | Facility: CLINIC | Age: 89
End: 2024-06-14
Payer: MEDICARE

## 2024-06-14 VITALS — BODY MASS INDEX: 28.63 KG/M2 | HEIGHT: 59 IN | WEIGHT: 142 LBS

## 2024-06-14 DIAGNOSIS — M50.322 OTHER CERVICAL DISC DEGENERATION AT C5-C6 LEVEL: ICD-10-CM

## 2024-06-14 DIAGNOSIS — M43.12 SPONDYLOLISTHESIS, CERVICAL REGION: ICD-10-CM

## 2024-06-14 DIAGNOSIS — M54.2 CERVICALGIA: ICD-10-CM

## 2024-06-14 DIAGNOSIS — M47.812 SPONDYLOSIS W/OUT MYELOPATHY OR RADICULOPATHY, CERVICAL REGION: ICD-10-CM

## 2024-06-14 PROCEDURE — 72040 X-RAY EXAM NECK SPINE 2-3 VW: CPT

## 2024-06-14 PROCEDURE — 99214 OFFICE O/P EST MOD 30 MIN: CPT

## 2024-06-14 RX ORDER — LOSARTAN POTASSIUM 25 MG/1
25 TABLET, FILM COATED ORAL
Refills: 0 | Status: ACTIVE | COMMUNITY

## 2024-06-14 RX ORDER — METHYLPREDNISOLONE 4 MG/1
4 TABLET ORAL
Qty: 1 | Refills: 1 | Status: ACTIVE | COMMUNITY
Start: 2024-06-14 | End: 1900-01-01

## 2024-06-14 NOTE — HISTORY OF PRESENT ILLNESS
[Neck] : neck [9] : 9 [7] : 7 [Sharp] : sharp [Shooting] : shooting [Constant] : constant [Meds] : meds [Bending forward] : bending forward [Extending back] : extending back [Retired] : Work status: retired [de-identified] : 6/14/24:  Return visit for this 88 year old female woke up c/o spontaneous onset of neck pain x last 10 days duration. No hx of trauma. Radiates to back of her head and lt scapula. Pain is a "7". Tried Tylenol 1300 mg prn w/some relief.  PMH: Noprior neck issues.  No diabetes. [] : no [FreeTextEntry7] : towards back [de-identified] : none

## 2024-06-14 NOTE — PLAN
[TextEntry] : We discussed at length with the patient the options for treatment.  We discussed conservative care including physical therapy, acupuncture, massage therapy, and chiropractic care.  We discussed injection therapy and even surgical intervention should the patient fail conservative care.  We discussed risks, benefits, complications, alternatives, outcomes, expectations.  All questions answered.   Defers PT.  Rest.  Heat.  Medrol dose pack was prescribed. Risks and benefits were explained including but not limited to the possibilities of gastritis, indigestion, and other GI side effects. It was also explained that in patients with a history of diabetes mellitus, it may temporarily elevate their blood sugars which should be monitored at home. A refill was also prescribed to take the subsequent week if needed.

## 2024-06-14 NOTE — PHYSICAL EXAM
[Normal Mood and Affect] : normal mood and affect [Able to Communicate] : able to communicate [Well Developed] : well developed [Well Nourished] : well nourished [NL (45)] : forward flexion 45 degrees [Extension] : extension [Rotation to left] : rotation to left [Rotation to right] : rotation to right [] : no paracervical tenderness [Facet arthropathy] : Facet arthropathy [Disc space narrowing] : Disc space narrowing [FreeTextEntry9] : Mild pain with motion. [FreeTextEntry1] : DDD C5-6.  DFD.  Spondylolisthesis C4 on C5. [de-identified] : extension 30 degrees [de-identified] : left lateral flexion 20 degrees [de-identified] : left lateral rotation 60 degrees [de-identified] : right lateral flexion 20 degrees [TWNoteComboBox6] : right lateral rotation 45 degrees

## 2024-07-08 ENCOUNTER — APPOINTMENT (OUTPATIENT)
Dept: CT IMAGING | Facility: CLINIC | Age: 89
End: 2024-07-08
Payer: MEDICARE

## 2024-07-08 PROCEDURE — 71250 CT THORAX DX C-: CPT

## 2024-07-10 ENCOUNTER — APPOINTMENT (OUTPATIENT)
Dept: PULMONOLOGY | Facility: CLINIC | Age: 89
End: 2024-07-10
Payer: MEDICARE

## 2024-07-10 VITALS
OXYGEN SATURATION: 96 % | SYSTOLIC BLOOD PRESSURE: 134 MMHG | HEART RATE: 101 BPM | HEIGHT: 59 IN | WEIGHT: 140 LBS | DIASTOLIC BLOOD PRESSURE: 87 MMHG | BODY MASS INDEX: 28.22 KG/M2

## 2024-07-10 DIAGNOSIS — J45.909 UNSPECIFIED ASTHMA, UNCOMPLICATED: ICD-10-CM

## 2024-07-10 DIAGNOSIS — R91.8 OTHER NONSPECIFIC ABNORMAL FINDING OF LUNG FIELD: ICD-10-CM

## 2024-07-10 PROCEDURE — 99213 OFFICE O/P EST LOW 20 MIN: CPT | Mod: 25

## 2024-07-10 PROCEDURE — 94010 BREATHING CAPACITY TEST: CPT

## 2024-07-18 ENCOUNTER — RESULT REVIEW (OUTPATIENT)
Age: 89
End: 2024-07-18

## 2024-07-18 ENCOUNTER — APPOINTMENT (OUTPATIENT)
Dept: MRI IMAGING | Facility: CLINIC | Age: 89
End: 2024-07-18

## 2024-07-18 PROCEDURE — 74181 MRI ABDOMEN W/O CONTRAST: CPT

## 2024-08-05 ENCOUNTER — APPOINTMENT (OUTPATIENT)
Dept: UROGYNECOLOGY | Facility: CLINIC | Age: 89
End: 2024-08-05

## 2024-08-05 PROBLEM — R10.2 VAGINAL PAIN: Status: ACTIVE | Noted: 2024-08-05

## 2024-08-05 PROCEDURE — G2211 COMPLEX E/M VISIT ADD ON: CPT

## 2024-08-05 PROCEDURE — 99214 OFFICE O/P EST MOD 30 MIN: CPT

## 2024-08-05 NOTE — PROCEDURE
[Straight Catheterization] : insertion of a straight catheter [Urinary Tract Infection] : a urinary tract infection [Good Fit] : fits well [Discharge] : there is vaginal discharge [Pessary Inserted] : inserted [Pessary Washed] : washed [None] : no bleeding [Medication Review] : Medicaiton Review: Patient verbalizes understanding of risks and benefits [Bowel Management] : Bowel Management: patient verbalizes understanding of daily dietary fiber intake [Refit] : refit is not needed [Erosion] : no evidence of erosion [Erythema] : no erythema [Infection] : no evidence of infection [FreeTextEntry1] : Rohit WYATT #  3 [de-identified] : Scant yellow discharge affirm swab [FreeTextEntry3] : Continue estrogen use, Replens [FreeTextEntry4] : Advised avoid constipation/straining w/ daily fiber/fluid intake and stool softeners daily PRN [FreeTextEntry8] : Continue daily pericare.

## 2024-08-05 NOTE — PHYSICAL EXAM
[No Acute Distress] : in no acute distress [Well developed] : well developed [Well Nourished] : ~L well nourished [Good Hygeine] : demonstrates good hygeine [Oriented x3] : oriented to person, place, and time [Normal Memory] : ~T memory was ~L unimpaired [Normal Mood/Affect] : mood and affect are normal [Warm and Dry] : was warm and dry to touch [No Lesions] : no lesions were seen on the external genitalia [Vulvar Atrophy] : vulvar atrophy [Labia Majora] : were normal [Labia Minora] : were normal [Normal] : was normal [Atrophy] : atrophy [Erythematous] : erythema [Cystocele] : a cystocele [Discharge] : a  ~M vaginal discharge was present [Scant] : scant [Ananth] : yellow [Thin] : thin [No Bleeding] : there was no active vaginal bleeding [Anxiety] : patient is not anxious [Tenderness] : ~T no ~M abdominal tenderness observed [Distended] : not distended [de-identified] : Gait steady with walker

## 2024-08-05 NOTE — DISCUSSION/SUMMARY
[FreeTextEntry1] : Pelvic prolapse: -Continue with Gellhorn LS # 3. -Precautions and instructions were reviewed.  Vaginal pain -F/u Affirm swab  -Advised to contact the office if vaginal bleed  - She was advised that pessary would need to be removed if treatment is warranted   Vaginal atrophy: -Continue Estrace cream and Replens  Follow up in 3 months or sooner if needed.  Advised pt to call office with any questions or concerns, pt verbalized understanding.

## 2024-08-05 NOTE — HISTORY OF PRESENT ILLNESS
[FreeTextEntry1] : Meg, 89 y/o presents to the office for follow up for her pelvic prolapse. Prolapse is being supported with a  LS #3.  PT is doing well with it and happy with support.  She denies any vaginal bleeding.  She reports one week history of vaginal pain and burning. Denies any issues with urination or moving her bowels.  Pt also using Estradiol cream as Rx.

## 2024-10-07 ENCOUNTER — RX RENEWAL (OUTPATIENT)
Age: 89
End: 2024-10-07

## 2024-11-19 ENCOUNTER — APPOINTMENT (OUTPATIENT)
Dept: ORTHOPEDIC SURGERY | Facility: CLINIC | Age: 89
End: 2024-11-19

## 2024-12-02 ENCOUNTER — APPOINTMENT (OUTPATIENT)
Dept: UROGYNECOLOGY | Facility: CLINIC | Age: 88
End: 2024-12-02

## 2024-12-16 ENCOUNTER — APPOINTMENT (OUTPATIENT)
Dept: UROGYNECOLOGY | Facility: CLINIC | Age: 88
End: 2024-12-16
Payer: MEDICARE

## 2024-12-16 DIAGNOSIS — N81.9 FEMALE GENITAL PROLAPSE, UNSPECIFIED: ICD-10-CM

## 2024-12-16 LAB
BILIRUB UR QL STRIP: NORMAL
CLARITY UR: NORMAL
COLLECTION METHOD: NORMAL
GLUCOSE UR-MCNC: NORMAL
HCG UR QL: 0.2 EU/DL
HGB UR QL STRIP.AUTO: NORMAL
KETONES UR-MCNC: NORMAL
LEUKOCYTE ESTERASE UR QL STRIP: NORMAL
NITRITE UR QL STRIP: NORMAL
PH UR STRIP: 7
PROT UR STRIP-MCNC: 30
SP GR UR STRIP: 1.02

## 2024-12-16 PROCEDURE — G2211 COMPLEX E/M VISIT ADD ON: CPT

## 2024-12-16 PROCEDURE — 81003 URINALYSIS AUTO W/O SCOPE: CPT | Mod: QW

## 2024-12-16 PROCEDURE — 99213 OFFICE O/P EST LOW 20 MIN: CPT

## 2024-12-19 LAB
APPEARANCE: CLEAR
BACTERIA: ABNORMAL /HPF
BILIRUBIN URINE: NEGATIVE
BLOOD URINE: ABNORMAL
CAST: 1 /LPF
COLOR: YELLOW
EPITHELIAL CELLS: 0 /HPF
GLUCOSE QUALITATIVE U: NEGATIVE MG/DL
KETONES URINE: NEGATIVE MG/DL
LEUKOCYTE ESTERASE URINE: ABNORMAL
MICROSCOPIC-UA: NORMAL
NITRITE URINE: POSITIVE
PH URINE: 7
PROTEIN URINE: NEGATIVE MG/DL
RED BLOOD CELLS URINE: 1 /HPF
REVIEW: NORMAL
SPECIFIC GRAVITY URINE: 1.01
UROBILINOGEN URINE: 0.2 MG/DL
WHITE BLOOD CELLS URINE: 411 /HPF

## 2024-12-19 RX ORDER — SULFAMETHOXAZOLE AND TRIMETHOPRIM 800; 160 MG/1; MG/1
800-160 TABLET ORAL TWICE DAILY
Qty: 6 | Refills: 0 | Status: ACTIVE | COMMUNITY
Start: 2024-12-19 | End: 1900-01-01

## 2024-12-31 NOTE — REVIEW OF SYSTEMS
Patient notified and instructed on stress testing and ok for surgery.     [Nasal Discharge] : nasal discharge [Cough] : cough [Negative] : Musculoskeletal

## 2025-01-15 ENCOUNTER — APPOINTMENT (OUTPATIENT)
Dept: PULMONOLOGY | Facility: CLINIC | Age: 89
End: 2025-01-15
Payer: MEDICARE

## 2025-01-15 VITALS
WEIGHT: 140 LBS | HEIGHT: 59 IN | BODY MASS INDEX: 28.22 KG/M2 | DIASTOLIC BLOOD PRESSURE: 94 MMHG | OXYGEN SATURATION: 95 % | SYSTOLIC BLOOD PRESSURE: 163 MMHG | HEART RATE: 101 BPM

## 2025-01-15 DIAGNOSIS — J45.909 UNSPECIFIED ASTHMA, UNCOMPLICATED: ICD-10-CM

## 2025-01-15 PROCEDURE — 95012 NITRIC OXIDE EXP GAS DETER: CPT

## 2025-01-15 PROCEDURE — 94010 BREATHING CAPACITY TEST: CPT

## 2025-01-15 PROCEDURE — 99213 OFFICE O/P EST LOW 20 MIN: CPT | Mod: 25

## 2025-02-07 ENCOUNTER — APPOINTMENT (OUTPATIENT)
Dept: MRI IMAGING | Facility: CLINIC | Age: 89
End: 2025-02-07
Payer: MEDICARE

## 2025-02-07 ENCOUNTER — APPOINTMENT (OUTPATIENT)
Dept: ORTHOPEDIC SURGERY | Facility: CLINIC | Age: 89
End: 2025-02-07
Payer: MEDICARE

## 2025-02-07 VITALS — BODY MASS INDEX: 28.22 KG/M2 | WEIGHT: 140 LBS | HEIGHT: 59 IN

## 2025-02-07 DIAGNOSIS — M54.50 LOW BACK PAIN, UNSPECIFIED: ICD-10-CM

## 2025-02-07 DIAGNOSIS — M47.816 SPONDYLOSIS W/OUT MYELOPATHY OR RADICULOPATHY, LUMBAR REGION: ICD-10-CM

## 2025-02-07 PROCEDURE — 99214 OFFICE O/P EST MOD 30 MIN: CPT

## 2025-02-07 PROCEDURE — 72148 MRI LUMBAR SPINE W/O DYE: CPT

## 2025-02-07 PROCEDURE — 72100 X-RAY EXAM L-S SPINE 2/3 VWS: CPT

## 2025-02-07 PROCEDURE — 72170 X-RAY EXAM OF PELVIS: CPT

## 2025-03-10 ENCOUNTER — APPOINTMENT (OUTPATIENT)
Dept: UROGYNECOLOGY | Facility: CLINIC | Age: 89
End: 2025-03-10

## 2025-04-11 ENCOUNTER — RX RENEWAL (OUTPATIENT)
Age: 89
End: 2025-04-11

## 2025-05-19 NOTE — ED ADULT TRIAGE NOTE - ACCOMPANIED BY
Rx Refill Note  Requested Prescriptions     Pending Prescriptions Disp Refills    nortriptyline (PAMELOR) 25 MG capsule [Pharmacy Med Name: NORTRIPTYLINE 25MG CAPSULES] 60 capsule 1     Sig: TAKE 2 CAPSULES BY MOUTH EVERY NIGHT      Last filled:3/20/25 1 ref  Last office visit with prescribing clinician: 2/26/2025      Next office visit with prescribing clinician: 5/27/2025     Ananya Jaimes MA  05/19/25, 07:38 EDT    Sending to pharmacy  
Immediate family member

## 2025-06-09 ENCOUNTER — APPOINTMENT (OUTPATIENT)
Dept: UROGYNECOLOGY | Facility: CLINIC | Age: 89
End: 2025-06-09

## 2025-06-23 ENCOUNTER — RX RENEWAL (OUTPATIENT)
Age: 89
End: 2025-06-23

## 2025-07-08 ENCOUNTER — APPOINTMENT (OUTPATIENT)
Dept: ORTHOPEDIC SURGERY | Facility: CLINIC | Age: 89
End: 2025-07-08
Payer: MEDICARE

## 2025-07-08 VITALS — HEIGHT: 59 IN | BODY MASS INDEX: 28.22 KG/M2 | WEIGHT: 140 LBS

## 2025-07-08 PROBLEM — S36.202A: Status: RESOLVED | Noted: 2025-07-08 | Resolved: 2025-07-08

## 2025-07-08 PROCEDURE — 99213 OFFICE O/P EST LOW 20 MIN: CPT | Mod: 25

## 2025-07-08 PROCEDURE — 73030 X-RAY EXAM OF SHOULDER: CPT | Mod: LT

## 2025-07-08 PROCEDURE — 20610 DRAIN/INJ JOINT/BURSA W/O US: CPT | Mod: LT

## 2025-07-08 RX ORDER — HYDROXYUREA 500 MG/1
CAPSULE ORAL
Refills: 0 | Status: ACTIVE | COMMUNITY

## 2025-07-15 ENCOUNTER — NON-APPOINTMENT (OUTPATIENT)
Age: 89
End: 2025-07-15

## 2025-07-16 ENCOUNTER — APPOINTMENT (OUTPATIENT)
Dept: PULMONOLOGY | Facility: CLINIC | Age: 89
End: 2025-07-16
Payer: MEDICARE

## 2025-07-16 VITALS
HEART RATE: 94 BPM | WEIGHT: 140 LBS | SYSTOLIC BLOOD PRESSURE: 138 MMHG | DIASTOLIC BLOOD PRESSURE: 83 MMHG | BODY MASS INDEX: 28.22 KG/M2 | HEIGHT: 59 IN | OXYGEN SATURATION: 95 %

## 2025-07-16 PROCEDURE — 99213 OFFICE O/P EST LOW 20 MIN: CPT

## 2025-07-21 ENCOUNTER — APPOINTMENT (OUTPATIENT)
Dept: UROGYNECOLOGY | Facility: CLINIC | Age: 89
End: 2025-07-21

## 2025-08-12 ENCOUNTER — APPOINTMENT (OUTPATIENT)
Dept: CT IMAGING | Facility: CLINIC | Age: 89
End: 2025-08-12
Payer: MEDICARE

## 2025-08-12 PROCEDURE — 71250 CT THORAX DX C-: CPT

## 2025-08-18 ENCOUNTER — TRANSCRIPTION ENCOUNTER (OUTPATIENT)
Age: 89
End: 2025-08-18

## 2025-08-25 ENCOUNTER — APPOINTMENT (OUTPATIENT)
Dept: UROGYNECOLOGY | Facility: CLINIC | Age: 89
End: 2025-08-25
Payer: MEDICARE

## 2025-08-25 DIAGNOSIS — N81.11 CYSTOCELE, MIDLINE: ICD-10-CM

## 2025-08-25 PROCEDURE — G2211 COMPLEX E/M VISIT ADD ON: CPT

## 2025-08-25 PROCEDURE — 99213 OFFICE O/P EST LOW 20 MIN: CPT

## 2025-08-25 PROCEDURE — 99459 PELVIC EXAMINATION: CPT

## 2025-09-11 ENCOUNTER — APPOINTMENT (OUTPATIENT)
Dept: MRI IMAGING | Facility: CLINIC | Age: 89
End: 2025-09-11
Payer: MEDICARE

## 2025-09-11 PROCEDURE — 74181 MRI ABDOMEN W/O CONTRAST: CPT

## 2025-09-19 ENCOUNTER — RX RENEWAL (OUTPATIENT)
Age: 89
End: 2025-09-19

## (undated) DEVICE — SUT POLYSORB 1 36" GS-11 UNDYED

## (undated) DEVICE — PACK HIP FRACTURE

## (undated) DEVICE — SOL IRR POUR H2O 1000ML

## (undated) DEVICE — DRSG 4X4

## (undated) DEVICE — GLV 7.5 PROTEXIS

## (undated) DEVICE — STAPLER SKIN VISI-STAT 35 WIDE

## (undated) DEVICE — DRILL BIT SYNTHES ORTHO CALIBRATED 65MM 4X260MM

## (undated) DEVICE — BLANKET WARMER UPPER ADULT

## (undated) DEVICE — SOL IRR POUR NS 0.9% 1000ML

## (undated) DEVICE — DRSG COBAN 6"

## (undated) DEVICE — DRSG WEBRIL 6"

## (undated) DEVICE — SUT POLYSORB 2-0 30" GS-11 UNDYED

## (undated) DEVICE — GLV 8 PROTEXIS

## (undated) DEVICE — SYM-STRYKER SYSTEM 7: Type: DURABLE MEDICAL EQUIPMENT

## (undated) DEVICE — WRAP COMPRESSION CALF MED

## (undated) DEVICE — DRSG COMBINE 5X9"